# Patient Record
Sex: FEMALE | Race: WHITE | NOT HISPANIC OR LATINO | Employment: OTHER | URBAN - METROPOLITAN AREA
[De-identification: names, ages, dates, MRNs, and addresses within clinical notes are randomized per-mention and may not be internally consistent; named-entity substitution may affect disease eponyms.]

---

## 2017-06-13 ENCOUNTER — HOSPITAL ENCOUNTER (OUTPATIENT)
Dept: URGENT CARE | Facility: CLINIC | Age: 52
Discharge: HOME | End: 2017-06-13
Attending: PREVENTIVE MEDICINE | Admitting: PREVENTIVE MEDICINE

## 2017-06-13 ENCOUNTER — HOSPITAL ENCOUNTER (OUTPATIENT)
Dept: FAMILY MEDICINE CLINIC | Facility: CLINIC | Age: 52
Setting detail: SPECIMEN
Discharge: HOME | End: 2017-06-13
Attending: PREVENTIVE MEDICINE | Admitting: PREVENTIVE MEDICINE

## 2017-06-13 LAB
CASTS URNS QL MICRO: NORMAL /[LPF]
CONV BACTERIA IN URINE MICRO: NEGATIVE
CONV HYALINE CASTS IN URINE MICRO: 1 /[LPF] (ref 0–5)
CONV SMALL ROUND CELLS: NORMAL /[HPF]
RBC #/AREA URNS HPF: 1 /[HPF] (ref 0–3)
SPERM URNS QL MICRO: NORMAL /[HPF]
SQUAMOUS SPT QL MICRO: NORMAL /[HPF] (ref 0–5)
UNIDENT CRYS URNS QL MICRO: NORMAL /[HPF]
WBC #/AREA URNS HPF: 1 /[HPF] (ref 0–5)
YEAST SPEC QL WET PREP: NORMAL /[HPF]

## 2017-06-21 ENCOUNTER — HOSPITAL ENCOUNTER (OUTPATIENT)
Dept: CARDIOLOGY | Facility: HOSPITAL | Age: 52
Discharge: HOME | End: 2017-06-21
Attending: PREVENTIVE MEDICINE | Admitting: PREVENTIVE MEDICINE

## 2018-05-15 ENCOUNTER — HOSPITAL ENCOUNTER (OUTPATIENT)
Dept: FAMILY MEDICINE CLINIC | Facility: CLINIC | Age: 53
Setting detail: SPECIMEN
Discharge: HOME | End: 2018-05-15
Attending: PREVENTIVE MEDICINE | Admitting: PREVENTIVE MEDICINE

## 2018-05-15 LAB
ALBUMIN SERPL-MCNC: 4.4 G/DL (ref 3.5–4.8)
ALBUMIN/GLOB SERPL: 1.6 {RATIO} (ref 1–1.7)
ALP SERPL-CCNC: 77 IU/L (ref 32–91)
ALT SERPL-CCNC: 18 IU/L (ref 14–54)
ANION GAP SERPL CALC-SCNC: 13.2 MMOL/L (ref 10–20)
AST SERPL-CCNC: 21 IU/L (ref 15–41)
BASOPHILS # BLD AUTO: 0.1 10*3/UL (ref 0–0.2)
BASOPHILS NFR BLD AUTO: 1 % (ref 0–2)
BILIRUB SERPL-MCNC: 0.8 MG/DL (ref 0.3–1.2)
BUN SERPL-MCNC: 10 MG/DL (ref 8–20)
BUN/CREAT SERPL: 9.1 (ref 5.4–26.2)
CALCIUM SERPL-MCNC: 9.6 MG/DL (ref 8.9–10.3)
CHLORIDE SERPL-SCNC: 93 MMOL/L (ref 101–111)
CHOLEST SERPL-MCNC: 197 MG/DL
CHOLEST/HDLC SERPL: 2.5 {RATIO}
CONV CO2: 26 MMOL/L (ref 22–32)
CONV LDL CHOLESTEROL DIRECT: 108 MG/DL (ref 0–100)
CONV TOTAL PROTEIN: 7.1 G/DL (ref 6.1–7.9)
CREAT UR-MCNC: 1.1 MG/DL (ref 0.4–1)
DIFFERENTIAL METHOD BLD: (no result)
EOSINOPHIL # BLD AUTO: 0.2 10*3/UL (ref 0–0.3)
EOSINOPHIL # BLD AUTO: 2 % (ref 0–3)
ERYTHROCYTE [DISTWIDTH] IN BLOOD BY AUTOMATED COUNT: 12.8 % (ref 11.5–14.5)
GLOBULIN UR ELPH-MCNC: 2.7 G/DL (ref 2.5–3.8)
GLUCOSE SERPL-MCNC: 86 MG/DL (ref 65–99)
HCT VFR BLD AUTO: 32.4 % (ref 35–49)
HDLC SERPL-MCNC: 80 MG/DL
HGB BLD-MCNC: 10.9 G/DL (ref 12–15)
LDLC/HDLC SERPL: 1.3 {RATIO}
LIPID INTERPRETATION: ABNORMAL
LYMPHOCYTES # BLD AUTO: 2.7 10*3/UL (ref 0.8–4.8)
LYMPHOCYTES NFR BLD AUTO: 35 % (ref 18–42)
MCH RBC QN AUTO: 29.9 PG (ref 26–32)
MCHC RBC AUTO-ENTMCNC: 33.7 G/DL (ref 32–36)
MCV RBC AUTO: 88.6 FL (ref 80–94)
MONOCYTES # BLD AUTO: 0.5 10*3/UL (ref 0.1–1.3)
MONOCYTES NFR BLD AUTO: 7 % (ref 2–11)
NEUTROPHILS # BLD AUTO: 4.2 10*3/UL (ref 2.3–8.6)
NEUTROPHILS NFR BLD AUTO: 55 % (ref 50–75)
NRBC BLD AUTO-RTO: 0 /100{WBCS}
NRBC/RBC NFR BLD MANUAL: 0 10*3/UL
PLATELET # BLD AUTO: 261 10*3/UL (ref 150–450)
PMV BLD AUTO: 9.7 FL (ref 7.4–10.4)
POTASSIUM SERPL-SCNC: 4.2 MMOL/L (ref 3.6–5.1)
RBC # BLD AUTO: 3.66 10*6/UL (ref 4–5.4)
SODIUM SERPL-SCNC: 128 MMOL/L (ref 136–144)
TRIGL SERPL-MCNC: 57 MG/DL
VLDLC SERPL CALC-MCNC: 9 MG/DL
WBC # BLD AUTO: 7.6 10*3/UL (ref 4.5–11.5)

## 2018-07-10 ENCOUNTER — HOSPITAL ENCOUNTER (OUTPATIENT)
Dept: MRI IMAGING | Facility: HOSPITAL | Age: 53
Discharge: HOME | End: 2018-07-10
Attending: PAIN MEDICINE | Admitting: PAIN MEDICINE

## 2018-08-16 ENCOUNTER — HOSPITAL ENCOUNTER (OUTPATIENT)
Dept: FAMILY MEDICINE CLINIC | Facility: CLINIC | Age: 53
Setting detail: SPECIMEN
Discharge: HOME | End: 2018-08-16
Attending: PREVENTIVE MEDICINE | Admitting: PREVENTIVE MEDICINE

## 2018-08-16 LAB — CREAT UR-MCNC: 1.1 MG/DL (ref 0.4–1)

## 2018-08-21 ENCOUNTER — HOSPITAL ENCOUNTER (OUTPATIENT)
Dept: FAMILY MEDICINE CLINIC | Facility: CLINIC | Age: 53
Setting detail: SPECIMEN
Discharge: HOME | End: 2018-08-21
Attending: PREVENTIVE MEDICINE | Admitting: PREVENTIVE MEDICINE

## 2018-08-21 LAB
ALBUMIN SERPL-MCNC: 4.5 G/DL (ref 3.5–4.8)
ALBUMIN/GLOB SERPL: 1.5 {RATIO} (ref 1–1.7)
ALP SERPL-CCNC: 71 IU/L (ref 32–91)
ALT SERPL-CCNC: 19 IU/L (ref 14–54)
ANION GAP SERPL CALC-SCNC: 9.6 MMOL/L (ref 10–20)
AST SERPL-CCNC: 21 IU/L (ref 15–41)
BASOPHILS # BLD AUTO: 0.1 10*3/UL (ref 0–0.2)
BASOPHILS NFR BLD AUTO: 1 % (ref 0–2)
BILIRUB SERPL-MCNC: 0.7 MG/DL (ref 0.3–1.2)
BUN SERPL-MCNC: 14 MG/DL (ref 8–20)
BUN/CREAT SERPL: 14 (ref 5.4–26.2)
CALCIUM SERPL-MCNC: 9.6 MG/DL (ref 8.9–10.3)
CHLORIDE SERPL-SCNC: 95 MMOL/L (ref 101–111)
CHOLEST SERPL-MCNC: 230 MG/DL
CHOLEST/HDLC SERPL: 2.8 {RATIO}
CONV CO2: 27 MMOL/L (ref 22–32)
CONV LDL CHOLESTEROL DIRECT: 128 MG/DL (ref 0–100)
CONV TOTAL PROTEIN: 7.6 G/DL (ref 6.1–7.9)
CREAT UR-MCNC: 1 MG/DL (ref 0.4–1)
DIFFERENTIAL METHOD BLD: (no result)
EOSINOPHIL # BLD AUTO: 0.3 10*3/UL (ref 0–0.3)
EOSINOPHIL # BLD AUTO: 4 % (ref 0–3)
ERYTHROCYTE [DISTWIDTH] IN BLOOD BY AUTOMATED COUNT: 13.5 % (ref 11.5–14.5)
GLOBULIN UR ELPH-MCNC: 3.1 G/DL (ref 2.5–3.8)
GLUCOSE SERPL-MCNC: 91 MG/DL (ref 65–99)
HCT VFR BLD AUTO: 34.7 % (ref 35–49)
HDLC SERPL-MCNC: 82 MG/DL
HGB BLD-MCNC: 11.8 G/DL (ref 12–15)
LDLC/HDLC SERPL: 1.6 {RATIO}
LIPID INTERPRETATION: ABNORMAL
LYMPHOCYTES # BLD AUTO: 2.7 10*3/UL (ref 0.8–4.8)
LYMPHOCYTES NFR BLD AUTO: 36 % (ref 18–42)
MCH RBC QN AUTO: 30.9 PG (ref 26–32)
MCHC RBC AUTO-ENTMCNC: 33.9 G/DL (ref 32–36)
MCV RBC AUTO: 91.1 FL (ref 80–94)
MONOCYTES # BLD AUTO: 0.5 10*3/UL (ref 0.1–1.3)
MONOCYTES NFR BLD AUTO: 7 % (ref 2–11)
NEUTROPHILS # BLD AUTO: 4 10*3/UL (ref 2.3–8.6)
NEUTROPHILS NFR BLD AUTO: 52 % (ref 50–75)
NRBC BLD AUTO-RTO: 0 /100{WBCS}
NRBC/RBC NFR BLD MANUAL: 0 10*3/UL
PLATELET # BLD AUTO: 243 10*3/UL (ref 150–450)
PMV BLD AUTO: 10.8 FL (ref 7.4–10.4)
POTASSIUM SERPL-SCNC: 3.6 MMOL/L (ref 3.6–5.1)
RBC # BLD AUTO: 3.81 10*6/UL (ref 4–5.4)
SODIUM SERPL-SCNC: 128 MMOL/L (ref 136–144)
TRIGL SERPL-MCNC: 93 MG/DL
VLDLC SERPL CALC-MCNC: 20.6 MG/DL
WBC # BLD AUTO: 7.6 10*3/UL (ref 4.5–11.5)

## 2018-08-22 LAB
CONV HIV-1/ HIV-2: NORMAL
CONV HIV-1/ HIV-2: NORMAL

## 2018-08-23 LAB
HCV AB SER DONR QL: NORMAL
HCV AB SER DONR QL: NORMAL

## 2018-09-12 ENCOUNTER — HOSPITAL ENCOUNTER (OUTPATIENT)
Dept: FAMILY MEDICINE CLINIC | Facility: CLINIC | Age: 53
Setting detail: SPECIMEN
Discharge: HOME | End: 2018-09-12
Attending: PREVENTIVE MEDICINE | Admitting: PREVENTIVE MEDICINE

## 2018-09-12 LAB
ALBUMIN SERPL-MCNC: 4.2 G/DL (ref 3.5–4.8)
ALBUMIN/GLOB SERPL: 1.6 {RATIO} (ref 1–1.7)
ALP SERPL-CCNC: 76 IU/L (ref 32–91)
ALT SERPL-CCNC: 19 IU/L (ref 14–54)
ANION GAP SERPL CALC-SCNC: 11.9 MMOL/L (ref 10–20)
AST SERPL-CCNC: 19 IU/L (ref 15–41)
BILIRUB SERPL-MCNC: 0.7 MG/DL (ref 0.3–1.2)
BUN SERPL-MCNC: 13 MG/DL (ref 8–20)
BUN/CREAT SERPL: 11.8 (ref 5.4–26.2)
CALCIUM SERPL-MCNC: 9.3 MG/DL (ref 8.9–10.3)
CHLORIDE SERPL-SCNC: 100 MMOL/L (ref 101–111)
CONV CO2: 25 MMOL/L (ref 22–32)
CONV TOTAL PROTEIN: 6.9 G/DL (ref 6.1–7.9)
CREAT UR-MCNC: 1.1 MG/DL (ref 0.4–1)
GLOBULIN UR ELPH-MCNC: 2.7 G/DL (ref 2.5–3.8)
GLUCOSE SERPL-MCNC: 109 MG/DL (ref 65–99)
POTASSIUM SERPL-SCNC: 3.9 MMOL/L (ref 3.6–5.1)
SODIUM SERPL-SCNC: 133 MMOL/L (ref 136–144)

## 2019-02-13 ENCOUNTER — HOSPITAL ENCOUNTER (OUTPATIENT)
Dept: FAMILY MEDICINE CLINIC | Facility: CLINIC | Age: 54
Discharge: HOME | End: 2019-02-13
Attending: PREVENTIVE MEDICINE | Admitting: PREVENTIVE MEDICINE

## 2019-02-19 ENCOUNTER — CONVERSION ENCOUNTER (OUTPATIENT)
Dept: FAMILY MEDICINE CLINIC | Facility: CLINIC | Age: 54
End: 2019-02-19

## 2019-02-19 ENCOUNTER — HOSPITAL ENCOUNTER (OUTPATIENT)
Dept: FAMILY MEDICINE CLINIC | Facility: CLINIC | Age: 54
Setting detail: SPECIMEN
Discharge: HOME | End: 2019-02-19
Attending: PREVENTIVE MEDICINE | Admitting: PREVENTIVE MEDICINE

## 2019-02-19 LAB
ALBUMIN SERPL-MCNC: 4.4 G/DL (ref 3.5–4.8)
ALBUMIN/GLOB SERPL: 1.5 {RATIO} (ref 1–1.7)
ALP SERPL-CCNC: 65 IU/L (ref 32–91)
ALT SERPL-CCNC: 18 IU/L (ref 14–54)
ANION GAP SERPL CALC-SCNC: 13.8 MMOL/L (ref 10–20)
AST SERPL-CCNC: 19 IU/L (ref 15–41)
BASOPHILS # BLD AUTO: 0.1 10*3/UL (ref 0–0.2)
BASOPHILS NFR BLD AUTO: 1 % (ref 0–2)
BILIRUB SERPL-MCNC: 0.8 MG/DL (ref 0.3–1.2)
BUN SERPL-MCNC: 10 MG/DL (ref 8–20)
BUN/CREAT SERPL: 12.5 (ref 5.4–26.2)
CALCIUM SERPL-MCNC: 9.6 MG/DL (ref 8.9–10.3)
CHLORIDE SERPL-SCNC: 100 MMOL/L (ref 101–111)
CHOLEST SERPL-MCNC: 207 MG/DL
CHOLEST/HDLC SERPL: 2.9 {RATIO}
CONV CO2: 24 MMOL/L (ref 22–32)
CONV LDL CHOLESTEROL DIRECT: 130 MG/DL (ref 0–100)
CONV TOTAL PROTEIN: 7.4 G/DL (ref 6.1–7.9)
CREAT UR-MCNC: 0.8 MG/DL (ref 0.4–1)
DIFFERENTIAL METHOD BLD: (no result)
EOSINOPHIL # BLD AUTO: 0.2 10*3/UL (ref 0–0.3)
EOSINOPHIL # BLD AUTO: 3 % (ref 0–3)
ERYTHROCYTE [DISTWIDTH] IN BLOOD BY AUTOMATED COUNT: 15 % (ref 11.5–14.5)
ERYTHROCYTE [SEDIMENTATION RATE] IN BLOOD BY WESTERGREN METHOD: 9 MM/HR (ref 0–30)
GLOBULIN UR ELPH-MCNC: 3 G/DL (ref 2.5–3.8)
GLUCOSE SERPL-MCNC: 95 MG/DL (ref 65–99)
HCT VFR BLD AUTO: 39.6 % (ref 35–49)
HDLC SERPL-MCNC: 71 MG/DL
HGB BLD-MCNC: 13.2 G/DL (ref 12–15)
LDLC/HDLC SERPL: 1.8 {RATIO}
LIPID INTERPRETATION: ABNORMAL
LYMPHOCYTES # BLD AUTO: 1.9 10*3/UL (ref 0.8–4.8)
LYMPHOCYTES NFR BLD AUTO: 30 % (ref 18–42)
MAGNESIUM SERPL-MCNC: 2 MG/DL (ref 1.8–2.5)
MCH RBC QN AUTO: 30.6 PG (ref 26–32)
MCHC RBC AUTO-ENTMCNC: 33.4 G/DL (ref 32–36)
MCV RBC AUTO: 91.7 FL (ref 80–94)
MONOCYTES # BLD AUTO: 0.5 10*3/UL (ref 0.1–1.3)
MONOCYTES NFR BLD AUTO: 8 % (ref 2–11)
NEUTROPHILS # BLD AUTO: 3.7 10*3/UL (ref 2.3–8.6)
NEUTROPHILS NFR BLD AUTO: 58 % (ref 50–75)
NRBC BLD AUTO-RTO: 0 /100{WBCS}
NRBC/RBC NFR BLD MANUAL: 0 10*3/UL
PLATELET # BLD AUTO: 222 10*3/UL (ref 150–450)
PMV BLD AUTO: 11.2 FL (ref 7.4–10.4)
POTASSIUM SERPL-SCNC: 3.8 MMOL/L (ref 3.6–5.1)
RBC # BLD AUTO: 4.32 10*6/UL (ref 4–5.4)
SODIUM SERPL-SCNC: 134 MMOL/L (ref 136–144)
TRIGL SERPL-MCNC: 83 MG/DL
VLDLC SERPL CALC-MCNC: 6.8 MG/DL
WBC # BLD AUTO: 6.4 10*3/UL (ref 4.5–11.5)

## 2019-03-01 ENCOUNTER — HOSPITAL ENCOUNTER (OUTPATIENT)
Dept: CT IMAGING | Facility: HOSPITAL | Age: 54
Discharge: HOME | End: 2019-03-01
Attending: PREVENTIVE MEDICINE | Admitting: PREVENTIVE MEDICINE

## 2019-03-01 LAB — CREAT BLDA-MCNC: 0.7 MG/DL (ref 0.6–1.3)

## 2019-03-02 ENCOUNTER — HOSPITAL ENCOUNTER (OUTPATIENT)
Dept: LAB | Facility: HOSPITAL | Age: 54
Discharge: HOME | End: 2019-03-02
Attending: PREVENTIVE MEDICINE | Admitting: PREVENTIVE MEDICINE

## 2019-03-02 LAB
BACTERIA SPEC AEROBE CULT: NORMAL
Lab: NORMAL
MICRO REPORT STATUS: NORMAL
SPECIMEN SOURCE: NORMAL

## 2019-03-03 LAB
C DIFF TOX GENS STL QL NAA+PROBE: NEGATIVE
CDIFF INTERPRETATION: NORMAL
CONV CDIFF GDH AG: NEGATIVE
CRYPTOSP AG STL QL IA: NEGATIVE

## 2019-03-04 ENCOUNTER — HOSPITAL ENCOUNTER (OUTPATIENT)
Dept: LAB | Facility: HOSPITAL | Age: 54
Discharge: HOME | End: 2019-03-04
Attending: PREVENTIVE MEDICINE | Admitting: PREVENTIVE MEDICINE

## 2019-03-04 LAB
BACTERIA SPEC AEROBE CULT: NORMAL
C DIFF TOX GENS STL QL NAA+PROBE: NEGATIVE
CDIFF INTERPRETATION: NORMAL
CONV CDIFF GDH AG: NEGATIVE
CRYPTOSP AG STL QL IA: NEGATIVE
Lab: NORMAL
MICRO REPORT STATUS: NORMAL
SPECIMEN SOURCE: NORMAL

## 2019-03-15 ENCOUNTER — HOSPITAL ENCOUNTER (OUTPATIENT)
Dept: FAMILY MEDICINE CLINIC | Facility: CLINIC | Age: 54
Setting detail: SPECIMEN
Discharge: HOME | End: 2019-03-15
Attending: PREVENTIVE MEDICINE | Admitting: PREVENTIVE MEDICINE

## 2019-05-30 ENCOUNTER — OFFICE (AMBULATORY)
Dept: URBAN - METROPOLITAN AREA CLINIC 64 | Facility: CLINIC | Age: 54
End: 2019-05-30

## 2019-05-30 VITALS
WEIGHT: 152 LBS | HEIGHT: 64 IN | DIASTOLIC BLOOD PRESSURE: 108 MMHG | SYSTOLIC BLOOD PRESSURE: 156 MMHG | HEART RATE: 62 BPM

## 2019-05-30 DIAGNOSIS — N25.9 DISORDER RESULTING FROM IMPAIRED RENAL TUBULAR FUNCTION, UNS: ICD-10-CM

## 2019-05-30 DIAGNOSIS — R10.30 LOWER ABDOMINAL PAIN, UNSPECIFIED: ICD-10-CM

## 2019-05-30 DIAGNOSIS — R19.7 DIARRHEA, UNSPECIFIED: ICD-10-CM

## 2019-05-30 PROCEDURE — 99243 OFF/OP CNSLTJ NEW/EST LOW 30: CPT | Performed by: NURSE PRACTITIONER

## 2019-05-30 RX ORDER — DICYCLOMINE HYDROCHLORIDE 10 MG/1
20 CAPSULE ORAL
Qty: 60 | Refills: 6 | Status: COMPLETED
Start: 2019-05-30 | End: 2020-08-12

## 2019-05-30 RX ORDER — BUDESONIDE 3 MG/1
CAPSULE ORAL
Qty: 90 | Refills: 3 | Status: COMPLETED
Start: 2019-05-30 | End: 2020-08-12

## 2019-06-03 VITALS
BODY MASS INDEX: 26.15 KG/M2 | HEART RATE: 51 BPM | HEIGHT: 64 IN | WEIGHT: 153.2 LBS | SYSTOLIC BLOOD PRESSURE: 169 MMHG | DIASTOLIC BLOOD PRESSURE: 97 MMHG

## 2019-07-26 ENCOUNTER — OFFICE VISIT (OUTPATIENT)
Dept: FAMILY MEDICINE CLINIC | Facility: CLINIC | Age: 54
End: 2019-07-26

## 2019-07-26 VITALS
DIASTOLIC BLOOD PRESSURE: 78 MMHG | WEIGHT: 148.4 LBS | SYSTOLIC BLOOD PRESSURE: 168 MMHG | TEMPERATURE: 98.1 F | HEIGHT: 64 IN | BODY MASS INDEX: 25.33 KG/M2 | HEART RATE: 56 BPM | OXYGEN SATURATION: 94 %

## 2019-07-26 DIAGNOSIS — I10 ESSENTIAL HYPERTENSION: ICD-10-CM

## 2019-07-26 DIAGNOSIS — J01.00 ACUTE MAXILLARY SINUSITIS, RECURRENCE NOT SPECIFIED: Primary | ICD-10-CM

## 2019-07-26 PROBLEM — E78.5 HYPERLIPIDEMIA: Status: ACTIVE | Noted: 2018-08-16

## 2019-07-26 PROBLEM — E03.9 HYPOTHYROIDISM: Status: ACTIVE | Noted: 2019-02-20

## 2019-07-26 PROCEDURE — 99214 OFFICE O/P EST MOD 30 MIN: CPT | Performed by: PREVENTIVE MEDICINE

## 2019-07-26 RX ORDER — TIZANIDINE 4 MG/1
4 TABLET ORAL
Refills: 5 | COMMUNITY
Start: 2019-07-20

## 2019-07-26 RX ORDER — HYDRALAZINE HYDROCHLORIDE 25 MG/1
50 TABLET, FILM COATED ORAL 3 TIMES DAILY
Refills: 3 | COMMUNITY
Start: 2019-07-05 | End: 2019-08-01 | Stop reason: SDUPTHER

## 2019-07-26 RX ORDER — CEFDINIR 300 MG/1
300 CAPSULE ORAL 2 TIMES DAILY
Qty: 20 CAPSULE | Refills: 0 | Status: SHIPPED | OUTPATIENT
Start: 2019-07-26 | End: 2019-08-05

## 2019-07-26 RX ORDER — ATENOLOL 50 MG/1
50 TABLET ORAL DAILY
Refills: 3 | COMMUNITY
Start: 2019-06-22 | End: 2019-11-27 | Stop reason: SDUPTHER

## 2019-07-26 RX ORDER — ATORVASTATIN CALCIUM 20 MG/1
TABLET, FILM COATED ORAL
Qty: 90 TABLET | Refills: 3 | Status: SHIPPED | OUTPATIENT
Start: 2019-07-26 | End: 2020-07-27

## 2019-07-26 RX ORDER — VALACYCLOVIR HYDROCHLORIDE 500 MG/1
500 TABLET, FILM COATED ORAL DAILY
Refills: 4 | COMMUNITY
Start: 2019-07-23 | End: 2019-09-03

## 2019-07-26 RX ORDER — GANCICLOVIR 1.5 MG/G
GEL OPHTHALMIC
Refills: 6 | COMMUNITY
Start: 2019-07-23 | End: 2019-09-03

## 2019-07-26 RX ORDER — ATORVASTATIN CALCIUM 20 MG/1
20 TABLET, FILM COATED ORAL
Refills: 1 | COMMUNITY
Start: 2019-06-22 | End: 2019-07-26 | Stop reason: SDUPTHER

## 2019-07-26 RX ORDER — HYDROCODONE BITARTRATE AND ACETAMINOPHEN 10; 325 MG/1; MG/1
1 TABLET ORAL EVERY 6 HOURS
Refills: 0 | COMMUNITY
Start: 2019-07-20

## 2019-07-26 RX ORDER — MULTIVIT-MIN/IRON/FOLIC ACID/K 18-600-40
CAPSULE ORAL
COMMUNITY
Start: 2018-08-16 | End: 2022-03-22

## 2019-07-26 RX ORDER — LEVOTHYROXINE SODIUM 0.03 MG/1
TABLET ORAL
Refills: 0 | COMMUNITY
Start: 2019-07-05 | End: 2019-10-24 | Stop reason: SDUPTHER

## 2019-07-26 NOTE — PROGRESS NOTES
Andre Carlos is a 54 y.o. female presents for No chief complaint on file.  Head ache and fatique and clear sinus drainage and head pressure.  Taking meds and no cough or vomiting.  One episode of diarrhea yesterday.  Hasn't taken cold med or missed BP meds.    Health Maintenance Due   Topic Date Due   • ANNUAL PHYSICAL  01/07/1966   • TDAP/TD VACCINES (1 - Tdap) 01/07/1982   • ZOSTER VACCINE (1 of 2) 01/07/2013   • PAP SMEAR  07/26/2019   • COLONOSCOPY  07/26/2019       History of Present Illness     There were no vitals filed for this visit.    No current outpatient medications on file prior to visit.     No current facility-administered medications on file prior to visit.        The following portions of the patient's history were reviewed and updated as appropriate: allergies, current medications, past family history, past medical history, past social history, past surgical history and problem list.    Review of Systems   Constitutional: Negative.    HENT: Positive for congestion, ear pain, sinus pressure and sore throat.    Eyes: Negative.    Respiratory: Negative.  Negative for cough.    Cardiovascular: Negative.    Gastrointestinal: Positive for diarrhea.   Endocrine: Negative.    Genitourinary: Negative.    Musculoskeletal: Negative.    Skin: Negative.    Allergic/Immunologic: Positive for environmental allergies.   Neurological: Negative.    Hematological: Negative.    Psychiatric/Behavioral: Negative.        Objective   Physical Exam   Constitutional: She is oriented to person, place, and time. She appears well-developed.   HENT:   Head: Normocephalic and atraumatic.   Redness both nostrils,  Erythema tonsils and enlargement.   Eyes: Conjunctivae and EOM are normal. Pupils are equal, round, and reactive to light.   Neck: Normal range of motion.   Cardiovascular: Normal rate and regular rhythm.   Pulmonary/Chest: Effort normal.   Decreased breath sound bryon     Abdominal: Soft. Bowel sounds  are normal.   Musculoskeletal: Normal range of motion.   Lymphadenopathy:     She has no cervical adenopathy.   Neurological: She is alert and oriented to person, place, and time.   Skin: Skin is warm and dry.   Psychiatric: She has a normal mood and affect.   Nursing note and vitals reviewed.    PHQ-9 Total Score:      Assessment/Plan   There are no diagnoses linked to this encounter.    There are no Patient Instructions on file for this visit.

## 2019-07-26 NOTE — PATIENT INSTRUCTIONS
Rest and fluids and call Monday to report progress if worsens go to ER.  Off work today.  After first of week collect 10 blood pressures and pulses and call to us.  Continue Atenolol nad increase Hydralazine to 50 mg three times daily

## 2019-08-01 RX ORDER — HYDRALAZINE HYDROCHLORIDE 25 MG/1
TABLET, FILM COATED ORAL
Qty: 90 TABLET | Refills: 3 | Status: SHIPPED | OUTPATIENT
Start: 2019-08-01 | End: 2019-09-03

## 2019-08-05 ENCOUNTER — OFFICE VISIT (OUTPATIENT)
Dept: FAMILY MEDICINE CLINIC | Facility: CLINIC | Age: 54
End: 2019-08-05

## 2019-08-05 VITALS
TEMPERATURE: 97.9 F | RESPIRATION RATE: 16 BRPM | SYSTOLIC BLOOD PRESSURE: 160 MMHG | WEIGHT: 147.8 LBS | HEART RATE: 50 BPM | OXYGEN SATURATION: 97 % | BODY MASS INDEX: 25.37 KG/M2 | DIASTOLIC BLOOD PRESSURE: 80 MMHG

## 2019-08-05 DIAGNOSIS — J32.1 SINUSITIS CHRONIC, FRONTAL: Primary | ICD-10-CM

## 2019-08-05 PROBLEM — M54.50 LOW BACK PAIN: Status: ACTIVE | Noted: 2017-06-13

## 2019-08-05 PROBLEM — M19.90 ARTHRITIS: Status: ACTIVE | Noted: 2019-08-05

## 2019-08-05 PROBLEM — D64.9 ANEMIA: Status: ACTIVE | Noted: 2018-08-23

## 2019-08-05 PROBLEM — R31.9 HEMATURIA: Status: ACTIVE | Noted: 2017-06-13

## 2019-08-05 PROBLEM — R94.4 NONSPECIFIC ABNORMAL RESULTS OF FUNCTION STUDY OF KIDNEY: Status: ACTIVE | Noted: 2018-05-17

## 2019-08-05 PROBLEM — K52.832 LYMPHOCYTIC COLITIS: Status: ACTIVE | Noted: 2018-08-23

## 2019-08-05 PROBLEM — E66.3 OVERWEIGHT: Status: ACTIVE | Noted: 2018-02-23

## 2019-08-05 PROBLEM — Z87.891 FORMER SMOKER: Status: ACTIVE | Noted: 2018-05-10

## 2019-08-05 PROBLEM — Z80.1 FAMILY HISTORY OF MALIGNANT NEOPLASM OF TRACHEA, BRONCHUS AND LUNG: Status: ACTIVE | Noted: 2019-08-05

## 2019-08-05 PROBLEM — E55.9 VITAMIN D DEFICIENCY: Status: ACTIVE | Noted: 2018-05-10

## 2019-08-05 PROCEDURE — 99213 OFFICE O/P EST LOW 20 MIN: CPT | Performed by: PREVENTIVE MEDICINE

## 2019-08-05 NOTE — PATIENT INSTRUCTIONS
Use Neddipot and Flonase and followup ENT thisweek. Call Friday with two blood pressures from Thursday and two from Friday

## 2019-08-05 NOTE — PROGRESS NOTES
Subjective   Melodie Carlos is a 54 y.o. female presents for   Chief Complaint   Patient presents with   • Headache     still not feeling well   Never got over sinus congestion and knot in throat.  Awoke 2am with dry throat today.  Not using flonase or nedipot.  Did have diarrhea yesterday.  No cough dysuria or fever    Health Maintenance Due   Topic Date Due   • ANNUAL PHYSICAL  01/07/1966   • TDAP/TD VACCINES (1 - Tdap) 01/07/1982   • ZOSTER VACCINE (1 of 2) 01/07/2013   • PAP SMEAR  07/26/2019   • COLONOSCOPY  07/26/2019   • INFLUENZA VACCINE  08/01/2019       History of Present Illness     Vitals:    08/05/19 1312 08/05/19 1314 08/05/19 1336   BP: (!) 200/108 (!) 192/115 160/80   BP Location: Right arm Left arm    Patient Position: Sitting Sitting    Cuff Size: Adult Adult    Pulse: 50     Resp: 16     Temp: 97.9 °F (36.6 °C)     TempSrc: Oral     SpO2: 97%     Weight: 67 kg (147 lb 12.8 oz)         Current Outpatient Medications on File Prior to Visit   Medication Sig Dispense Refill   • atenolol (TENORMIN) 50 MG tablet Take 50 mg by mouth Daily.  3   • atorvastatin (LIPITOR) 20 MG tablet TAKE ONE TABLET BY MOUTH AT BEDTIME 90 tablet 3   • Cholecalciferol (VITAMIN D) 2000 units capsule VITAMIN D 2000 UNIT CAPS     • hydrALAZINE (APRESOLINE) 25 MG tablet Take 1 tablet by mouth three times daily 90 tablet 3   • HYDROcodone-acetaminophen (NORCO)  MG per tablet Take 1 tablet by mouth Every 6 (Six) Hours.  0   • levothyroxine (SYNTHROID, LEVOTHROID) 25 MCG tablet Take 1 tablet by mouth daily on empty stomach for one hour  0   • tiZANidine (ZANAFLEX) 4 MG tablet Take 4 mg by mouth every night at bedtime.  5   • valACYclovir (VALTREX) 500 MG tablet Take 500 mg by mouth Daily. FOR 30 DAYS  4   • ZIRGAN 0.15 % gel ophthalmic gel INSTILL 1 DROP INTO AFFECTED EYE THREE TIMES DAILY FOR 7 DAYS  6   • [DISCONTINUED] cefdinir (OMNICEF) 300 MG capsule Take 1 capsule by mouth 2 (Two) Times a Day. 20 capsule 0     No  current facility-administered medications on file prior to visit.        The following portions of the patient's history were reviewed and updated as appropriate: allergies, current medications, past family history, past medical history, past social history, past surgical history and problem list.    Review of Systems   Constitutional: Negative.    HENT: Positive for congestion, sinus pressure and sore throat.    Eyes: Negative.    Respiratory: Negative.  Negative for cough.    Cardiovascular: Negative.    Gastrointestinal: Negative.    Endocrine: Negative.    Genitourinary: Negative.    Musculoskeletal: Negative.    Skin: Negative.    Allergic/Immunologic: Positive for environmental allergies.   Neurological: Positive for headache.   Hematological: Negative.    Psychiatric/Behavioral: Negative.        Objective   Physical Exam   Constitutional: She is oriented to person, place, and time. She appears well-developed.   HENT:   Head: Normocephalic and atraumatic.   Eyes: Conjunctivae and EOM are normal. Pupils are equal, round, and reactive to light.   Neck: Normal range of motion.   Cardiovascular: Normal rate and regular rhythm.   Pulmonary/Chest: Effort normal and breath sounds normal.   Abdominal: Soft. Bowel sounds are normal.   Musculoskeletal: Normal range of motion.   Lymphadenopathy:     She has no cervical adenopathy.   Neurological: She is alert and oriented to person, place, and time.   Skin: Skin is warm and dry.   Psychiatric: She has a normal mood and affect.   Nursing note and vitals reviewed.    PHQ-9 Total Score:      Assessment/Plan   Melodie was seen today for headache.    Diagnoses and all orders for this visit:    Sinusitis chronic, frontal        Patient Instructions   Use Neddipot and Flonase and followup ENT thisweek. Call Friday with two blood pressures from Thursday and two from Friday

## 2019-09-03 ENCOUNTER — OFFICE VISIT (OUTPATIENT)
Dept: FAMILY MEDICINE CLINIC | Facility: CLINIC | Age: 54
End: 2019-09-03

## 2019-09-03 VITALS
SYSTOLIC BLOOD PRESSURE: 137 MMHG | DIASTOLIC BLOOD PRESSURE: 85 MMHG | OXYGEN SATURATION: 96 % | HEART RATE: 55 BPM | BODY MASS INDEX: 25.4 KG/M2 | RESPIRATION RATE: 16 BRPM | WEIGHT: 148 LBS | TEMPERATURE: 98.6 F

## 2019-09-03 DIAGNOSIS — M54.6 ACUTE LEFT-SIDED THORACIC BACK PAIN: Primary | ICD-10-CM

## 2019-09-03 PROCEDURE — 99214 OFFICE O/P EST MOD 30 MIN: CPT | Performed by: PREVENTIVE MEDICINE

## 2019-09-03 RX ORDER — HYDRALAZINE HYDROCHLORIDE 25 MG/1
TABLET, FILM COATED ORAL
Qty: 90 TABLET | Refills: 3 | Status: SHIPPED | OUTPATIENT
Start: 2019-09-03 | End: 2019-09-17

## 2019-09-03 NOTE — PROGRESS NOTES
Subjective   Melodie Carlos is a 54 y.o. female presents for   Chief Complaint   Patient presents with   • Sinusitis     4 week fu    Sinus infection gone.  Needs mammogram, Pap, andannual physical.  Pain L shoulder pain     Health Maintenance Due   Topic Date Due   • MAMMOGRAM  1963   • ANNUAL PHYSICAL  01/07/1966   • TDAP/TD VACCINES (1 - Tdap) 01/07/1982   • ZOSTER VACCINE (1 of 2) 01/07/2013   • PAP SMEAR  07/26/2019   • COLONOSCOPY  07/26/2019   • INFLUENZA VACCINE  08/01/2019       History of Present Illness     Vitals:    09/03/19 1526 09/03/19 1531   BP: 154/88 137/85   BP Location: Right arm Left arm   Patient Position: Sitting Sitting   Cuff Size: Adult Adult   Pulse: 55    Resp: 16    Temp: 98.6 °F (37 °C)    TempSrc: Oral    SpO2: 96%    Weight: 67.1 kg (148 lb)        Current Outpatient Medications on File Prior to Visit   Medication Sig Dispense Refill   • atenolol (TENORMIN) 50 MG tablet Take 50 mg by mouth Daily.  3   • atorvastatin (LIPITOR) 20 MG tablet TAKE ONE TABLET BY MOUTH AT BEDTIME 90 tablet 3   • Cholecalciferol (VITAMIN D) 2000 units capsule VITAMIN D 2000 UNIT CAPS     • hydrALAZINE (APRESOLINE) 25 MG tablet Take 1 tablet by mouth three times daily (Patient taking differently: Take 2 tablet by mouth three times daily) 90 tablet 3   • HYDROcodone-acetaminophen (NORCO)  MG per tablet Take 1 tablet by mouth Every 6 (Six) Hours.  0   • levothyroxine (SYNTHROID, LEVOTHROID) 25 MCG tablet Take 1 tablet by mouth daily on empty stomach for one hour  0   • tiZANidine (ZANAFLEX) 4 MG tablet Take 4 mg by mouth every night at bedtime.  5   • [DISCONTINUED] valACYclovir (VALTREX) 500 MG tablet Take 500 mg by mouth Daily. FOR 30 DAYS  4   • [DISCONTINUED] ZIRGAN 0.15 % gel ophthalmic gel INSTILL 1 DROP INTO AFFECTED EYE THREE TIMES DAILY FOR 7 DAYS  6     No current facility-administered medications on file prior to visit.        The following portions of the patient's history were  reviewed and updated as appropriate: allergies, current medications, past family history, past medical history, past social history, past surgical history and problem list.    Review of Systems   Constitutional: Negative.    HENT: Negative.  Negative for sinus pressure and sore throat.    Eyes: Negative.    Respiratory: Negative.  Negative for cough.    Cardiovascular: Negative.    Gastrointestinal: Negative.    Endocrine: Negative.    Genitourinary: Negative.    Musculoskeletal: Positive for back pain.   Skin: Negative.    Allergic/Immunologic: Positive for environmental allergies.   Neurological: Negative.    Hematological: Negative.    Psychiatric/Behavioral: Negative.        Objective   Physical Exam   HENT:   Blister R tip tongue   Pulmonary/Chest:   Decreased breath sound bryon       PHQ-9 Total Score:      Assessment/Plan   There are no diagnoses linked to this encounter.    Patient Instructions   Hydralazine 75 mg in am and 50 noon and pm.  Call 10 blood pressures over next two weeks   Watch sodium, alcohol and weight.  Report L back pain at work and will see if they deny claim in writing if private insurance will cover.  Patient to gt mammogram and PAP smear.      Get flu shot at work and gt Tdap,  [employer].  1/4 tsp salt in 4 ounces waster.

## 2019-09-03 NOTE — PATIENT INSTRUCTIONS
Hydralazine 75 mg in am and 50 noon and pm.  Call 10 blood pressures over next two weeks   Watch sodium, alcohol and weight.  Report L back pain at work and will see if they deny claim in writing if private insurance will cover.  Patient to gt mammogram and PAP smear.      Get flu shot at work and gt Tdap,  [employer].  1/4 tsp salt in 4 ounces waster.

## 2019-09-17 ENCOUNTER — OFFICE VISIT (OUTPATIENT)
Dept: FAMILY MEDICINE CLINIC | Facility: CLINIC | Age: 54
End: 2019-09-17

## 2019-09-17 VITALS
SYSTOLIC BLOOD PRESSURE: 151 MMHG | WEIGHT: 145.2 LBS | OXYGEN SATURATION: 98 % | TEMPERATURE: 98.3 F | RESPIRATION RATE: 16 BRPM | HEART RATE: 62 BPM | DIASTOLIC BLOOD PRESSURE: 91 MMHG | BODY MASS INDEX: 24.92 KG/M2

## 2019-09-17 DIAGNOSIS — Z12.2 ENCOUNTER FOR SCREENING FOR LUNG CANCER: ICD-10-CM

## 2019-09-17 DIAGNOSIS — I10 ESSENTIAL HYPERTENSION: ICD-10-CM

## 2019-09-17 DIAGNOSIS — E55.9 VITAMIN D DEFICIENCY: ICD-10-CM

## 2019-09-17 DIAGNOSIS — D64.9 ANEMIA, UNSPECIFIED TYPE: ICD-10-CM

## 2019-09-17 DIAGNOSIS — Z87.891 FORMER SMOKER: ICD-10-CM

## 2019-09-17 DIAGNOSIS — I63.9 CEREBROVASCULAR ACCIDENT (CVA), UNSPECIFIED MECHANISM (HCC): ICD-10-CM

## 2019-09-17 DIAGNOSIS — Z12.39 SCREENING FOR BREAST CANCER: ICD-10-CM

## 2019-09-17 DIAGNOSIS — Z12.4 PAP SMEAR FOR CERVICAL CANCER SCREENING: ICD-10-CM

## 2019-09-17 DIAGNOSIS — Z00.01 ENCOUNTER FOR ANNUAL GENERAL MEDICAL EXAMINATION WITH ABNORMAL FINDINGS IN ADULT: Primary | ICD-10-CM

## 2019-09-17 DIAGNOSIS — E03.9 HYPOTHYROIDISM, UNSPECIFIED TYPE: ICD-10-CM

## 2019-09-17 DIAGNOSIS — E78.5 HYPERLIPIDEMIA, UNSPECIFIED HYPERLIPIDEMIA TYPE: ICD-10-CM

## 2019-09-17 LAB
ALBUMIN SERPL-MCNC: 4.3 G/DL (ref 3.5–4.8)
ALBUMIN/GLOB SERPL: 1.9 G/DL (ref 1–1.7)
ALP SERPL-CCNC: 57 U/L (ref 32–91)
ALT SERPL W P-5'-P-CCNC: 23 U/L (ref 14–54)
ANION GAP SERPL CALCULATED.3IONS-SCNC: 13.3 MMOL/L (ref 5–15)
ARTICHOKE IGE QN: 67 MG/DL (ref 0–100)
AST SERPL-CCNC: 22 U/L (ref 15–41)
BASOPHILS # BLD AUTO: 0.1 10*3/MM3 (ref 0–0.2)
BASOPHILS NFR BLD AUTO: 1.2 % (ref 0–1.5)
BILIRUB SERPL-MCNC: 0.8 MG/DL (ref 0.3–1.2)
BUN BLD-MCNC: 13 MG/DL (ref 8–20)
BUN/CREAT SERPL: 14.4 (ref 5.4–26.2)
CALCIUM SPEC-SCNC: 9.3 MG/DL (ref 8.9–10.3)
CHLORIDE SERPL-SCNC: 98 MMOL/L (ref 101–111)
CHOLEST SERPL-MCNC: 153 MG/DL
CO2 SERPL-SCNC: 26 MMOL/L (ref 22–32)
CREAT BLD-MCNC: 0.9 MG/DL (ref 0.4–1)
DEPRECATED RDW RBC AUTO: 44.6 FL (ref 37–54)
EOSINOPHIL # BLD AUTO: 0.2 10*3/MM3 (ref 0–0.4)
EOSINOPHIL NFR BLD AUTO: 2.6 % (ref 0.3–6.2)
ERYTHROCYTE [DISTWIDTH] IN BLOOD BY AUTOMATED COUNT: 13.9 % (ref 12.3–15.4)
GFR SERPL CREATININE-BSD FRML MDRD: 65 ML/MIN/1.73
GLOBULIN UR ELPH-MCNC: 2.3 GM/DL (ref 2.5–3.8)
GLUCOSE BLD-MCNC: 94 MG/DL (ref 65–99)
HCT VFR BLD AUTO: 35.7 % (ref 34–46.6)
HDLC SERPL QL: 2.15
HDLC SERPL-MCNC: 71 MG/DL
HGB BLD-MCNC: 12.1 G/DL (ref 12–15.9)
LDLC/HDLC SERPL: 0.98 {RATIO}
LYMPHOCYTES # BLD AUTO: 2 10*3/MM3 (ref 0.7–3.1)
LYMPHOCYTES NFR BLD AUTO: 31 % (ref 19.6–45.3)
MAGNESIUM SERPL-MCNC: 2.3 MG/DL (ref 1.8–2.5)
MCH RBC QN AUTO: 31.4 PG (ref 26.6–33)
MCHC RBC AUTO-ENTMCNC: 34 G/DL (ref 31.5–35.7)
MCV RBC AUTO: 92.5 FL (ref 79–97)
MONOCYTES # BLD AUTO: 0.6 10*3/MM3 (ref 0.1–0.9)
MONOCYTES NFR BLD AUTO: 10.1 % (ref 5–12)
NEUTROPHILS # BLD AUTO: 3.5 10*3/MM3 (ref 1.7–7)
NEUTROPHILS NFR BLD AUTO: 55.1 % (ref 42.7–76)
NRBC BLD AUTO-RTO: 0.2 /100 WBC (ref 0–0.2)
PLATELET # BLD AUTO: 216 10*3/MM3 (ref 140–450)
PMV BLD AUTO: 9.9 FL (ref 6–12)
POTASSIUM BLD-SCNC: 4.3 MMOL/L (ref 3.6–5.1)
PROT SERPL-MCNC: 6.6 G/DL (ref 6.1–7.9)
RBC # BLD AUTO: 3.86 10*6/MM3 (ref 3.77–5.28)
SODIUM BLD-SCNC: 133 MMOL/L (ref 136–144)
TRIGL SERPL-MCNC: 61 MG/DL
TSH SERPL DL<=0.05 MIU/L-ACNC: 1.4 UIU/ML (ref 0.34–5.6)
VIT B12 BLD-MCNC: 1432 PG/ML (ref 180–914)
VLDLC SERPL-MCNC: 12.2 MG/DL
WBC NRBC COR # BLD: 6.3 10*3/MM3 (ref 3.4–10.8)

## 2019-09-17 PROCEDURE — 84443 ASSAY THYROID STIM HORMONE: CPT | Performed by: PREVENTIVE MEDICINE

## 2019-09-17 PROCEDURE — 85025 COMPLETE CBC W/AUTO DIFF WBC: CPT | Performed by: PREVENTIVE MEDICINE

## 2019-09-17 PROCEDURE — 80061 LIPID PANEL: CPT | Performed by: PREVENTIVE MEDICINE

## 2019-09-17 PROCEDURE — 99213 OFFICE O/P EST LOW 20 MIN: CPT | Performed by: PREVENTIVE MEDICINE

## 2019-09-17 PROCEDURE — 99396 PREV VISIT EST AGE 40-64: CPT | Performed by: PREVENTIVE MEDICINE

## 2019-09-17 PROCEDURE — 83735 ASSAY OF MAGNESIUM: CPT | Performed by: PREVENTIVE MEDICINE

## 2019-09-17 PROCEDURE — 36415 COLL VENOUS BLD VENIPUNCTURE: CPT | Performed by: PREVENTIVE MEDICINE

## 2019-09-17 PROCEDURE — 82306 VITAMIN D 25 HYDROXY: CPT | Performed by: PREVENTIVE MEDICINE

## 2019-09-17 PROCEDURE — 80053 COMPREHEN METABOLIC PANEL: CPT | Performed by: PREVENTIVE MEDICINE

## 2019-09-17 PROCEDURE — 82607 VITAMIN B-12: CPT | Performed by: PREVENTIVE MEDICINE

## 2019-09-17 RX ORDER — HYDRALAZINE HYDROCHLORIDE 25 MG/1
TABLET, FILM COATED ORAL
Qty: 270 TABLET | Refills: 3 | Status: SHIPPED | OUTPATIENT
Start: 2019-09-17 | End: 2019-12-13

## 2019-09-17 RX ORDER — VALACYCLOVIR HYDROCHLORIDE 500 MG/1
500 TABLET, FILM COATED ORAL DAILY
Refills: 4 | COMMUNITY
Start: 2019-09-12 | End: 2020-02-17 | Stop reason: CLARIF

## 2019-09-17 NOTE — PATIENT INSTRUCTIONS
Day that gets flu shot at work.  Pneumonia 13, Tdap  and [employer] shot pharmacy.  Get mammogram and Low dose lung cancer screening.

## 2019-09-17 NOTE — PROGRESS NOTES
Subjective   Melodie Carlos is a 54 y.o. female presents for   Chief Complaint   Patient presents with   • Annual Exam     Needs pap, mammogram and will get vaccinations and consider eye and dental.  Binta since started increase in BP med.  Nonsmoker for 6 years.  Health Maintenance Due   Topic Date Due   • ZOSTER VACCINE (1 of 2) 01/07/2013   • LUNG CANCER SCREENING  01/07/2018   • ANNUAL PHYSICAL  05/11/2019   • PAP SMEAR  07/26/2019   • INFLUENZA VACCINE  08/01/2019       History of Present Illness     Vitals:    09/17/19 1315 09/17/19 1320   BP: 158/99 151/91   BP Location: Left arm Right arm   Patient Position: Sitting Sitting   Cuff Size: Adult Adult   Pulse: 62    Resp: 16    Temp: 98.3 °F (36.8 °C)    TempSrc: Oral    SpO2: 98%    Weight: 65.9 kg (145 lb 3.2 oz)        Current Outpatient Medications on File Prior to Visit   Medication Sig Dispense Refill   • atenolol (TENORMIN) 50 MG tablet Take 50 mg by mouth Daily.  3   • atorvastatin (LIPITOR) 20 MG tablet TAKE ONE TABLET BY MOUTH AT BEDTIME 90 tablet 3   • Cholecalciferol (VITAMIN D) 2000 units capsule VITAMIN D 2000 UNIT CAPS     • hydrALAZINE (APRESOLINE) 25 MG tablet Three tablets am and two at noon and pm 90 tablet 3   • HYDROcodone-acetaminophen (NORCO)  MG per tablet Take 1 tablet by mouth Every 6 (Six) Hours.  0   • levothyroxine (SYNTHROID, LEVOTHROID) 25 MCG tablet Take 1 tablet by mouth daily on empty stomach for one hour  0   • tiZANidine (ZANAFLEX) 4 MG tablet Take 4 mg by mouth every night at bedtime.  5   • valACYclovir (VALTREX) 500 MG tablet Take 500 mg by mouth Daily. FOR 30 DAYS  4     No current facility-administered medications on file prior to visit.        The following portions of the patient's history were reviewed and updated as appropriate: allergies, current medications, past family history, past medical history, past social history, past surgical history and problem list.    Review of Systems   Constitutional:  Positive for fatigue.        Night sweats   HENT: Negative.  Negative for sinus pressure and sore throat.    Eyes: Negative.    Respiratory: Negative.  Negative for cough.    Cardiovascular: Negative.    Gastrointestinal: Negative.    Endocrine: Negative.    Genitourinary: Negative.    Musculoskeletal: Negative.    Skin: Negative.    Allergic/Immunologic: Positive for environmental allergies.   Neurological: Negative.    Hematological: Negative.    Psychiatric/Behavioral: Negative.        Objective   Physical Exam   Constitutional: She is oriented to person, place, and time. She appears well-developed.   HENT:   Head: Normocephalic and atraumatic.   Eyes: Conjunctivae and EOM are normal. Pupils are equal, round, and reactive to light.   Neck: Normal range of motion.   Cardiovascular: Normal rate and regular rhythm.   Pulmonary/Chest:   Decreased breath sound bryon     Abdominal: Soft. Bowel sounds are normal.   Musculoskeletal: Normal range of motion.   Lymphadenopathy:     She has no cervical adenopathy.   Neurological: She is alert and oriented to person, place, and time.   Skin: Skin is warm and dry.   Psychiatric: She has a normal mood and affect.   Nursing note and vitals reviewed.    PHQ-9 Total Score:      Assessment/Plan   Melodie was seen today for annual exam.    Diagnoses and all orders for this visit:    Encounter for annual general medical examination with abnormal findings in adult    Hyperlipidemia, unspecified hyperlipidemia type  -     Lipid Panel    Essential hypertension  -     CBC Auto Differential  -     Comprehensive Metabolic Panel    Hypothyroidism, unspecified type  -     TSH    Anemia, unspecified type  -     CBC Auto Differential    Cerebrovascular accident (CVA), unspecified mechanism (CMS/HCC)  -     Magnesium  -     Vitamin B12    Former smoker  -     CT Chest Low Dose Wo; Future    Encounter for screening for lung cancer  -     CT Chest Low Dose Wo; Future    Screening for breast  cancer  -     Mammo Screening Digital Tomosynthesis Bilateral With CAD; Future    Pap smear for cervical cancer screening    Other orders  -     Vitamin D 25 Hydroxy  -     hydrALAZINE (APRESOLINE) 25 MG tablet; Three tablets three times daily.        Patient Instructions   Day that gets flu shot at work.  Pneumonia 13, Tdap  and [employer] shot pharmacy.  Get mammogram and Low dose lung cancer screening.

## 2019-09-18 LAB — 25(OH)D3 SERPL-MCNC: 64.8 NG/ML (ref 30–100)

## 2019-10-25 RX ORDER — LEVOTHYROXINE SODIUM 0.03 MG/1
TABLET ORAL
Qty: 90 TABLET | Refills: 3 | Status: SHIPPED | OUTPATIENT
Start: 2019-10-25 | End: 2020-10-19

## 2019-11-04 ENCOUNTER — OFFICE VISIT (OUTPATIENT)
Dept: FAMILY MEDICINE CLINIC | Facility: CLINIC | Age: 54
End: 2019-11-04

## 2019-11-04 VITALS
TEMPERATURE: 98.2 F | OXYGEN SATURATION: 98 % | WEIGHT: 144 LBS | BODY MASS INDEX: 24.72 KG/M2 | SYSTOLIC BLOOD PRESSURE: 119 MMHG | RESPIRATION RATE: 16 BRPM | HEART RATE: 57 BPM | DIASTOLIC BLOOD PRESSURE: 77 MMHG

## 2019-11-04 DIAGNOSIS — K52.9 GASTROENTERITIS: Primary | ICD-10-CM

## 2019-11-04 PROCEDURE — 99213 OFFICE O/P EST LOW 20 MIN: CPT | Performed by: PREVENTIVE MEDICINE

## 2019-11-04 NOTE — PROGRESS NOTES
Andre Carlos is a 54 y.o. female presents for   Chief Complaint   Patient presents with   • Diarrhea     8 times yesterday and 2 times today    Eating eggs, nausea but no vomiting.  No fever or dysuria.  No blood in diarrhea    Health Maintenance Due   Topic Date Due   • ZOSTER VACCINE (1 of 2) 01/07/2013   • LUNG CANCER SCREENING  01/07/2018   • ANNUAL PHYSICAL  05/11/2019   • PAP SMEAR  07/26/2019   • INFLUENZA VACCINE  08/01/2019       History of Present Illness     Vitals:    11/04/19 1526 11/04/19 1530   BP: 121/79 119/77   BP Location: Right arm Left arm   Patient Position: Sitting Sitting   Cuff Size: Adult Adult   Pulse: 57    Resp: 16    Temp: 98.2 °F (36.8 °C)    TempSrc: Oral    SpO2: 98%    Weight: 65.3 kg (144 lb)      Body mass index is 24.72 kg/m².    Current Outpatient Medications on File Prior to Visit   Medication Sig Dispense Refill   • atenolol (TENORMIN) 50 MG tablet Take 50 mg by mouth Daily.  3   • atorvastatin (LIPITOR) 20 MG tablet TAKE ONE TABLET BY MOUTH AT BEDTIME 90 tablet 3   • Cholecalciferol (VITAMIN D) 2000 units capsule VITAMIN D 2000 UNIT CAPS     • hydrALAZINE (APRESOLINE) 25 MG tablet Three tablets three times daily. 270 tablet 3   • HYDROcodone-acetaminophen (NORCO)  MG per tablet Take 1 tablet by mouth Every 6 (Six) Hours.  0   • levothyroxine (SYNTHROID, LEVOTHROID) 25 MCG tablet Take 1 tablet by mouth daily on empty stomach for one hour 90 tablet 3   • tiZANidine (ZANAFLEX) 4 MG tablet Take 4 mg by mouth every night at bedtime.  5   • valACYclovir (VALTREX) 500 MG tablet Take 500 mg by mouth Daily. FOR 30 DAYS  4     No current facility-administered medications on file prior to visit.        The following portions of the patient's history were reviewed and updated as appropriate: allergies, current medications, past family history, past medical history, past social history, past surgical history and problem list.    Review of Systems   Constitutional:  Negative.    HENT: Negative.  Negative for sinus pressure and sore throat.    Eyes: Negative.    Respiratory: Negative.  Negative for cough.    Cardiovascular: Negative.    Gastrointestinal: Positive for abdominal pain, diarrhea and nausea.   Endocrine: Negative.    Genitourinary: Negative.    Musculoskeletal: Negative.    Skin: Negative.    Allergic/Immunologic: Positive for environmental allergies.   Neurological: Negative.    Hematological: Negative.    Psychiatric/Behavioral: Negative.        Objective   Physical Exam   Constitutional: She is oriented to person, place, and time. She appears well-developed.   HENT:   Head: Normocephalic and atraumatic.   Eyes: Conjunctivae and EOM are normal. Pupils are equal, round, and reactive to light.   Neck: Normal range of motion.   Cardiovascular: Normal rate and regular rhythm.   Pulmonary/Chest: Effort normal and breath sounds normal.   Abdominal: Soft. Bowel sounds are normal.   Musculoskeletal: Normal range of motion.   Lymphadenopathy:     She has no cervical adenopathy.   Neurological: She is alert and oriented to person, place, and time.   Skin: Skin is warm and dry.   Psychiatric: She has a normal mood and affect.   Nursing note and vitals reviewed.    PHQ-9 Total Score:      Assessment/Plan   Melodie was seen today for diarrhea.    Diagnoses and all orders for this visit:    Gastroenteritis        Patient Instructions   Avoid spicey greasy and limit dairy.  Can use peptobismal  Rehydrate with pedialyte

## 2019-12-01 RX ORDER — ATENOLOL 50 MG/1
TABLET ORAL
Qty: 90 TABLET | Refills: 1 | Status: SHIPPED | OUTPATIENT
Start: 2019-12-01 | End: 2020-05-26

## 2019-12-13 ENCOUNTER — OFFICE VISIT (OUTPATIENT)
Dept: FAMILY MEDICINE CLINIC | Facility: CLINIC | Age: 54
End: 2019-12-13

## 2019-12-13 VITALS
RESPIRATION RATE: 16 BRPM | TEMPERATURE: 98.5 F | BODY MASS INDEX: 24.11 KG/M2 | SYSTOLIC BLOOD PRESSURE: 149 MMHG | OXYGEN SATURATION: 98 % | DIASTOLIC BLOOD PRESSURE: 103 MMHG | HEIGHT: 64 IN | HEART RATE: 51 BPM | WEIGHT: 141.2 LBS

## 2019-12-13 DIAGNOSIS — I10 ESSENTIAL HYPERTENSION: ICD-10-CM

## 2019-12-13 DIAGNOSIS — J01.10 ACUTE FRONTAL SINUSITIS, RECURRENCE NOT SPECIFIED: Primary | ICD-10-CM

## 2019-12-13 PROCEDURE — 99214 OFFICE O/P EST MOD 30 MIN: CPT | Performed by: PREVENTIVE MEDICINE

## 2019-12-13 RX ORDER — TRIFLURIDINE 10 MG/ML
1 SOLUTION OPHTHALMIC 3 TIMES DAILY
Refills: 0 | COMMUNITY
Start: 2019-12-11 | End: 2022-05-10

## 2019-12-13 RX ORDER — AMOXICILLIN AND CLAVULANATE POTASSIUM 875; 125 MG/1; MG/1
1 TABLET, FILM COATED ORAL 2 TIMES DAILY
Qty: 20 TABLET | Refills: 0 | Status: SHIPPED | OUTPATIENT
Start: 2019-12-13 | End: 2020-02-03

## 2019-12-13 RX ORDER — HYDRALAZINE HYDROCHLORIDE 50 MG/1
TABLET, FILM COATED ORAL
Qty: 180 TABLET | Refills: 1 | Status: SHIPPED | OUTPATIENT
Start: 2019-12-13 | End: 2020-02-03

## 2019-12-13 NOTE — PROGRESS NOTES
"Andre Carlos is a 54 y.o. female presents for   Chief Complaint   Patient presents with   • Headache     Awoke yesterday with headache and feels like getting maxillary sinus pressure.  No drainage.  Vomiting or diarrhea.  No cough.  No cold meds and taking antihypertinsives  Health Maintenance Due   Topic Date Due   • PNEUMOCOCCAL VACCINE (19-64 HIGHEST RISK) (1 of 3 - PCV13) 01/07/1982   • ZOSTER VACCINE (1 of 2) 01/07/2013   • LUNG CANCER SCREENING  01/07/2018   • PAP SMEAR  07/26/2019   • INFLUENZA VACCINE  08/01/2019       History of Present Illness     Vitals:    12/13/19 1446 12/13/19 1450   BP: 163/90 (!) 149/103   BP Location: Right arm Left arm   Patient Position: Sitting Sitting   Cuff Size: Adult Adult   Pulse: 51    Resp: 16    Temp: 98.5 °F (36.9 °C)    TempSrc: Oral    SpO2: 98%    Weight: 64 kg (141 lb 3.2 oz)    Height: 162.6 cm (64\")      Body mass index is 24.24 kg/m².    Current Outpatient Medications on File Prior to Visit   Medication Sig Dispense Refill   • atenolol (TENORMIN) 50 MG tablet TAKE ONE TABLET BY MOUTH EVERY DAY 90 tablet 1   • atorvastatin (LIPITOR) 20 MG tablet TAKE ONE TABLET BY MOUTH AT BEDTIME 90 tablet 3   • Cholecalciferol (VITAMIN D) 2000 units capsule VITAMIN D 2000 UNIT CAPS     • HYDROcodone-acetaminophen (NORCO)  MG per tablet Take 1 tablet by mouth Every 6 (Six) Hours.  0   • levothyroxine (SYNTHROID, LEVOTHROID) 25 MCG tablet Take 1 tablet by mouth daily on empty stomach for one hour 90 tablet 3   • tiZANidine (ZANAFLEX) 4 MG tablet Take 4 mg by mouth every night at bedtime.  5   • trifluridine (VIROPTIC) 1 % ophthalmic solution Administer 1 drop to both eyes 3 (Three) Times a Day.  0   • valACYclovir (VALTREX) 500 MG tablet Take 500 mg by mouth Daily. FOR 30 DAYS  4   • [DISCONTINUED] hydrALAZINE (APRESOLINE) 25 MG tablet Three tablets three times daily. 270 tablet 3     No current facility-administered medications on file prior to visit.  "       The following portions of the patient's history were reviewed and updated as appropriate: allergies, current medications, past family history, past medical history, past social history, past surgical history and problem list.    Review of Systems   Constitutional: Negative.    HENT: Positive for congestion and sinus pressure. Negative for sore throat.    Eyes: Negative.    Respiratory: Positive for cough.    Cardiovascular: Negative.    Gastrointestinal: Positive for nausea.        Rumbling stomach-no diarrhea   Endocrine: Negative.    Genitourinary: Negative.    Musculoskeletal: Negative.    Skin: Negative.    Allergic/Immunologic: Positive for environmental allergies.   Neurological: Negative.    Hematological: Negative.    Psychiatric/Behavioral: Negative.        Objective   Physical Exam   Constitutional: She is oriented to person, place, and time. She appears well-developed.   HENT:   Head: Normocephalic and atraumatic.   Mild nasal and throat erythema.   Eyes: Pupils are equal, round, and reactive to light. Conjunctivae and EOM are normal.   Neck: Normal range of motion.   Cardiovascular: Normal rate and regular rhythm.   Pulmonary/Chest: Effort normal and breath sounds normal.   Abdominal: Soft. Bowel sounds are normal.   Musculoskeletal: Normal range of motion.   Lymphadenopathy:     She has no cervical adenopathy.   Neurological: She is alert and oriented to person, place, and time.   Skin: Skin is warm and dry.   Psychiatric: She has a normal mood and affect.   Nursing note and vitals reviewed.    PHQ-9 Total Score:      Assessment/Plan   Melodie was seen today for headache.    Diagnoses and all orders for this visit:    Acute frontal sinusitis, recurrence not specified/sinus xrays/  Spoke with radiologist and acute Shenoid sinusitis and will treat with antibiotics    Essential hypertension/increase hydralazine to 50 BID        Patient Instructions   Rest and plenty of fluids.  Get sinus xrays today.  Will call tomorrow if needs antibiotics.  If worsens to [facility]

## 2019-12-13 NOTE — PATIENT INSTRUCTIONS
Rest and plenty of fluids.  Get sinus xrays today. Will call tomorrow if needs antibiotics.  If worsens to [facility]

## 2019-12-16 ENCOUNTER — TELEPHONE (OUTPATIENT)
Dept: FAMILY MEDICINE CLINIC | Facility: CLINIC | Age: 54
End: 2019-12-16

## 2019-12-16 NOTE — TELEPHONE ENCOUNTER
PT CAME IN TODAY THINKING SHE HAD AN APPT TODAY BUT ITS TOMORROW. STATES NEEDS A NOTE FOR TODAY FOR MISSING WORK. STATES SHE IS FEELING BETTER. PLEASE ADVISE.

## 2020-02-03 ENCOUNTER — OFFICE VISIT (OUTPATIENT)
Dept: FAMILY MEDICINE CLINIC | Facility: CLINIC | Age: 55
End: 2020-02-03

## 2020-02-03 VITALS
OXYGEN SATURATION: 97 % | BODY MASS INDEX: 24.21 KG/M2 | DIASTOLIC BLOOD PRESSURE: 115 MMHG | HEIGHT: 64 IN | SYSTOLIC BLOOD PRESSURE: 187 MMHG | RESPIRATION RATE: 16 BRPM | WEIGHT: 141.8 LBS | HEART RATE: 55 BPM | TEMPERATURE: 98.6 F

## 2020-02-03 DIAGNOSIS — I10 ESSENTIAL HYPERTENSION: Primary | ICD-10-CM

## 2020-02-03 DIAGNOSIS — R53.81 MALAISE: ICD-10-CM

## 2020-02-03 DIAGNOSIS — J01.10 ACUTE FRONTAL SINUSITIS, RECURRENCE NOT SPECIFIED: ICD-10-CM

## 2020-02-03 DIAGNOSIS — R51.9 ACUTE NONINTRACTABLE HEADACHE, UNSPECIFIED HEADACHE TYPE: ICD-10-CM

## 2020-02-03 DIAGNOSIS — R19.7 DIARRHEA, UNSPECIFIED TYPE: ICD-10-CM

## 2020-02-03 PROCEDURE — 99213 OFFICE O/P EST LOW 20 MIN: CPT | Performed by: NURSE PRACTITIONER

## 2020-02-03 RX ORDER — CIPROFLOXACIN HYDROCHLORIDE 3.5 MG/ML
1 SOLUTION/ DROPS TOPICAL 3 TIMES DAILY
COMMUNITY
Start: 2020-01-22 | End: 2020-02-17

## 2020-02-03 RX ORDER — HYDRALAZINE HYDROCHLORIDE 50 MG/1
50 TABLET, FILM COATED ORAL 3 TIMES DAILY
Qty: 180 TABLET | Refills: 1 | Status: SHIPPED | OUTPATIENT
Start: 2020-02-03 | End: 2021-06-21 | Stop reason: DRUGHIGH

## 2020-02-03 RX ORDER — AMOXICILLIN AND CLAVULANATE POTASSIUM 875; 125 MG/1; MG/1
1 TABLET, FILM COATED ORAL 2 TIMES DAILY
Qty: 20 TABLET | Refills: 0 | Status: SHIPPED | OUTPATIENT
Start: 2020-02-03 | End: 2020-02-17

## 2020-02-03 NOTE — PATIENT INSTRUCTIONS
Monitor BP at home 3-4 times a week for 2 weeks and call results to the office    Diarrhea, Adult  Diarrhea is when you pass loose and watery poop (stool) often. Diarrhea can make you feel weak and cause you to lose water in your body (get dehydrated). Losing water in your body can cause you to:  · Feel tired and thirsty.  · Have a dry mouth.  · Go pee (urinate) less often.  Diarrhea often lasts 2-3 days. However, it can last longer if it is a sign of something more serious. It is important to treat your diarrhea as told by your doctor.  Follow these instructions at home:  Eating and drinking         Follow these instructions as told by your doctor:  · Take an ORS (oral rehydration solution). This is a drink that helps you replace fluids and minerals your body lost. It is sold at pharmacies and stores.  · Drink plenty of fluids, such as:  ? Water.  ? Ice chips.  ? Diluted fruit juice.  ? Low-calorie sports drinks.  ? Milk, if you want.  · Avoid drinking fluids that have a lot of sugar or caffeine in them.  · Eat bland, easy-to-digest foods in small amounts as you are able. These foods include:  ? Bananas.  ? Applesauce.  ? Rice.  ? Low-fat (lean) meats.  ? Toast.  ? Crackers.  · Avoid alcohol.  · Avoid spicy or fatty foods.    Medicines  · Take over-the-counter and prescription medicines only as told by your doctor.  · If you were prescribed an antibiotic medicine, take it as told by your doctor. Do not stop using the antibiotic even if you start to feel better.  General instructions    · Wash your hands often using soap and water. If soap and water are not available, use a hand . Others in your home should wash their hands as well. Hands should be washed:  ? After using the toilet or changing a diaper.  ? Before preparing, cooking, or serving food.  ? While caring for a sick person.  ? While visiting someone in a hospital.  · Drink enough fluid to keep your pee (urine) pale yellow.  · Rest at home while  "you get better.  · Watch your condition for any changes.  · Take a warm bath to help with any burning or pain from having diarrhea.  · Keep all follow-up visits as told by your doctor. This is important.  Contact a doctor if:  · You have a fever.  · Your diarrhea gets worse.  · You have new symptoms.  · You cannot keep fluids down.  · You feel light-headed or dizzy.  · You have a headache.  · You have muscle cramps.  Get help right away if:  · You have chest pain.  · You feel very weak or you pass out (faint).  · You have bloody or black poop or poop that looks like tar.  · You have very bad pain, cramping, or bloating in your belly (abdomen).  · You have trouble breathing or you are breathing very quickly.  · Your heart is beating very quickly.  · Your skin feels cold and clammy.  · You feel confused.  · You have signs of losing too much water in your body, such as:  ? Dark pee, very little pee, or no pee.  ? Cracked lips.  ? Dry mouth.  ? Sunken eyes.  ? Sleepiness.  ? Weakness.  Summary  · Diarrhea is when you pass loose and watery poop (stool) often.  · Diarrhea can make you feel weak and cause you to lose water in your body (get dehydrated).  · Take an ORS (oral rehydration solution). This is a drink that is sold at pharmacies and stores.  · Eat bland, easy-to-digest foods in small amounts as you are able.  · Contact a doctor if your condition gets worse. Get help right away if you have signs that you have lost too much water in your body.  This information is not intended to replace advice given to you by your health care provider. Make sure you discuss any questions you have with your health care provider.  Document Released: 06/05/2009 Document Revised: 05/24/2019 Document Reviewed: 05/24/2019  [employer] Interactive Patient Education © 2019 [employer]    DASH Eating Plan  DASH stands for \"Dietary Approaches to Stop Hypertension.\" The DASH eating plan is a healthy eating plan that has been shown to reduce " "high blood pressure (hypertension). It may also reduce your risk for type 2 diabetes, heart disease, and stroke. The DASH eating plan may also help with weight loss.  What are tips for following this plan?    General guidelines  · Avoid eating more than 2,300 mg (milligrams) of salt (sodium) a day. If you have hypertension, you may need to reduce your sodium intake to 1,500 mg a day.  · Limit alcohol intake to no more than 1 drink a day for nonpregnant women and 2 drinks a day for men. One drink equals 12 oz of beer, 5 oz of wine, or 1½ oz of hard liquor.  · Work with your health care provider to maintain a healthy body weight or to lose weight. Ask what an ideal weight is for you.  · Get at least 30 minutes of exercise that causes your heart to beat faster (aerobic exercise) most days of the week. Activities may include walking, swimming, or biking.  · Work with your health care provider or diet and nutrition specialist (dietitian) to adjust your eating plan to your individual calorie needs.  Reading food labels    · Check food labels for the amount of sodium per serving. Choose foods with less than 5 percent of the Daily Value of sodium. Generally, foods with less than 300 mg of sodium per serving fit into this eating plan.  · To find whole grains, look for the word \"whole\" as the first word in the ingredient list.  Shopping  · Buy products labeled as \"low-sodium\" or \"no salt added.\"  · Buy fresh foods. Avoid canned foods and premade or frozen meals.  Cooking  · Avoid adding salt when cooking. Use salt-free seasonings or herbs instead of table salt or sea salt. Check with your health care provider or pharmacist before using salt substitutes.  · Do not tai foods. Cook foods using healthy methods such as baking, boiling, grilling, and broiling instead.  · Cook with heart-healthy oils, such as olive, canola, soybean, or sunflower oil.  Meal planning  · Eat a balanced diet that includes:  ? 5 or more servings of " fruits and vegetables each day. At each meal, try to fill half of your plate with fruits and vegetables.  ? Up to 6-8 servings of whole grains each day.  ? Less than 6 oz of lean meat, poultry, or fish each day. A 3-oz serving of meat is about the same size as a deck of cards. One egg equals 1 oz.  ? 2 servings of low-fat dairy each day.  ? A serving of nuts, seeds, or beans 5 times each week.  ? Heart-healthy fats. Healthy fats called Omega-3 fatty acids are found in foods such as flaxseeds and coldwater fish, like sardines, salmon, and mackerel.  · Limit how much you eat of the following:  ? Canned or prepackaged foods.  ? Food that is high in trans fat, such as fried foods.  ? Food that is high in saturated fat, such as fatty meat.  ? Sweets, desserts, sugary drinks, and other foods with added sugar.  ? Full-fat dairy products.  · Do not salt foods before eating.  · Try to eat at least 2 vegetarian meals each week.  · Eat more home-cooked food and less restaurant, buffet, and fast food.  · When eating at a restaurant, ask that your food be prepared with less salt or no salt, if possible.  What foods are recommended?  The items listed may not be a complete list. Talk with your dietitian about what dietary choices are best for you.  Grains  Whole-grain or whole-wheat bread. Whole-grain or whole-wheat pasta. Brown rice. Oatmeal. Quinoa. Bulgur. Whole-grain and low-sodium cereals. Cathryn bread. Low-fat, low-sodium crackers. Whole-wheat flour tortillas.  Vegetables  Fresh or frozen vegetables (raw, steamed, roasted, or grilled). Low-sodium or reduced-sodium tomato and vegetable juice. Low-sodium or reduced-sodium tomato sauce and tomato paste. Low-sodium or reduced-sodium canned vegetables.  Fruits  All fresh, dried, or frozen fruit. Canned fruit in natural juice (without added sugar).  Meat and other protein foods  Skinless chicken or turkey. Ground chicken or turkey. Pork with fat trimmed off. Fish and seafood. Egg  whites. Dried beans, peas, or lentils. Unsalted nuts, nut butters, and seeds. Unsalted canned beans. Lean cuts of beef with fat trimmed off. Low-sodium, lean deli meat.  Dairy  Low-fat (1%) or fat-free (skim) milk. Fat-free, low-fat, or reduced-fat cheeses. Nonfat, low-sodium ricotta or cottage cheese. Low-fat or nonfat yogurt. Low-fat, low-sodium cheese.  Fats and oils  Soft margarine without trans fats. Vegetable oil. Low-fat, reduced-fat, or light mayonnaise and salad dressings (reduced-sodium). Canola, safflower, olive, soybean, and sunflower oils. Avocado.  Seasoning and other foods  Herbs. Spices. Seasoning mixes without salt. Unsalted popcorn and pretzels. Fat-free sweets.  What foods are not recommended?  The items listed may not be a complete list. Talk with your dietitian about what dietary choices are best for you.  Grains  Baked goods made with fat, such as croissants, muffins, or some breads. Dry pasta or rice meal packs.  Vegetables  Creamed or fried vegetables. Vegetables in a cheese sauce. Regular canned vegetables (not low-sodium or reduced-sodium). Regular canned tomato sauce and paste (not low-sodium or reduced-sodium). Regular tomato and vegetable juice (not low-sodium or reduced-sodium). Pickles. Olives.  Fruits  Canned fruit in a light or heavy syrup. Fried fruit. Fruit in cream or butter sauce.  Meat and other protein foods  Fatty cuts of meat. Ribs. Fried meat. Marie. Sausage. Bologna and other processed lunch meats. Salami. Fatback. Hotdogs. Bratwurst. Salted nuts and seeds. Canned beans with added salt. Canned or smoked fish. Whole eggs or egg yolks. Chicken or turkey with skin.  Dairy  Whole or 2% milk, cream, and half-and-half. Whole or full-fat cream cheese. Whole-fat or sweetened yogurt. Full-fat cheese. Nondairy creamers. Whipped toppings. Processed cheese and cheese spreads.  Fats and oils  Butter. Stick margarine. Lard. Shortening. Ghee. Marie fat. Tropical oils, such as coconut,  palm kernel, or palm oil.  Seasoning and other foods  Salted popcorn and pretzels. Onion salt, garlic salt, seasoned salt, table salt, and sea salt. Worcestershire sauce. Tartar sauce. Barbecue sauce. Teriyaki sauce. Soy sauce, including reduced-sodium. Steak sauce. Canned and packaged gravies. Fish sauce. Oyster sauce. Cocktail sauce. Horseradish that you find on the shelf. Ketchup. Mustard. Meat flavorings and tenderizers. Bouillon cubes. Hot sauce and Tabasco sauce. Premade or packaged marinades. Premade or packaged taco seasonings. Relishes. Regular salad dressings.  Where to find more information:  · National [employer], [employer], [employer] [place]: www.nhlbi.nih.gov  · American Heart Association: www.heart.org  Summary  · The DASH eating plan is a healthy eating plan that has been shown to reduce high blood pressure (hypertension). It may also reduce your risk for type 2 diabetes, heart disease, and stroke.  · With the DASH eating plan, you should limit salt (sodium) intake to 2,300 mg a day. If you have hypertension, you may need to reduce your sodium intake to 1,500 mg a day.  · When on the DASH eating plan, aim to eat more fresh fruits and vegetables, whole grains, lean proteins, low-fat dairy, and heart-healthy fats.  · Work with your health care provider or diet and nutrition specialist (dietitian) to adjust your eating plan to your individual calorie needs.  This information is not intended to replace advice given to you by your health care provider. Make sure you discuss any questions you have with your health care provider.  Document Released: 12/06/2012 Document Revised: 12/11/2017 Document Reviewed: 12/11/2017  [employer] Interactive Patient Education © 2019 [employer]

## 2020-02-03 NOTE — PROGRESS NOTES
"Andre Carlos is a 55 y.o. female presents for   Chief Complaint   Patient presents with   • URI   • Headache   • Diarrhea       Health Maintenance Due   Topic Date Due   • PNEUMOCOCCAL VACCINE (19-64 HIGHEST RISK) (1 of 3 - PCV13) 01/07/1982   • ZOSTER VACCINE (1 of 2) 01/07/2013   • LUNG CANCER SCREENING  01/07/2018   • PAP SMEAR  07/26/2019   • INFLUENZA VACCINE  08/01/2019       History of Present Illness   Pt reports she has been feeling bad x 2 days with body aches, chills, diarrhea, headache, and cough and congestion.  Pt states several episodes of diarrhea yesterday, but none today.  Pt states headache on left side of head and feels it may be related to her elevated blood pressure.  Pt has been taking hydralazine twice daily since last appointment and though the dosage was increased at that appointment.  Prior to 12/19, she had been taking hydralazine three times daily.      Vitals:    02/03/20 1006   BP: (!) 187/115   BP Location: Left arm   Patient Position: Sitting   Cuff Size: Adult   Pulse: 55   Resp: 16   Temp: 98.6 °F (37 °C)   TempSrc: Oral   SpO2: 97%   Weight: 64.3 kg (141 lb 12.8 oz)   Height: 162.6 cm (64\")     Body mass index is 24.34 kg/m².    Current Outpatient Medications on File Prior to Visit   Medication Sig Dispense Refill   • atenolol (TENORMIN) 50 MG tablet TAKE ONE TABLET BY MOUTH EVERY DAY 90 tablet 1   • atorvastatin (LIPITOR) 20 MG tablet TAKE ONE TABLET BY MOUTH AT BEDTIME 90 tablet 3   • Cholecalciferol (VITAMIN D) 2000 units capsule VITAMIN D 2000 UNIT CAPS     • ciprofloxacin (CILOXAN) 0.3 % ophthalmic solution Administer 1 drop to both eyes 3 (Three) Times a Day.     • HYDROcodone-acetaminophen (NORCO)  MG per tablet Take 1 tablet by mouth Every 6 (Six) Hours.  0   • levothyroxine (SYNTHROID, LEVOTHROID) 25 MCG tablet Take 1 tablet by mouth daily on empty stomach for one hour 90 tablet 3   • tiZANidine (ZANAFLEX) 4 MG tablet Take 4 mg by mouth every night " at bedtime.  5   • trifluridine (VIROPTIC) 1 % ophthalmic solution Administer 1 drop to both eyes 3 (Three) Times a Day.  0   • valACYclovir (VALTREX) 500 MG tablet Take 500 mg by mouth Daily. FOR 30 DAYS  4   • [DISCONTINUED] hydrALAZINE (APRESOLINE) 50 MG tablet One tablet twice daily 180 tablet 1   • [DISCONTINUED] amoxicillin-clavulanate (AUGMENTIN) 875-125 MG per tablet Take 1 tablet by mouth 2 (Two) Times a Day. 20 tablet 0     No current facility-administered medications on file prior to visit.        The following portions of the patient's history were reviewed and updated as appropriate: allergies, current medications, past family history, past medical history, past social history, past surgical history and problem list.    Review of Systems   Constitutional: Positive for chills, fatigue and fever.   HENT: Positive for rhinorrhea, sinus pressure and sore throat.    Eyes: Negative for blurred vision.   Respiratory: Positive for cough. Negative for shortness of breath.    Cardiovascular: Negative for chest pain.   Gastrointestinal: Positive for diarrhea. Negative for abdominal pain, nausea and vomiting.   Musculoskeletal: Positive for myalgias. Negative for arthralgias and joint swelling.   Skin: Negative for color change.   Neurological: Positive for headache. Negative for dizziness.   Psychiatric/Behavioral: Negative for behavioral problems.       Objective   Physical Exam   Constitutional: She is oriented to person, place, and time. She appears well-developed and well-nourished.   HENT:   Head: Normocephalic and atraumatic.   Right Ear: External ear normal.   Left Ear: External ear normal.   Nose: Mucosal edema present. Right sinus exhibits maxillary sinus tenderness and frontal sinus tenderness. Left sinus exhibits maxillary sinus tenderness and frontal sinus tenderness.   Mouth/Throat: Uvula is midline and mucous membranes are normal. Posterior oropharyngeal erythema present. Tonsils are 3+ on the right.  Tonsils are 3+ on the left.   Eyes: Pupils are equal, round, and reactive to light. EOM are normal.   Neck: Normal range of motion.   Cardiovascular: Normal rate, regular rhythm, normal heart sounds and intact distal pulses.   Pulmonary/Chest: Effort normal and breath sounds normal.   Abdominal: Soft. Bowel sounds are increased. There is no tenderness.   Musculoskeletal: Normal range of motion.   Lymphadenopathy:     She has cervical adenopathy.   Neurological: She is alert and oriented to person, place, and time.   Skin: Skin is warm and dry.   Psychiatric: She has a normal mood and affect. Her behavior is normal. Judgment normal.   Nursing note and vitals reviewed.    PHQ-9 Total Score:      Assessment/Plan   Melodie was seen today for uri, headache and diarrhea.    Diagnoses and all orders for this visit:    Essential hypertension        - Resume taking hydralazine three times daily and monitor blood pressures at home.  Call results to office after 2 weeks.        - DASH diet information also sent with pt    Diarrhea, unspecified type       - Counseled on BRAT diet and information sent home with pt       - Increase fluids     Malaise      - Return to work after 24 hours of antibiotics    Acute nonintractable headache, unspecified headache type    Sinusitis     -  Complete antibiotic course as directed    Other orders  -     amoxicillin-clavulanate (AUGMENTIN) 875-125 MG per tablet; Take 1 tablet by mouth 2 (Two) Times a Day.  -     hydrALAZINE (APRESOLINE) 50 MG tablet; Take 1 tablet by mouth 3 (Three) Times a Day. One tablet twice daily        Patient Instructions   Monitor BP at home 3-4 times a week for 2 weeks and call results to the office    Diarrhea, Adult  Diarrhea is when you pass loose and watery poop (stool) often. Diarrhea can make you feel weak and cause you to lose water in your body (get dehydrated). Losing water in your body can cause you to:  · Feel tired and thirsty.  · Have a dry mouth.  · Go  pee (urinate) less often.  Diarrhea often lasts 2-3 days. However, it can last longer if it is a sign of something more serious. It is important to treat your diarrhea as told by your doctor.  Follow these instructions at home:  Eating and drinking         Follow these instructions as told by your doctor:  · Take an ORS (oral rehydration solution). This is a drink that helps you replace fluids and minerals your body lost. It is sold at pharmacies and stores.  · Drink plenty of fluids, such as:  ? Water.  ? Ice chips.  ? Diluted fruit juice.  ? Low-calorie sports drinks.  ? Milk, if you want.  · Avoid drinking fluids that have a lot of sugar or caffeine in them.  · Eat bland, easy-to-digest foods in small amounts as you are able. These foods include:  ? Bananas.  ? Applesauce.  ? Rice.  ? Low-fat (lean) meats.  ? Toast.  ? Crackers.  · Avoid alcohol.  · Avoid spicy or fatty foods.    Medicines  · Take over-the-counter and prescription medicines only as told by your doctor.  · If you were prescribed an antibiotic medicine, take it as told by your doctor. Do not stop using the antibiotic even if you start to feel better.  General instructions    · Wash your hands often using soap and water. If soap and water are not available, use a hand . Others in your home should wash their hands as well. Hands should be washed:  ? After using the toilet or changing a diaper.  ? Before preparing, cooking, or serving food.  ? While caring for a sick person.  ? While visiting someone in a hospital.  · Drink enough fluid to keep your pee (urine) pale yellow.  · Rest at home while you get better.  · Watch your condition for any changes.  · Take a warm bath to help with any burning or pain from having diarrhea.  · Keep all follow-up visits as told by your doctor. This is important.  Contact a doctor if:  · You have a fever.  · Your diarrhea gets worse.  · You have new symptoms.  · You cannot keep fluids down.  · You feel  "light-headed or dizzy.  · You have a headache.  · You have muscle cramps.  Get help right away if:  · You have chest pain.  · You feel very weak or you pass out (faint).  · You have bloody or black poop or poop that looks like tar.  · You have very bad pain, cramping, or bloating in your belly (abdomen).  · You have trouble breathing or you are breathing very quickly.  · Your heart is beating very quickly.  · Your skin feels cold and clammy.  · You feel confused.  · You have signs of losing too much water in your body, such as:  ? Dark pee, very little pee, or no pee.  ? Cracked lips.  ? Dry mouth.  ? Sunken eyes.  ? Sleepiness.  ? Weakness.  Summary  · Diarrhea is when you pass loose and watery poop (stool) often.  · Diarrhea can make you feel weak and cause you to lose water in your body (get dehydrated).  · Take an ORS (oral rehydration solution). This is a drink that is sold at pharmacies and stores.  · Eat bland, easy-to-digest foods in small amounts as you are able.  · Contact a doctor if your condition gets worse. Get help right away if you have signs that you have lost too much water in your body.  This information is not intended to replace advice given to you by your health care provider. Make sure you discuss any questions you have with your health care provider.  Document Released: 06/05/2009 Document Revised: 05/24/2019 Document Reviewed: 05/24/2019  [employer] Interactive Patient Education © 2019 [employer]    DASH Eating Plan  DASH stands for \"Dietary Approaches to Stop Hypertension.\" The DASH eating plan is a healthy eating plan that has been shown to reduce high blood pressure (hypertension). It may also reduce your risk for type 2 diabetes, heart disease, and stroke. The DASH eating plan may also help with weight loss.  What are tips for following this plan?    General guidelines  · Avoid eating more than 2,300 mg (milligrams) of salt (sodium) a day. If you have hypertension, you may need to " "reduce your sodium intake to 1,500 mg a day.  · Limit alcohol intake to no more than 1 drink a day for nonpregnant women and 2 drinks a day for men. One drink equals 12 oz of beer, 5 oz of wine, or 1½ oz of hard liquor.  · Work with your health care provider to maintain a healthy body weight or to lose weight. Ask what an ideal weight is for you.  · Get at least 30 minutes of exercise that causes your heart to beat faster (aerobic exercise) most days of the week. Activities may include walking, swimming, or biking.  · Work with your health care provider or diet and nutrition specialist (dietitian) to adjust your eating plan to your individual calorie needs.  Reading food labels    · Check food labels for the amount of sodium per serving. Choose foods with less than 5 percent of the Daily Value of sodium. Generally, foods with less than 300 mg of sodium per serving fit into this eating plan.  · To find whole grains, look for the word \"whole\" as the first word in the ingredient list.  Shopping  · Buy products labeled as \"low-sodium\" or \"no salt added.\"  · Buy fresh foods. Avoid canned foods and premade or frozen meals.  Cooking  · Avoid adding salt when cooking. Use salt-free seasonings or herbs instead of table salt or sea salt. Check with your health care provider or pharmacist before using salt substitutes.  · Do not tai foods. Cook foods using healthy methods such as baking, boiling, grilling, and broiling instead.  · Cook with heart-healthy oils, such as olive, canola, soybean, or sunflower oil.  Meal planning  · Eat a balanced diet that includes:  ? 5 or more servings of fruits and vegetables each day. At each meal, try to fill half of your plate with fruits and vegetables.  ? Up to 6-8 servings of whole grains each day.  ? Less than 6 oz of lean meat, poultry, or fish each day. A 3-oz serving of meat is about the same size as a deck of cards. One egg equals 1 oz.  ? 2 servings of low-fat dairy each day.  ? A " serving of nuts, seeds, or beans 5 times each week.  ? Heart-healthy fats. Healthy fats called Omega-3 fatty acids are found in foods such as flaxseeds and coldwater fish, like sardines, salmon, and mackerel.  · Limit how much you eat of the following:  ? Canned or prepackaged foods.  ? Food that is high in trans fat, such as fried foods.  ? Food that is high in saturated fat, such as fatty meat.  ? Sweets, desserts, sugary drinks, and other foods with added sugar.  ? Full-fat dairy products.  · Do not salt foods before eating.  · Try to eat at least 2 vegetarian meals each week.  · Eat more home-cooked food and less restaurant, buffet, and fast food.  · When eating at a restaurant, ask that your food be prepared with less salt or no salt, if possible.  What foods are recommended?  The items listed may not be a complete list. Talk with your dietitian about what dietary choices are best for you.  Grains  Whole-grain or whole-wheat bread. Whole-grain or whole-wheat pasta. Brown rice. Oatmeal. Quinoa. Bulgur. Whole-grain and low-sodium cereals. Cathryn bread. Low-fat, low-sodium crackers. Whole-wheat flour tortillas.  Vegetables  Fresh or frozen vegetables (raw, steamed, roasted, or grilled). Low-sodium or reduced-sodium tomato and vegetable juice. Low-sodium or reduced-sodium tomato sauce and tomato paste. Low-sodium or reduced-sodium canned vegetables.  Fruits  All fresh, dried, or frozen fruit. Canned fruit in natural juice (without added sugar).  Meat and other protein foods  Skinless chicken or turkey. Ground chicken or turkey. Pork with fat trimmed off. Fish and seafood. Egg whites. Dried beans, peas, or lentils. Unsalted nuts, nut butters, and seeds. Unsalted canned beans. Lean cuts of beef with fat trimmed off. Low-sodium, lean deli meat.  Dairy  Low-fat (1%) or fat-free (skim) milk. Fat-free, low-fat, or reduced-fat cheeses. Nonfat, low-sodium ricotta or cottage cheese. Low-fat or nonfat yogurt. Low-fat,  low-sodium cheese.  Fats and oils  Soft margarine without trans fats. Vegetable oil. Low-fat, reduced-fat, or light mayonnaise and salad dressings (reduced-sodium). Canola, safflower, olive, soybean, and sunflower oils. Avocado.  Seasoning and other foods  Herbs. Spices. Seasoning mixes without salt. Unsalted popcorn and pretzels. Fat-free sweets.  What foods are not recommended?  The items listed may not be a complete list. Talk with your dietitian about what dietary choices are best for you.  Grains  Baked goods made with fat, such as croissants, muffins, or some breads. Dry pasta or rice meal packs.  Vegetables  Creamed or fried vegetables. Vegetables in a cheese sauce. Regular canned vegetables (not low-sodium or reduced-sodium). Regular canned tomato sauce and paste (not low-sodium or reduced-sodium). Regular tomato and vegetable juice (not low-sodium or reduced-sodium). Pickles. Olives.  Fruits  Canned fruit in a light or heavy syrup. Fried fruit. Fruit in cream or butter sauce.  Meat and other protein foods  Fatty cuts of meat. Ribs. Fried meat. Marie. Sausage. Bologna and other processed lunch meats. Salami. Fatback. Hotdogs. Bratwurst. Salted nuts and seeds. Canned beans with added salt. Canned or smoked fish. Whole eggs or egg yolks. Chicken or turkey with skin.  Dairy  Whole or 2% milk, cream, and half-and-half. Whole or full-fat cream cheese. Whole-fat or sweetened yogurt. Full-fat cheese. Nondairy creamers. Whipped toppings. Processed cheese and cheese spreads.  Fats and oils  Butter. Stick margarine. Lard. Shortening. Ghee. Marie fat. Tropical oils, such as coconut, palm kernel, or palm oil.  Seasoning and other foods  Salted popcorn and pretzels. Onion salt, garlic salt, seasoned salt, table salt, and sea salt. Worcestershire sauce. Tartar sauce. Barbecue sauce. Teriyaki sauce. Soy sauce, including reduced-sodium. Steak sauce. Canned and packaged gravies. Fish sauce. Oyster sauce. Cocktail sauce.  Horseradish that you find on the shelf. Ketchup. Mustard. Meat flavorings and tenderizers. Bouillon cubes. Hot sauce and Tabasco sauce. Premade or packaged marinades. Premade or packaged taco seasonings. Relishes. Regular salad dressings.  Where to find more information:  · National Heart, Lung, and Blood [place]: www.nhlbi.nih.gov  · American Heart Association: www.heart.org  Summary  · The DASH eating plan is a healthy eating plan that has been shown to reduce high blood pressure (hypertension). It may also reduce your risk for type 2 diabetes, heart disease, and stroke.  · With the DASH eating plan, you should limit salt (sodium) intake to 2,300 mg a day. If you have hypertension, you may need to reduce your sodium intake to 1,500 mg a day.  · When on the DASH eating plan, aim to eat more fresh fruits and vegetables, whole grains, lean proteins, low-fat dairy, and heart-healthy fats.  · Work with your health care provider or diet and nutrition specialist (dietitian) to adjust your eating plan to your individual calorie needs.  This information is not intended to replace advice given to you by your health care provider. Make sure you discuss any questions you have with your health care provider.  Document Released: 12/06/2012 Document Revised: 12/11/2017 Document Reviewed: 12/11/2017  [employer] Interactive Patient Education © 2019 [employer]

## 2020-02-17 ENCOUNTER — OFFICE VISIT (OUTPATIENT)
Dept: FAMILY MEDICINE CLINIC | Facility: CLINIC | Age: 55
End: 2020-02-17

## 2020-02-17 VITALS
SYSTOLIC BLOOD PRESSURE: 132 MMHG | HEIGHT: 64 IN | RESPIRATION RATE: 16 BRPM | WEIGHT: 145 LBS | BODY MASS INDEX: 24.75 KG/M2 | TEMPERATURE: 98.5 F | OXYGEN SATURATION: 94 % | HEART RATE: 58 BPM | DIASTOLIC BLOOD PRESSURE: 84 MMHG

## 2020-02-17 DIAGNOSIS — S09.90XA INJURY OF HEAD, INITIAL ENCOUNTER: Primary | ICD-10-CM

## 2020-02-17 PROCEDURE — 99213 OFFICE O/P EST LOW 20 MIN: CPT | Performed by: NURSE PRACTITIONER

## 2020-02-17 RX ORDER — FAMCICLOVIR 500 MG/1
500 TABLET ORAL DAILY
COMMUNITY
Start: 2020-02-13 | End: 2022-03-22

## 2020-02-17 NOTE — PROGRESS NOTES
"Andre Carlos is a 55 y.o. female presents for   Chief Complaint   Patient presents with   • Head Injury       Health Maintenance Due   Topic Date Due   • ZOSTER VACCINE (1 of 2) 01/07/2013   • LUNG CANCER SCREENING  01/07/2018   • PAP SMEAR  07/26/2019   • INFLUENZA VACCINE  08/01/2019       History of Present Illness   Pt reports raising up and hitting back of her head on a cabinet at home 2 days ago.  Pt reports ongoing tenderness of area, inability to lie on back of head, and an associated headache.  Pt denies loss of conciseness at the time of accident.  Pt reports taking aleve intermittently for pain without relief of pain.  Pt denies vomiting or dizziness.     Vitals:    02/17/20 0855 02/17/20 0901   BP: 145/94 132/84   BP Location: Right arm Left arm   Patient Position: Sitting Sitting   Cuff Size: Adult Adult   Pulse: 54 58   Resp: 16    Temp: 98.5 °F (36.9 °C)    TempSrc: Oral    SpO2: 94%    Weight: 65.8 kg (145 lb)    Height: 162.6 cm (64\")      Body mass index is 24.89 kg/m².    Current Outpatient Medications on File Prior to Visit   Medication Sig Dispense Refill   • atenolol (TENORMIN) 50 MG tablet TAKE ONE TABLET BY MOUTH EVERY DAY 90 tablet 1   • atorvastatin (LIPITOR) 20 MG tablet TAKE ONE TABLET BY MOUTH AT BEDTIME 90 tablet 3   • Cholecalciferol (VITAMIN D) 2000 units capsule VITAMIN D 2000 UNIT CAPS     • famciclovir (FAMVIR) 500 MG tablet Take 500 mg by mouth 2 (Two) Times a Day.     • hydrALAZINE (APRESOLINE) 50 MG tablet Take 1 tablet by mouth 3 (Three) Times a Day. One tablet twice daily (Patient taking differently: Take 50 mg by mouth 3 (Three) Times a Day. One tablet three times daily) 180 tablet 1   • HYDROcodone-acetaminophen (NORCO)  MG per tablet Take 1 tablet by mouth Every 6 (Six) Hours.  0   • levothyroxine (SYNTHROID, LEVOTHROID) 25 MCG tablet Take 1 tablet by mouth daily on empty stomach for one hour 90 tablet 3   • tiZANidine (ZANAFLEX) 4 MG tablet Take 4 " mg by mouth every night at bedtime.  5   • trifluridine (VIROPTIC) 1 % ophthalmic solution Administer 1 drop to both eyes 3 (Three) Times a Day.  0   • [DISCONTINUED] amoxicillin-clavulanate (AUGMENTIN) 875-125 MG per tablet Take 1 tablet by mouth 2 (Two) Times a Day. 20 tablet 0   • [DISCONTINUED] ciprofloxacin (CILOXAN) 0.3 % ophthalmic solution Administer 1 drop to both eyes 3 (Three) Times a Day.     • [DISCONTINUED] valACYclovir (VALTREX) 500 MG tablet Take 500 mg by mouth Daily. FOR 30 DAYS  4     No current facility-administered medications on file prior to visit.        The following portions of the patient's history were reviewed and updated as appropriate: allergies, current medications, past family history, past medical history, past social history, past surgical history and problem list.    Review of Systems   Constitutional: Negative for chills and fever.   HENT: Negative for sinus pressure and sore throat.    Eyes: Negative for blurred vision and visual disturbance.   Respiratory: Negative for cough and shortness of breath.    Cardiovascular: Negative for chest pain.   Gastrointestinal: Negative for abdominal pain.   Musculoskeletal: Negative for arthralgias and joint swelling.   Skin: Negative for color change.   Neurological: Positive for headache. Negative for dizziness.   Psychiatric/Behavioral: Negative for behavioral problems.       Objective   Physical Exam   Constitutional: She is oriented to person, place, and time. She appears well-developed and well-nourished.       HENT:   Head: Normocephalic and atraumatic.   Right Ear: External ear normal.   Left Ear: External ear normal.   Nose: Nose normal.   Mouth/Throat: Oropharynx is clear and moist.   Eyes: Pupils are equal, round, and reactive to light. Conjunctivae and EOM are normal.   Neck: Normal range of motion.   Cardiovascular: Normal rate.   Pulmonary/Chest: Effort normal.   Musculoskeletal: Normal range of motion.   Neurological: She is  alert and oriented to person, place, and time.   Skin: Skin is warm and dry.   Psychiatric: She has a normal mood and affect. Her behavior is normal. Judgment and thought content normal.   Nursing note and vitals reviewed.    PHQ-9 Total Score:      Assessment/Plan   Melodie was seen today for head injury.    Diagnoses and all orders for this visit:    Injury of head, initial encounter  Comments:  ice intermittently posterior head, tylenol/ibuprofen prn, rest        Patient Instructions   Tylenol and ibuprofen intermittently as needed for pain  Ice pack to area intermittently  Heat to neck for relaxation  Head Injury, Adult  There are many types of head injuries. They can be as minor as a bump. Some head injuries can be worse. Worse injuries include:  · A strong hit to the head that hurts the brain (concussion).  · A bruise of the brain (contusion). This means there is bleeding in the brain that can cause swelling.  · A cracked skull (skull fracture).  · Bleeding in the brain that gathers, gets thick (makes a clot), and forms a bump (hematoma).  Most problems from a head injury come in the first 24 hours. However, you may still have side effects up to 7-10 days after your injury. It is important to watch your condition for any changes.  Follow these instructions at home:  Activity  · Rest as much as possible.  · Avoid activities that are hard or tiring.  · Make sure you get enough sleep.  · Limit activities that need a lot of thought or attention, such as:  ? Watching TV.  ? Playing memory games and puzzles.  ? Job-related work or homework.  ? Working on the computer, social media, and texting.  · Avoid activities that could cause another head injury until your doctor says it is okay. This includes playing sports.  · Ask your doctor when it is safe for you to go back to your normal activities, such as work or school. Ask your doctor for a step-by-step plan for slowly going back to your normal activities.  · Ask your  doctor when you can drive, ride a bicycle, or use heavy machinery. Never do these activities if you are dizzy.  Lifestyle  · Do not drink alcohol until your doctor says it is okay.  · Avoid drug use.  · If it is harder than usual to remember things, write them down.  · If you are easily distracted, try to do one thing at a time.  · Talk with family members or close friends when making important decisions.  · Tell your friends, family, a trusted coworker, and  about your injury, symptoms, and limits (restrictions). Have them watch for any problems that are new or getting worse.  General instructions  · Take over-the-counter and prescription medicines only as told by your doctor.  · Have someone stay with you for 24 hours after your head injury. This person should watch you for any changes in your symptoms and be ready to get help.  · Keep all follow-up visits as told by your doctor. This is important.  How is this prevented?  · Work on your balance and strength. This can help you avoid falls.  · Wear a seatbelt when you are in a moving vehicle.  · Wear a helmet when:  ? Riding a bicycle.  ? Skiing.  ? Doing any other sport or activity that has a risk of injury.  · Drink alcohol only in moderation.  · Make your home safer by:  ? Getting rid of clutter from the floors and stairs, like things that can make you trip.  ? Using grab bars in bathrooms and handrails by stairs.  ? Placing non-slip mats on floors and in bathtubs.  ? Putting more light in dim areas.  Get help right away if:  · You have:  ? A very bad (severe) headache that is not helped by medicine.  ? Trouble walking or weakness in your arms and legs.  ? Clear or bloody fluid coming from your nose or ears.  ? Changes in your seeing (vision).  ? Jerky movements that you cannot control (seizure).  · You throw up (vomit).  · Your symptoms get worse.  · You lose balance.  · Your speech is slurred.  · You pass out.  · You are sleepier and have trouble  staying awake.  · The black centers of your eyes (pupils) change in size.  These symptoms may be an emergency. Do not wait to see if the symptoms will go away. Get medical help right away. Call your local emergency services (911 in the [place] Do not drive yourself to the hospital.  This information is not intended to replace advice given to you by your health care provider. Make sure you discuss any questions you have with your health care provider.  Document Released: 11/30/2009 Document Revised: 09/11/2019 Document Reviewed: 06/27/2017  [employer] Interactive Patient Education © 2019 [employer]

## 2020-02-17 NOTE — PATIENT INSTRUCTIONS
Tylenol and ibuprofen intermittently as needed for pain  Ice pack to area intermittently  Heat to neck for relaxation  Head Injury, Adult  There are many types of head injuries. They can be as minor as a bump. Some head injuries can be worse. Worse injuries include:  · A strong hit to the head that hurts the brain (concussion).  · A bruise of the brain (contusion). This means there is bleeding in the brain that can cause swelling.  · A cracked skull (skull fracture).  · Bleeding in the brain that gathers, gets thick (makes a clot), and forms a bump (hematoma).  Most problems from a head injury come in the first 24 hours. However, you may still have side effects up to 7-10 days after your injury. It is important to watch your condition for any changes.  Follow these instructions at home:  Activity  · Rest as much as possible.  · Avoid activities that are hard or tiring.  · Make sure you get enough sleep.  · Limit activities that need a lot of thought or attention, such as:  ? Watching TV.  ? Playing memory games and puzzles.  ? Job-related work or homework.  ? Working on the computer, social media, and texting.  · Avoid activities that could cause another head injury until your doctor says it is okay. This includes playing sports.  · Ask your doctor when it is safe for you to go back to your normal activities, such as work or school. Ask your doctor for a step-by-step plan for slowly going back to your normal activities.  · Ask your doctor when you can drive, ride a bicycle, or use heavy machinery. Never do these activities if you are dizzy.  Lifestyle  · Do not drink alcohol until your doctor says it is okay.  · Avoid drug use.  · If it is harder than usual to remember things, write them down.  · If you are easily distracted, try to do one thing at a time.  · Talk with family members or close friends when making important decisions.  · Tell your friends, family, a trusted coworker, and  about your  injury, symptoms, and limits (restrictions). Have them watch for any problems that are new or getting worse.  General instructions  · Take over-the-counter and prescription medicines only as told by your doctor.  · Have someone stay with you for 24 hours after your head injury. This person should watch you for any changes in your symptoms and be ready to get help.  · Keep all follow-up visits as told by your doctor. This is important.  How is this prevented?  · Work on your balance and strength. This can help you avoid falls.  · Wear a seatbelt when you are in a moving vehicle.  · Wear a helmet when:  ? Riding a bicycle.  ? Skiing.  ? Doing any other sport or activity that has a risk of injury.  · Drink alcohol only in moderation.  · Make your home safer by:  ? Getting rid of clutter from the floors and stairs, like things that can make you trip.  ? Using grab bars in bathrooms and handrails by stairs.  ? Placing non-slip mats on floors and in bathtubs.  ? Putting more light in dim areas.  Get help right away if:  · You have:  ? A very bad (severe) headache that is not helped by medicine.  ? Trouble walking or weakness in your arms and legs.  ? Clear or bloody fluid coming from your nose or ears.  ? Changes in your seeing (vision).  ? Jerky movements that you cannot control (seizure).  · You throw up (vomit).  · Your symptoms get worse.  · You lose balance.  · Your speech is slurred.  · You pass out.  · You are sleepier and have trouble staying awake.  · The black centers of your eyes (pupils) change in size.  These symptoms may be an emergency. Do not wait to see if the symptoms will go away. Get medical help right away. Call your local emergency services (911 in the [place] Do not drive yourself to the hospital.  This information is not intended to replace advice given to you by your health care provider. Make sure you discuss any questions you have with your health care provider.  Document Released: 11/30/2009  Document Revised: 09/11/2019 Document Reviewed: 06/27/2017  [employer] Interactive Patient Education © 2019 [employer]

## 2020-03-04 ENCOUNTER — LAB (OUTPATIENT)
Dept: LAB | Facility: HOSPITAL | Age: 55
End: 2020-03-04

## 2020-03-04 ENCOUNTER — OFFICE VISIT (OUTPATIENT)
Dept: FAMILY MEDICINE CLINIC | Facility: CLINIC | Age: 55
End: 2020-03-04

## 2020-03-04 VITALS
BODY MASS INDEX: 24.89 KG/M2 | HEIGHT: 64 IN | SYSTOLIC BLOOD PRESSURE: 147 MMHG | RESPIRATION RATE: 18 BRPM | WEIGHT: 145.8 LBS | OXYGEN SATURATION: 97 % | HEART RATE: 59 BPM | TEMPERATURE: 98.2 F | DIASTOLIC BLOOD PRESSURE: 88 MMHG

## 2020-03-04 DIAGNOSIS — R19.7 DIARRHEA, UNSPECIFIED TYPE: Primary | ICD-10-CM

## 2020-03-04 DIAGNOSIS — R19.7 DIARRHEA, UNSPECIFIED TYPE: ICD-10-CM

## 2020-03-04 DIAGNOSIS — J02.9 SORE THROAT: ICD-10-CM

## 2020-03-04 LAB
ADV 40+41 DNA STL QL NAA+NON-PROBE: NOT DETECTED
ASTRO TYP 1-8 RNA STL QL NAA+NON-PROBE: NOT DETECTED
C CAYETANENSIS DNA STL QL NAA+NON-PROBE: NOT DETECTED
CAMPY SP DNA.DIARRHEA STL QL NAA+PROBE: NOT DETECTED
CRYPTOSP STL CULT: NOT DETECTED
E COLI DNA SPEC QL NAA+PROBE: NOT DETECTED
E HISTOLYT AG STL-ACNC: NOT DETECTED
EAEC PAA PLAS AGGR+AATA ST NAA+NON-PRB: NOT DETECTED
EC STX1 + STX2 GENES STL NAA+PROBE: NOT DETECTED
EPEC EAE GENE STL QL NAA+NON-PROBE: NOT DETECTED
ETEC LTA+ST1A+ST1B TOX ST NAA+NON-PROBE: NOT DETECTED
EXPIRATION DATE: NORMAL
G LAMBLIA DNA SPEC QL NAA+PROBE: NOT DETECTED
INTERNAL CONTROL: NORMAL
LACTOFERRIN STL QL LA: POSITIVE
Lab: NORMAL
NOROVIRUS GI+II RNA STL QL NAA+NON-PROBE: NOT DETECTED
P SHIGELLOIDES DNA STL QL NAA+PROBE: NOT DETECTED
RV RNA STL NAA+PROBE: NOT DETECTED
S PYO AG THROAT QL: NEGATIVE
SALMONELLA DNA SPEC QL NAA+PROBE: NOT DETECTED
SAPO I+II+IV+V RNA STL QL NAA+NON-PROBE: NOT DETECTED
SHIGELLA SP+EIEC IPAH STL QL NAA+PROBE: NOT DETECTED
V CHOLERAE DNA SPEC QL NAA+PROBE: NOT DETECTED
VIBRIO DNA SPEC NAA+PROBE: NOT DETECTED
YERSINIA STL CULT: NOT DETECTED

## 2020-03-04 PROCEDURE — 87449 NOS EACH ORGANISM AG IA: CPT

## 2020-03-04 PROCEDURE — 83630 LACTOFERRIN FECAL (QUAL): CPT

## 2020-03-04 PROCEDURE — 87880 STREP A ASSAY W/OPTIC: CPT | Performed by: NURSE PRACTITIONER

## 2020-03-04 PROCEDURE — 99213 OFFICE O/P EST LOW 20 MIN: CPT | Performed by: NURSE PRACTITIONER

## 2020-03-04 PROCEDURE — 0097U HC BIOFIRE FILMARRAY GI PANEL: CPT

## 2020-03-04 PROCEDURE — 87324 CLOSTRIDIUM AG IA: CPT

## 2020-03-04 RX ORDER — LANOLIN ALCOHOL/MO/W.PET/CERES
2500 CREAM (GRAM) TOPICAL DAILY
COMMUNITY
End: 2022-03-22

## 2020-03-04 RX ORDER — DICYCLOMINE HYDROCHLORIDE 10 MG/1
10 CAPSULE ORAL
Qty: 120 CAPSULE | Refills: 1 | Status: SHIPPED | OUTPATIENT
Start: 2020-03-04 | End: 2022-03-22

## 2020-03-04 NOTE — PROGRESS NOTES
"Andre Carlos is a 55 y.o. female presents for   Chief Complaint   Patient presents with   • Nausea   • Diarrhea       Health Maintenance Due   Topic Date Due   • ZOSTER VACCINE (1 of 2) 01/07/2013   • LUNG CANCER SCREENING  01/07/2018   • PAP SMEAR  07/26/2019   • INFLUENZA VACCINE  08/01/2019       History of Present Illness   Pt reports diarrhea x 3 weeks.  Pt was recently treated with augmentin for URI, but was having diarrhea at that time.  Pt has been taking pepto bismol and imodium without of relief of diarrhea.  Pt denies abdominal pain, and states it occurs every time she eats.  Pt states diarrhea is pure water, no blood, and denies fever.  Pt treated for colitis 5/2019 with similar symptoms.    Vitals:    03/04/20 1028 03/04/20 1029   BP: 148/84 147/88   BP Location: Right arm Left arm   Patient Position: Sitting Sitting   Cuff Size: Adult Adult   Pulse: 55 59   Resp: 18    Temp: 98.2 °F (36.8 °C)    TempSrc: Oral    SpO2: 97%    Weight: 66.1 kg (145 lb 12.8 oz)    Height: 162.6 cm (64\")      Body mass index is 25.03 kg/m².    Current Outpatient Medications on File Prior to Visit   Medication Sig Dispense Refill   • atenolol (TENORMIN) 50 MG tablet TAKE ONE TABLET BY MOUTH EVERY DAY 90 tablet 1   • atorvastatin (LIPITOR) 20 MG tablet TAKE ONE TABLET BY MOUTH AT BEDTIME 90 tablet 3   • Cholecalciferol (VITAMIN D) 2000 units capsule VITAMIN D 2000 UNIT CAPS     • famciclovir (FAMVIR) 500 MG tablet Take 500 mg by mouth 2 (Two) Times a Day.     • hydrALAZINE (APRESOLINE) 50 MG tablet Take 1 tablet by mouth 3 (Three) Times a Day. One tablet twice daily (Patient taking differently: Take 50 mg by mouth 3 (Three) Times a Day. One tablet three times daily) 180 tablet 1   • HYDROcodone-acetaminophen (NORCO)  MG per tablet Take 1 tablet by mouth Every 6 (Six) Hours.  0   • levothyroxine (SYNTHROID, LEVOTHROID) 25 MCG tablet Take 1 tablet by mouth daily on empty stomach for one hour 90 tablet 3 "   • tiZANidine (ZANAFLEX) 4 MG tablet Take 4 mg by mouth every night at bedtime.  5   • trifluridine (VIROPTIC) 1 % ophthalmic solution Administer 1 drop to both eyes 3 (Three) Times a Day.  0   • vitamin B-12 (CYANOCOBALAMIN) 1000 MCG tablet Take 1,000 mcg by mouth Daily.       No current facility-administered medications on file prior to visit.        The following portions of the patient's history were reviewed and updated as appropriate: allergies, current medications, past family history, past medical history, past social history, past surgical history and problem list.    Review of Systems   Constitutional: Positive for fatigue. Negative for chills and fever.   HENT: Positive for sore throat. Negative for sinus pressure.    Eyes: Negative for blurred vision.   Respiratory: Negative for cough and shortness of breath.    Cardiovascular: Negative for chest pain.   Gastrointestinal: Positive for diarrhea. Negative for abdominal pain, nausea and vomiting.   Musculoskeletal: Negative for arthralgias and joint swelling.   Skin: Negative for color change.   Neurological: Negative for dizziness.   Psychiatric/Behavioral: Negative for behavioral problems.       Objective   Physical Exam   Constitutional: She is oriented to person, place, and time. She appears well-developed and well-nourished.   HENT:   Head: Normocephalic and atraumatic.   Right Ear: External ear normal.   Left Ear: External ear normal.   Nose: Nose normal.   Mouth/Throat: Oropharyngeal exudate and posterior oropharyngeal erythema present. Tonsils are 2+ on the right. Tonsils are 2+ on the left.   Eyes: Pupils are equal, round, and reactive to light. EOM are normal.   Neck: Normal range of motion. Neck supple.   Cardiovascular: Normal rate, regular rhythm, normal heart sounds and intact distal pulses.   Pulmonary/Chest: Effort normal and breath sounds normal.   Abdominal: Soft. Bowel sounds are increased. There is no hepatosplenomegaly. There is  tenderness in the left lower quadrant. There is no rebound, no guarding and negative Gomez's sign. No hernia.   Musculoskeletal: Normal range of motion.   Lymphadenopathy:     She has cervical adenopathy.   Neurological: She is alert and oriented to person, place, and time.   Skin: Skin is warm and dry.   Psychiatric: She has a normal mood and affect. Her behavior is normal. Judgment normal.   Nursing note and vitals reviewed.    PHQ-9 Total Score:      Assessment/Plan   Melodie was seen today for nausea and diarrhea.    Diagnoses and all orders for this visit:    Diarrhea, unspecified type  Comments:  stool studies ordered due to recent antibiotic use.  will treat for colitis if negative.  bentyl prescribed and pt instructed on use.   Orders:  -     Clostridium Difficile EIA - Stool, Per Rectum; Future  -     Stool Culture (Reference Lab) - Stool, Per Rectum; Future  -     Ova & Parasite Examination - Stool, Per Rectum; Future  -     Fecal Lactoferrin - Stool, Per Rectum; Future    Sore throat  Comments:  warm salt water gargles  Orders:  -     POCT rapid strep A    Other orders  -     dicyclomine (BENTYL) 10 MG capsule; Take 1 capsule by mouth 4 (Four) Times a Day Before Meals & at Bedtime.        Patient Instructions   Diarrhea, Adult  Diarrhea is when you pass loose and watery poop (stool) often. Diarrhea can make you feel weak and cause you to lose water in your body (get dehydrated). Losing water in your body can cause you to:  · Feel tired and thirsty.  · Have a dry mouth.  · Go pee (urinate) less often.  Diarrhea often lasts 2-3 days. However, it can last longer if it is a sign of something more serious. It is important to treat your diarrhea as told by your doctor.  Follow these instructions at home:  Eating and drinking         Follow these instructions as told by your doctor:  · Take an ORS (oral rehydration solution). This is a drink that helps you replace fluids and minerals your body lost. It is sold  at pharmacies and stores.  · Drink plenty of fluids, such as:  ? Water.  ? Ice chips.  ? Diluted fruit juice.  ? Low-calorie sports drinks.  ? Milk, if you want.  · Avoid drinking fluids that have a lot of sugar or caffeine in them.  · Eat bland, easy-to-digest foods in small amounts as you are able. These foods include:  ? Bananas.  ? Applesauce.  ? Rice.  ? Low-fat (lean) meats.  ? Toast.  ? Crackers.  · Avoid alcohol.  · Avoid spicy or fatty foods.    Medicines  · Take over-the-counter and prescription medicines only as told by your doctor.  · If you were prescribed an antibiotic medicine, take it as told by your doctor. Do not stop using the antibiotic even if you start to feel better.  General instructions    · Wash your hands often using soap and water. If soap and water are not available, use a hand . Others in your home should wash their hands as well. Hands should be washed:  ? After using the toilet or changing a diaper.  ? Before preparing, cooking, or serving food.  ? While caring for a sick person.  ? While visiting someone in a hospital.  · Drink enough fluid to keep your pee (urine) pale yellow.  · Rest at home while you get better.  · Watch your condition for any changes.  · Take a warm bath to help with any burning or pain from having diarrhea.  · Keep all follow-up visits as told by your doctor. This is important.  Contact a doctor if:  · You have a fever.  · Your diarrhea gets worse.  · You have new symptoms.  · You cannot keep fluids down.  · You feel light-headed or dizzy.  · You have a headache.  · You have muscle cramps.  Get help right away if:  · You have chest pain.  · You feel very weak or you pass out (faint).  · You have bloody or black poop or poop that looks like tar.  · You have very bad pain, cramping, or bloating in your belly (abdomen).  · You have trouble breathing or you are breathing very quickly.  · Your heart is beating very quickly.  · Your skin feels cold and  clammy.  · You feel confused.  · You have signs of losing too much water in your body, such as:  ? Dark pee, very little pee, or no pee.  ? Cracked lips.  ? Dry mouth.  ? Sunken eyes.  ? Sleepiness.  ? Weakness.  Summary  · Diarrhea is when you pass loose and watery poop (stool) often.  · Diarrhea can make you feel weak and cause you to lose water in your body (get dehydrated).  · Take an ORS (oral rehydration solution). This is a drink that is sold at pharmacies and stores.  · Eat bland, easy-to-digest foods in small amounts as you are able.  · Contact a doctor if your condition gets worse. Get help right away if you have signs that you have lost too much water in your body.  This information is not intended to replace advice given to you by your health care provider. Make sure you discuss any questions you have with your health care provider.  Document Released: 06/05/2009 Document Revised: 05/24/2019 Document Reviewed: 05/24/2019  [employer] Interactive Patient Education © 2020 [employer]

## 2020-03-04 NOTE — PATIENT INSTRUCTIONS
Diarrhea, Adult  Diarrhea is when you pass loose and watery poop (stool) often. Diarrhea can make you feel weak and cause you to lose water in your body (get dehydrated). Losing water in your body can cause you to:  · Feel tired and thirsty.  · Have a dry mouth.  · Go pee (urinate) less often.  Diarrhea often lasts 2-3 days. However, it can last longer if it is a sign of something more serious. It is important to treat your diarrhea as told by your doctor.  Follow these instructions at home:  Eating and drinking         Follow these instructions as told by your doctor:  · Take an ORS (oral rehydration solution). This is a drink that helps you replace fluids and minerals your body lost. It is sold at pharmacies and stores.  · Drink plenty of fluids, such as:  ? Water.  ? Ice chips.  ? Diluted fruit juice.  ? Low-calorie sports drinks.  ? Milk, if you want.  · Avoid drinking fluids that have a lot of sugar or caffeine in them.  · Eat bland, easy-to-digest foods in small amounts as you are able. These foods include:  ? Bananas.  ? Applesauce.  ? Rice.  ? Low-fat (lean) meats.  ? Toast.  ? Crackers.  · Avoid alcohol.  · Avoid spicy or fatty foods.    Medicines  · Take over-the-counter and prescription medicines only as told by your doctor.  · If you were prescribed an antibiotic medicine, take it as told by your doctor. Do not stop using the antibiotic even if you start to feel better.  General instructions    · Wash your hands often using soap and water. If soap and water are not available, use a hand . Others in your home should wash their hands as well. Hands should be washed:  ? After using the toilet or changing a diaper.  ? Before preparing, cooking, or serving food.  ? While caring for a sick person.  ? While visiting someone in a hospital.  · Drink enough fluid to keep your pee (urine) pale yellow.  · Rest at home while you get better.  · Watch your condition for any changes.  · Take a warm bath to help  with any burning or pain from having diarrhea.  · Keep all follow-up visits as told by your doctor. This is important.  Contact a doctor if:  · You have a fever.  · Your diarrhea gets worse.  · You have new symptoms.  · You cannot keep fluids down.  · You feel light-headed or dizzy.  · You have a headache.  · You have muscle cramps.  Get help right away if:  · You have chest pain.  · You feel very weak or you pass out (faint).  · You have bloody or black poop or poop that looks like tar.  · You have very bad pain, cramping, or bloating in your belly (abdomen).  · You have trouble breathing or you are breathing very quickly.  · Your heart is beating very quickly.  · Your skin feels cold and clammy.  · You feel confused.  · You have signs of losing too much water in your body, such as:  ? Dark pee, very little pee, or no pee.  ? Cracked lips.  ? Dry mouth.  ? Sunken eyes.  ? Sleepiness.  ? Weakness.  Summary  · Diarrhea is when you pass loose and watery poop (stool) often.  · Diarrhea can make you feel weak and cause you to lose water in your body (get dehydrated).  · Take an ORS (oral rehydration solution). This is a drink that is sold at pharmacies and stores.  · Eat bland, easy-to-digest foods in small amounts as you are able.  · Contact a doctor if your condition gets worse. Get help right away if you have signs that you have lost too much water in your body.  This information is not intended to replace advice given to you by your health care provider. Make sure you discuss any questions you have with your health care provider.  Document Released: 06/05/2009 Document Revised: 05/24/2019 Document Reviewed: 05/24/2019  [employer] Interactive Patient Education © 2020 [employer]

## 2020-03-05 LAB — C DIFF GDH STL QL: NEGATIVE

## 2020-03-05 RX ORDER — BUDESONIDE 3 MG/1
CAPSULE, COATED PELLETS ORAL
Qty: 126 CAPSULE | Refills: 0 | Status: SHIPPED | OUTPATIENT
Start: 2020-03-05 | End: 2022-03-22

## 2020-05-11 ENCOUNTER — TELEPHONE (OUTPATIENT)
Dept: FAMILY MEDICINE CLINIC | Facility: CLINIC | Age: 55
End: 2020-05-11

## 2020-05-11 NOTE — TELEPHONE ENCOUNTER
Please call patient and make sure she has splint in place and leave it on at all times including hygiene until she sees ortho-who and when is she seeing?

## 2020-05-26 RX ORDER — ATENOLOL 50 MG/1
TABLET ORAL
Qty: 90 TABLET | Refills: 1 | Status: SHIPPED | OUTPATIENT
Start: 2020-05-26 | End: 2020-12-10

## 2020-07-27 RX ORDER — ATORVASTATIN CALCIUM 20 MG/1
TABLET, FILM COATED ORAL
Qty: 90 TABLET | Refills: 3 | Status: SHIPPED | OUTPATIENT
Start: 2020-07-27 | End: 2020-09-03

## 2020-08-12 ENCOUNTER — OFFICE (AMBULATORY)
Dept: URBAN - METROPOLITAN AREA CLINIC 64 | Facility: CLINIC | Age: 55
End: 2020-08-12

## 2020-08-12 VITALS
SYSTOLIC BLOOD PRESSURE: 164 MMHG | HEIGHT: 64 IN | WEIGHT: 156 LBS | HEART RATE: 60 BPM | DIASTOLIC BLOOD PRESSURE: 113 MMHG

## 2020-08-12 DIAGNOSIS — R19.7 DIARRHEA, UNSPECIFIED: ICD-10-CM

## 2020-08-12 DIAGNOSIS — N25.9 DISORDER RESULTING FROM IMPAIRED RENAL TUBULAR FUNCTION, UNS: ICD-10-CM

## 2020-08-12 DIAGNOSIS — R10.30 LOWER ABDOMINAL PAIN, UNSPECIFIED: ICD-10-CM

## 2020-08-12 PROCEDURE — 99213 OFFICE O/P EST LOW 20 MIN: CPT | Performed by: NURSE PRACTITIONER

## 2020-08-12 RX ORDER — BUDESONIDE 3 MG/1
CAPSULE ORAL
Qty: 90 | Refills: 3 | Status: COMPLETED
Start: 2020-08-12 | End: 2022-03-28

## 2020-08-12 RX ORDER — DICYCLOMINE HYDROCHLORIDE 10 MG/1
20 CAPSULE ORAL
Qty: 60 | Refills: 6 | Status: COMPLETED
Start: 2020-08-12 | End: 2022-03-28

## 2020-08-31 ENCOUNTER — OFFICE VISIT (OUTPATIENT)
Dept: FAMILY MEDICINE CLINIC | Facility: CLINIC | Age: 55
End: 2020-08-31

## 2020-08-31 VITALS
DIASTOLIC BLOOD PRESSURE: 80 MMHG | HEIGHT: 64 IN | TEMPERATURE: 98.4 F | RESPIRATION RATE: 16 BRPM | BODY MASS INDEX: 26.67 KG/M2 | WEIGHT: 156.2 LBS | SYSTOLIC BLOOD PRESSURE: 128 MMHG | HEART RATE: 52 BPM | OXYGEN SATURATION: 96 %

## 2020-08-31 DIAGNOSIS — D64.9 ANEMIA, UNSPECIFIED TYPE: ICD-10-CM

## 2020-08-31 DIAGNOSIS — E55.9 VITAMIN D DEFICIENCY: ICD-10-CM

## 2020-08-31 DIAGNOSIS — Z12.2 ENCOUNTER FOR SCREENING FOR LUNG CANCER: ICD-10-CM

## 2020-08-31 DIAGNOSIS — E78.5 HYPERLIPIDEMIA, UNSPECIFIED HYPERLIPIDEMIA TYPE: ICD-10-CM

## 2020-08-31 DIAGNOSIS — E53.8 B12 DEFICIENCY: ICD-10-CM

## 2020-08-31 DIAGNOSIS — Z12.4 SCREENING FOR CERVICAL CANCER: ICD-10-CM

## 2020-08-31 DIAGNOSIS — R10.9 RIGHT FLANK PAIN: Primary | ICD-10-CM

## 2020-08-31 DIAGNOSIS — I10 ESSENTIAL HYPERTENSION: ICD-10-CM

## 2020-08-31 DIAGNOSIS — E03.9 HYPOTHYROIDISM, UNSPECIFIED TYPE: ICD-10-CM

## 2020-08-31 LAB
BILIRUB BLD-MCNC: NEGATIVE MG/DL
CLARITY, POC: CLEAR
COLOR UR: YELLOW
GLUCOSE UR STRIP-MCNC: NEGATIVE MG/DL
KETONES UR QL: NEGATIVE
LEUKOCYTE EST, POC: NEGATIVE
NITRITE UR-MCNC: NEGATIVE MG/ML
PH UR: 5.5 [PH] (ref 5–8)
PROT UR STRIP-MCNC: NEGATIVE MG/DL
RBC # UR STRIP: NEGATIVE /UL
SP GR UR: 1.01 (ref 1–1.03)
UROBILINOGEN UR QL: NORMAL

## 2020-08-31 PROCEDURE — 99214 OFFICE O/P EST MOD 30 MIN: CPT | Performed by: PREVENTIVE MEDICINE

## 2020-08-31 PROCEDURE — 81003 URINALYSIS AUTO W/O SCOPE: CPT | Performed by: PREVENTIVE MEDICINE

## 2020-08-31 RX ORDER — MULTIVIT WITH MINERALS/LUTEIN
1000 TABLET ORAL DAILY
COMMUNITY
End: 2022-03-22

## 2020-08-31 NOTE — PATIENT INSTRUCTIONS
Advise shinrix and flu shot at pharmacy.    Needs PAP at   [facility]     12 hour fast for labs.    Ibuprofen 600 up to 4X/day for back pain.  Give Back pain relief exercises with 10minutes ice 4/day.  Call 10 days to report progress.

## 2020-08-31 NOTE — PROGRESS NOTES
"Andre Carlos is a 55 y.o. female presents for   Chief Complaint   Patient presents with   • Back Pain       Health Maintenance Due   Topic Date Due   • ZOSTER VACCINE (1 of 2) 01/07/2013   • LUNG CANCER SCREENING  01/07/2018   • PAP SMEAR  07/26/2019   • INFLUENZA VACCINE  08/01/2020       R flank pain that she was sure was due to kidney infection/stone.  No change in urine or bowel movement.  No cough or fever.  Taking additional Ibuprofen this am-along with usual pain meds.  Not working due to fraccture L wrist resolving.  No activity to bother back.  Just awoke with pain 4 days ago and has bothered since.  Increase pain with movement.       Vitals:    08/31/20 1156 08/31/20 1201   BP: 143/88 128/80   BP Location: Right arm Left arm   Patient Position: Sitting Sitting   Cuff Size: Adult Adult   Pulse: 52 52   Resp: 16    Temp: 98.4 °F (36.9 °C)    TempSrc: Infrared    SpO2: 96%    Weight: 70.9 kg (156 lb 3.2 oz)    Height: 162.6 cm (64\")      Body mass index is 26.81 kg/m².    Current Outpatient Medications on File Prior to Visit   Medication Sig Dispense Refill   • atenolol (TENORMIN) 50 MG tablet TAKE ONE TABLET BY MOUTH EVERY DAY 90 tablet 1   • atorvastatin (LIPITOR) 20 MG tablet TAKE ONE TABLET BY MOUTH AT BEDTIME 90 tablet 3   • Budesonide (ENTOCORT EC) 3 MG 24 hr capsule Take 3 mg TID x 6 weeks 126 capsule 0   • Cholecalciferol (VITAMIN D) 2000 units capsule VITAMIN D 2000 UNIT CAPS     • dicyclomine (BENTYL) 10 MG capsule Take 1 capsule by mouth 4 (Four) Times a Day Before Meals & at Bedtime. 120 capsule 1   • famciclovir (FAMVIR) 500 MG tablet Take 500 mg by mouth Daily.     • hydrALAZINE (APRESOLINE) 50 MG tablet Take 1 tablet by mouth 3 (Three) Times a Day. One tablet twice daily (Patient taking differently: Take 50 mg by mouth 3 (Three) Times a Day. One tablet three times daily) 180 tablet 1   • HYDROcodone-acetaminophen (NORCO)  MG per tablet Take 1 tablet by mouth Every 6 " (Six) Hours.  0   • levothyroxine (SYNTHROID, LEVOTHROID) 25 MCG tablet Take 1 tablet by mouth daily on empty stomach for one hour 90 tablet 3   • tiZANidine (ZANAFLEX) 4 MG tablet Take 4 mg by mouth every night at bedtime.  5   • trifluridine (VIROPTIC) 1 % ophthalmic solution Administer 1 drop to both eyes 3 (Three) Times a Day.  0   • vitamin B-12 (CYANOCOBALAMIN) 1000 MCG tablet Take 2,500 mcg by mouth Daily.     • vitamin C (ASCORBIC ACID) 250 MG tablet Take 1,000 mg by mouth Daily.       No current facility-administered medications on file prior to visit.        The following portions of the patient's history were reviewed and updated as appropriate: allergies, current medications, past family history, past medical history, past social history, past surgical history and problem list.    Review of Systems   Constitutional: Negative.    HENT: Negative.  Negative for sinus pressure and sore throat.    Eyes: Negative.    Respiratory: Negative.  Negative for cough.    Cardiovascular: Negative.    Gastrointestinal: Negative.    Endocrine: Negative.    Genitourinary: Positive for flank pain.   Musculoskeletal: Positive for back pain.   Skin: Negative.    Allergic/Immunologic: Positive for environmental allergies.   Neurological: Negative.    Hematological: Negative.    Psychiatric/Behavioral: Negative.        Objective   Physical Exam   Constitutional: She is oriented to person, place, and time. She appears well-developed.   HENT:   Head: Normocephalic and atraumatic.   Eyes: Pupils are equal, round, and reactive to light. Conjunctivae and EOM are normal.   Neck: Normal range of motion.   Cardiovascular: Normal rate and regular rhythm.   Pulmonary/Chest: Effort normal.   Decreased breath sound bryon     Abdominal: Soft. Bowel sounds are normal. There is tenderness.   Musculoskeletal: Normal range of motion. She exhibits tenderness.   Pain To R oif L2-3 with spasm in muscle and pain with 50% RoM.    Toe heel walk and  Squat strong.  SLR and hip ext neg.  Knee and ankle reflexes 2+=bryon.  STS intact.   Lymphadenopathy:     She has no cervical adenopathy.   Neurological: She is alert and oriented to person, place, and time.   Skin: Skin is warm and dry. No rash noted.   Psychiatric: She has a normal mood and affect.   Nursing note and vitals reviewed.    PHQ-9 Total Score: 0    Assessment/Plan   Melodie was seen today for back pain.    Diagnoses and all orders for this visit:    Right flank pain  Comments:  Urine clear and admits feels like spasm but severe and on Pain meds already.  Normal BM, urinating and hysterectomy not for cancer.  No fever and no cough  Orders:  -     POCT urinalysis dipstick, automated    Encounter for screening for lung cancer  -     CT Chest Low Dose Wo; Future    Screening for cervical cancer  -     Ambulatory Referral to Gynecology    Hyperlipidemia, unspecified hyperlipidemia type  -     Lipid Panel    Anemia, unspecified type  -     CBC Auto Differential    Essential hypertension  Comments:  Fair control with pain today  Orders:  -     Comprehensive Metabolic Panel    Hypothyroidism, unspecified type  -     TSH    B12 deficiency  -     Vitamin B12    Vitamin D deficiency  -     Vitamin D 25 Hydroxy        Patient Instructions   Advise shinrix and flu shot at pharmacy.    Needs PAP at   [facility]     12 hour fast for labs.    Ibuprofen 600 up to 4X/day for back pain.  Give Back pain relief exercises with 10minutes ice 4/day.  Call 10 days to report progress.

## 2020-09-02 ENCOUNTER — CLINICAL SUPPORT (OUTPATIENT)
Dept: FAMILY MEDICINE CLINIC | Facility: CLINIC | Age: 55
End: 2020-09-02

## 2020-09-02 DIAGNOSIS — D64.9 ANEMIA, UNSPECIFIED TYPE: ICD-10-CM

## 2020-09-02 DIAGNOSIS — E78.5 HYPERLIPIDEMIA, UNSPECIFIED HYPERLIPIDEMIA TYPE: ICD-10-CM

## 2020-09-02 DIAGNOSIS — I10 ESSENTIAL HYPERTENSION: ICD-10-CM

## 2020-09-02 DIAGNOSIS — E55.9 VITAMIN D DEFICIENCY: ICD-10-CM

## 2020-09-02 DIAGNOSIS — E03.9 HYPOTHYROIDISM, UNSPECIFIED TYPE: ICD-10-CM

## 2020-09-02 LAB
25(OH)D3 SERPL-MCNC: 79.1 NG/ML (ref 30–100)
ALBUMIN SERPL-MCNC: 3.9 G/DL (ref 3.5–5.2)
ALBUMIN/GLOB SERPL: 1.3 G/DL
ALP SERPL-CCNC: 62 U/L (ref 39–117)
ALT SERPL W P-5'-P-CCNC: 19 U/L (ref 1–33)
ANION GAP SERPL CALCULATED.3IONS-SCNC: 9 MMOL/L (ref 5–15)
AST SERPL-CCNC: 16 U/L (ref 1–32)
BASOPHILS # BLD AUTO: 0.07 10*3/MM3 (ref 0–0.2)
BASOPHILS NFR BLD AUTO: 0.8 % (ref 0–1.5)
BILIRUB SERPL-MCNC: 0.3 MG/DL (ref 0–1.2)
BUN SERPL-MCNC: 15 MG/DL (ref 6–20)
BUN/CREAT SERPL: 21.7 (ref 7–25)
CALCIUM SPEC-SCNC: 9.4 MG/DL (ref 8.6–10.5)
CHLORIDE SERPL-SCNC: 101 MMOL/L (ref 98–107)
CHOLEST SERPL-MCNC: 180 MG/DL (ref 0–200)
CO2 SERPL-SCNC: 26 MMOL/L (ref 22–29)
CREAT SERPL-MCNC: 0.69 MG/DL (ref 0.57–1)
DEPRECATED RDW RBC AUTO: 46.8 FL (ref 37–54)
EOSINOPHIL # BLD AUTO: 0.23 10*3/MM3 (ref 0–0.4)
EOSINOPHIL NFR BLD AUTO: 2.6 % (ref 0.3–6.2)
ERYTHROCYTE [DISTWIDTH] IN BLOOD BY AUTOMATED COUNT: 13.1 % (ref 12.3–15.4)
GFR SERPL CREATININE-BSD FRML MDRD: 88 ML/MIN/1.73
GLOBULIN UR ELPH-MCNC: 3 GM/DL
GLUCOSE SERPL-MCNC: 82 MG/DL (ref 65–99)
HCT VFR BLD AUTO: 36.6 % (ref 34–46.6)
HDLC SERPL-MCNC: 75 MG/DL (ref 40–60)
HGB BLD-MCNC: 11.9 G/DL (ref 12–15.9)
IMM GRANULOCYTES # BLD AUTO: 0.02 10*3/MM3 (ref 0–0.05)
IMM GRANULOCYTES NFR BLD AUTO: 0.2 % (ref 0–0.5)
LDLC SERPL CALC-MCNC: 80 MG/DL (ref 0–100)
LDLC/HDLC SERPL: 1.07 {RATIO}
LYMPHOCYTES # BLD AUTO: 2.73 10*3/MM3 (ref 0.7–3.1)
LYMPHOCYTES NFR BLD AUTO: 31 % (ref 19.6–45.3)
MCH RBC QN AUTO: 31.5 PG (ref 26.6–33)
MCHC RBC AUTO-ENTMCNC: 32.5 G/DL (ref 31.5–35.7)
MCV RBC AUTO: 96.8 FL (ref 79–97)
MONOCYTES # BLD AUTO: 0.74 10*3/MM3 (ref 0.1–0.9)
MONOCYTES NFR BLD AUTO: 8.4 % (ref 5–12)
NEUTROPHILS NFR BLD AUTO: 5.02 10*3/MM3 (ref 1.7–7)
NEUTROPHILS NFR BLD AUTO: 57 % (ref 42.7–76)
NRBC BLD AUTO-RTO: 0 /100 WBC (ref 0–0.2)
PLATELET # BLD AUTO: 230 10*3/MM3 (ref 140–450)
PMV BLD AUTO: 12.7 FL (ref 6–12)
POTASSIUM SERPL-SCNC: 4.2 MMOL/L (ref 3.5–5.2)
PROT SERPL-MCNC: 6.9 G/DL (ref 6–8.5)
RBC # BLD AUTO: 3.78 10*6/MM3 (ref 3.77–5.28)
SODIUM SERPL-SCNC: 136 MMOL/L (ref 136–145)
TRIGL SERPL-MCNC: 123 MG/DL (ref 0–150)
TSH SERPL DL<=0.05 MIU/L-ACNC: 2.36 UIU/ML (ref 0.27–4.2)
VIT B12 BLD-MCNC: >2000 PG/ML (ref 211–946)
VLDLC SERPL-MCNC: 24.6 MG/DL (ref 5–40)
WBC # BLD AUTO: 8.81 10*3/MM3 (ref 3.4–10.8)

## 2020-09-02 PROCEDURE — 80053 COMPREHEN METABOLIC PANEL: CPT | Performed by: PREVENTIVE MEDICINE

## 2020-09-02 PROCEDURE — 36415 COLL VENOUS BLD VENIPUNCTURE: CPT | Performed by: PREVENTIVE MEDICINE

## 2020-09-02 PROCEDURE — 80061 LIPID PANEL: CPT | Performed by: PREVENTIVE MEDICINE

## 2020-09-02 PROCEDURE — 85025 COMPLETE CBC W/AUTO DIFF WBC: CPT | Performed by: PREVENTIVE MEDICINE

## 2020-09-02 PROCEDURE — 82306 VITAMIN D 25 HYDROXY: CPT | Performed by: PREVENTIVE MEDICINE

## 2020-09-02 PROCEDURE — 82607 VITAMIN B-12: CPT | Performed by: PREVENTIVE MEDICINE

## 2020-09-02 PROCEDURE — 84443 ASSAY THYROID STIM HORMONE: CPT | Performed by: PREVENTIVE MEDICINE

## 2020-09-02 NOTE — PROGRESS NOTES
Venipuncture Blood Specimen Collection  Venipuncture performed in left arm by Isis Franklin MD with good hemostasis. Patient tolerated the procedure well without complications.   09/03/20   MD Isis Norris MD

## 2020-09-03 DIAGNOSIS — E78.5 HYPERLIPIDEMIA, UNSPECIFIED HYPERLIPIDEMIA TYPE: Primary | ICD-10-CM

## 2020-09-03 RX ORDER — ATORVASTATIN CALCIUM 40 MG/1
40 TABLET, FILM COATED ORAL DAILY
Qty: 90 TABLET | Refills: 3
Start: 2020-09-03 | End: 2020-12-09

## 2020-09-23 ENCOUNTER — OFFICE (AMBULATORY)
Dept: URBAN - METROPOLITAN AREA CLINIC 64 | Facility: CLINIC | Age: 55
End: 2020-09-23

## 2020-09-23 VITALS
HEART RATE: 56 BPM | SYSTOLIC BLOOD PRESSURE: 137 MMHG | DIASTOLIC BLOOD PRESSURE: 81 MMHG | HEIGHT: 64 IN | WEIGHT: 159 LBS

## 2020-09-23 DIAGNOSIS — R10.30 LOWER ABDOMINAL PAIN, UNSPECIFIED: ICD-10-CM

## 2020-09-23 DIAGNOSIS — N25.9 DISORDER RESULTING FROM IMPAIRED RENAL TUBULAR FUNCTION, UNS: ICD-10-CM

## 2020-09-23 DIAGNOSIS — R19.7 DIARRHEA, UNSPECIFIED: ICD-10-CM

## 2020-09-23 PROCEDURE — 99213 OFFICE O/P EST LOW 20 MIN: CPT | Performed by: NURSE PRACTITIONER

## 2020-10-19 RX ORDER — LEVOTHYROXINE SODIUM 0.03 MG/1
TABLET ORAL
Qty: 90 TABLET | Refills: 3 | Status: SHIPPED | OUTPATIENT
Start: 2020-10-19 | End: 2021-10-20 | Stop reason: SDUPTHER

## 2020-12-08 ENCOUNTER — CLINICAL SUPPORT (OUTPATIENT)
Dept: FAMILY MEDICINE CLINIC | Facility: CLINIC | Age: 55
End: 2020-12-08

## 2020-12-08 DIAGNOSIS — E78.5 HYPERLIPIDEMIA, UNSPECIFIED HYPERLIPIDEMIA TYPE: ICD-10-CM

## 2020-12-08 PROCEDURE — 36415 COLL VENOUS BLD VENIPUNCTURE: CPT | Performed by: PREVENTIVE MEDICINE

## 2020-12-08 PROCEDURE — 80061 LIPID PANEL: CPT | Performed by: PREVENTIVE MEDICINE

## 2020-12-08 NOTE — PROGRESS NOTES
Venipuncture Blood Specimen Collection  Venipuncture performed in right arm by Isis Franklin MD with good hemostasis. Patient tolerated the procedure well without complications.   12/08/20   MD Isis Norris MD

## 2020-12-09 LAB
CHOLEST SERPL-MCNC: 186 MG/DL (ref 0–200)
HDLC SERPL-MCNC: 81 MG/DL (ref 40–60)
LDLC SERPL CALC-MCNC: 90 MG/DL (ref 0–100)
LDLC/HDLC SERPL: 1.09 {RATIO}
TRIGL SERPL-MCNC: 84 MG/DL (ref 0–150)
VLDLC SERPL-MCNC: 15 MG/DL (ref 5–40)

## 2020-12-09 RX ORDER — ATORVASTATIN CALCIUM 80 MG/1
80 TABLET, FILM COATED ORAL DAILY
Qty: 90 TABLET | Refills: 3
Start: 2020-12-09 | End: 2020-12-10 | Stop reason: SDUPTHER

## 2020-12-09 NOTE — PROGRESS NOTES
Let's increase Atorastatin to 80 mg at night as Bad cholesterol 90 and goal is below 70.  Continue to watch sat fats and walk.

## 2020-12-10 RX ORDER — ATORVASTATIN CALCIUM 80 MG/1
80 TABLET, FILM COATED ORAL DAILY
Qty: 90 TABLET | Refills: 3
Start: 2020-12-10 | End: 2020-12-15 | Stop reason: SDUPTHER

## 2020-12-10 RX ORDER — ATENOLOL 50 MG/1
TABLET ORAL
Qty: 90 TABLET | Refills: 1 | Status: SHIPPED | OUTPATIENT
Start: 2020-12-10 | End: 2021-01-11 | Stop reason: SDUPTHER

## 2020-12-15 RX ORDER — ATORVASTATIN CALCIUM 80 MG/1
80 TABLET, FILM COATED ORAL DAILY
Qty: 90 TABLET | Refills: 3
Start: 2020-12-15 | End: 2020-12-16 | Stop reason: SDUPTHER

## 2020-12-15 NOTE — TELEPHONE ENCOUNTER
"Caller: Melodie Carlos    Relationship: Self    Best call back number: [phone]    Medication needed:   Requested Prescriptions     Pending Prescriptions Disp Refills   • atorvastatin (Lipitor) 80 MG tablet 90 tablet 3     Sig: Take 1 tablet by mouth Daily.       When do you need the refill by: ASAP    What details did the patient provide when requesting the medication: Patient said recently increased dosage and is now out. Said she thought it was increased to \"90 or something\".    Does the patient have less than a 3 day supply:  [x] Yes  [] No    What is the patient's preferred pharmacy: [facility] - [facility] [address] [place] [address] B - [phone]  - [phone] FX         "

## 2020-12-16 RX ORDER — ATORVASTATIN CALCIUM 80 MG/1
80 TABLET, FILM COATED ORAL DAILY
Qty: 90 TABLET | Refills: 3 | Status: SHIPPED | OUTPATIENT
Start: 2020-12-16 | End: 2021-09-07

## 2020-12-16 NOTE — TELEPHONE ENCOUNTER
Caller: Melodie Carlos    Relationship: Self    Best call back number: [phone]    Medication needed:   Requested Prescriptions     Pending Prescriptions Disp Refills   • atorvastatin (Lipitor) 80 MG tablet 90 tablet 3     Sig: Take 1 tablet by mouth Daily.       When do you need the refill by: ASAP    What details did the patient provide when requesting the medication: PT STATES THIS MEDICATION HAS BEEN INCREASED BUT IS NOT SURE WHAT THE INCREASE SHOULD BE, PT ALSO STATES THE PHARMACY DOES NOT HAVE A REQUEST FOR REFIL. PT IS OUT OF MEDICATION     Does the patient have less than a 3 day supply:  [x] Yes  [] No    What is the patient's preferred pharmacy: [facility] - [facility] [address] [facility] [address]  [phone] [facility] [phone] FX

## 2021-01-11 RX ORDER — ATENOLOL 50 MG/1
50 TABLET ORAL DAILY
Qty: 90 TABLET | Refills: 1 | Status: SHIPPED | OUTPATIENT
Start: 2021-01-11 | End: 2021-12-23

## 2021-01-11 NOTE — TELEPHONE ENCOUNTER
Caller: Melodie Carlos    Relationship: Self    Best call back number: [phone]   Medication needed:   Requested Prescriptions     Pending Prescriptions Disp Refills   • atenolol (TENORMIN) 50 MG tablet 90 tablet 1     Sig: Take 1 tablet by mouth Daily.       When do you need the refill by: TODAY ASAP   What details did the patient provide when requesting the medication: OUT OF MEDICATION AND HER HEART RATE  AND ASKING IF IT CAN BE FILLED TODAY   Does the patient have less than a 3 day supply:  [x] Yes  [] No    What is the patient's preferred pharmacy: [facility] - [facility] [address] [facility] [address]  [phone] [facility] [phone] FX

## 2021-04-22 ENCOUNTER — TRANSCRIBE ORDERS (OUTPATIENT)
Dept: ADMINISTRATIVE | Facility: HOSPITAL | Age: 56
End: 2021-04-22

## 2021-04-22 ENCOUNTER — LAB (OUTPATIENT)
Dept: LAB | Facility: HOSPITAL | Age: 56
End: 2021-04-22

## 2021-04-22 DIAGNOSIS — G89.29 CHRONIC JOINT PAIN: Primary | ICD-10-CM

## 2021-04-22 DIAGNOSIS — G89.29 CHRONIC JOINT PAIN: ICD-10-CM

## 2021-04-22 DIAGNOSIS — M25.50 CHRONIC JOINT PAIN: Primary | ICD-10-CM

## 2021-04-22 DIAGNOSIS — M25.50 CHRONIC JOINT PAIN: ICD-10-CM

## 2021-04-22 LAB
CHROMATIN AB SERPL-ACNC: 10.7 IU/ML (ref 0–14)
ERYTHROCYTE [SEDIMENTATION RATE] IN BLOOD: 13 MM/HR (ref 0–30)

## 2021-04-22 PROCEDURE — 86431 RHEUMATOID FACTOR QUANT: CPT

## 2021-04-22 PROCEDURE — 86038 ANTINUCLEAR ANTIBODIES: CPT

## 2021-04-22 PROCEDURE — 36415 COLL VENOUS BLD VENIPUNCTURE: CPT

## 2021-04-22 PROCEDURE — 85652 RBC SED RATE AUTOMATED: CPT

## 2021-04-23 LAB — ANA SER QL: NEGATIVE

## 2021-06-21 ENCOUNTER — OFFICE VISIT (OUTPATIENT)
Dept: FAMILY MEDICINE CLINIC | Facility: CLINIC | Age: 56
End: 2021-06-21

## 2021-06-21 VITALS
HEIGHT: 64 IN | DIASTOLIC BLOOD PRESSURE: 98 MMHG | OXYGEN SATURATION: 94 % | WEIGHT: 163 LBS | BODY MASS INDEX: 27.83 KG/M2 | TEMPERATURE: 98 F | SYSTOLIC BLOOD PRESSURE: 191 MMHG | HEART RATE: 68 BPM

## 2021-06-21 DIAGNOSIS — J15.9 PNEUMONIA, BACTERIAL: ICD-10-CM

## 2021-06-21 DIAGNOSIS — E78.5 HYPERLIPIDEMIA, UNSPECIFIED HYPERLIPIDEMIA TYPE: ICD-10-CM

## 2021-06-21 DIAGNOSIS — Z12.31 ENCOUNTER FOR SCREENING MAMMOGRAM FOR MALIGNANT NEOPLASM OF BREAST: ICD-10-CM

## 2021-06-21 DIAGNOSIS — I63.9 CEREBROVASCULAR ACCIDENT (CVA), UNSPECIFIED MECHANISM (HCC): ICD-10-CM

## 2021-06-21 DIAGNOSIS — D64.9 ANEMIA, UNSPECIFIED TYPE: ICD-10-CM

## 2021-06-21 DIAGNOSIS — E03.9 HYPOTHYROIDISM, UNSPECIFIED TYPE: ICD-10-CM

## 2021-06-21 DIAGNOSIS — Z00.01 ENCOUNTER FOR ANNUAL GENERAL MEDICAL EXAMINATION WITH ABNORMAL FINDINGS IN ADULT: Primary | ICD-10-CM

## 2021-06-21 DIAGNOSIS — I10 ESSENTIAL HYPERTENSION: ICD-10-CM

## 2021-06-21 DIAGNOSIS — Z12.4 SCREENING FOR CERVICAL CANCER: ICD-10-CM

## 2021-06-21 DIAGNOSIS — Z80.1 FAMILY HISTORY OF MALIGNANT NEOPLASM OF TRACHEA, BRONCHUS AND LUNG: ICD-10-CM

## 2021-06-21 DIAGNOSIS — R31.9 HEMATURIA, UNSPECIFIED TYPE: ICD-10-CM

## 2021-06-21 PROBLEM — B02.33 HERPES ZOSTER KERATITIS OF LEFT EYE: Status: ACTIVE | Noted: 2019-11-25

## 2021-06-21 PROBLEM — B00.53 HERPES SIMPLEX CONJUNCTIVITIS: Status: ACTIVE | Noted: 2019-05-31

## 2021-06-21 PROBLEM — H10.30 ACUTE CONJUNCTIVITIS: Status: ACTIVE | Noted: 2020-01-22

## 2021-06-21 PROCEDURE — 99396 PREV VISIT EST AGE 40-64: CPT | Performed by: PREVENTIVE MEDICINE

## 2021-06-21 PROCEDURE — 99214 OFFICE O/P EST MOD 30 MIN: CPT | Performed by: PREVENTIVE MEDICINE

## 2021-06-21 RX ORDER — VALACYCLOVIR HYDROCHLORIDE 500 MG/1
500 TABLET, FILM COATED ORAL DAILY
COMMUNITY
End: 2022-03-22

## 2021-06-21 RX ORDER — AMOXICILLIN AND CLAVULANATE POTASSIUM 875; 125 MG/1; MG/1
1 TABLET, FILM COATED ORAL 2 TIMES DAILY
Qty: 20 TABLET | Refills: 0 | Status: SHIPPED | OUTPATIENT
Start: 2021-06-21 | End: 2021-08-31

## 2021-06-21 RX ORDER — HYDRALAZINE HYDROCHLORIDE 100 MG/1
100 TABLET, FILM COATED ORAL 3 TIMES DAILY
Qty: 90 TABLET | Refills: 1 | Status: SHIPPED | OUTPATIENT
Start: 2021-06-21 | End: 2021-09-07

## 2021-06-21 NOTE — PROGRESS NOTES
"Andre Carlos is a 56 y.o. female presents for   Chief Complaint   Patient presents with   • Annual Exam   • Cough     congestion   Patient presents today for her annual wellness exam and has been advised to wear sunscreen and a seatbelt.  She also has multiple health maintenance areas to recheck on finally has multiple chronic health conditions blood pressure is not stable we have increased her hydralazine today.  Finally patient has a new condition of cough which has been going on for a week she is coughing up copious yellow sputum and concern is that she has pneumonia.  She has not had any fever and has had her Covid vaccination.    Health Maintenance Due   Topic Date Due   • ZOSTER VACCINE (1 of 2) Never done   • LUNG CANCER SCREENING  Never done   • PAP SMEAR  07/26/2019   • ANNUAL PHYSICAL  09/18/2020   • MAMMOGRAM  10/11/2020       History of Present Illness     Vitals:    06/21/21 1511 06/21/21 1512   BP: (!) 207/122 (!) 191/98   BP Location: Left arm Left arm   Patient Position: Sitting Sitting   Cuff Size: Adult Adult   Pulse: 68    Temp: 98 °F (36.7 °C)    SpO2: 94%    Weight: 73.9 kg (163 lb)    Height: 162.6 cm (64.02\")      Body mass index is 27.96 kg/m².    Current Outpatient Medications on File Prior to Visit   Medication Sig Dispense Refill   • atenolol (TENORMIN) 50 MG tablet Take 1 tablet by mouth Daily. 90 tablet 1   • atorvastatin (Lipitor) 80 MG tablet Take 1 tablet by mouth Daily. 90 tablet 3   • Cholecalciferol (VITAMIN D) 2000 units capsule VITAMIN D 2000 UNIT CAPS     • HYDROcodone-acetaminophen (NORCO)  MG per tablet Take 1 tablet by mouth Every 6 (Six) Hours.  0   • levothyroxine (SYNTHROID, LEVOTHROID) 25 MCG tablet Take 1 tablet by mouth daily on empty stomach for one hour 90 tablet 3   • tiZANidine (ZANAFLEX) 4 MG tablet Take 4 mg by mouth every night at bedtime.  5   • trifluridine (VIROPTIC) 1 % ophthalmic solution Administer 1 drop to both eyes 3 (Three) Times " a Day.  0   • valACYclovir (VALTREX) 500 MG tablet Take 500 mg by mouth Daily.     • vitamin B-12 (CYANOCOBALAMIN) 1000 MCG tablet Take 2,500 mcg by mouth Daily.     • vitamin C (ASCORBIC ACID) 250 MG tablet Take 1,000 mg by mouth Daily.     • [DISCONTINUED] hydrALAZINE (APRESOLINE) 50 MG tablet Take 1 tablet by mouth 3 (Three) Times a Day. One tablet twice daily (Patient taking differently: Take 50 mg by mouth 3 (Three) Times a Day. One tablet three times daily) 180 tablet 1   • Budesonide (ENTOCORT EC) 3 MG 24 hr capsule Take 3 mg TID x 6 weeks 126 capsule 0   • dicyclomine (BENTYL) 10 MG capsule Take 1 capsule by mouth 4 (Four) Times a Day Before Meals & at Bedtime. 120 capsule 1   • famciclovir (FAMVIR) 500 MG tablet Take 500 mg by mouth Daily.       No current facility-administered medications on file prior to visit.       The following portions of the patient's history were reviewed and updated as appropriate: allergies, current medications, past family history, past medical history, past social history, past surgical history and problem list.    Review of Systems   Constitutional: Positive for fatigue.   HENT: Positive for congestion, sinus pressure and sore throat.    Respiratory: Positive for cough, shortness of breath and wheezing.    Neurological: Positive for headache.   Psychiatric/Behavioral: Positive for depressed mood.       Objective   Physical Exam  Vitals reviewed.   Constitutional:       General: She is not in acute distress.     Appearance: Normal appearance. She is well-developed. She is ill-appearing. She is not toxic-appearing.   HENT:      Head: Normocephalic and atraumatic.      Right Ear: Tympanic membrane, ear canal and external ear normal.      Left Ear: Tympanic membrane, ear canal and external ear normal.      Nose: Nose normal.      Mouth/Throat:      Mouth: Mucous membranes are moist.      Pharynx: Posterior oropharyngeal erythema present.   Eyes:      Extraocular Movements:  Extraocular movements intact.      Conjunctiva/sclera: Conjunctivae normal.      Pupils: Pupils are equal, round, and reactive to light.   Cardiovascular:      Rate and Rhythm: Normal rate and regular rhythm.      Heart sounds: Normal heart sounds.   Pulmonary:      Effort: Pulmonary effort is normal.      Breath sounds: Wheezing, rhonchi and rales present.      Comments: Breath sounds decreased bilaterally  Abdominal:      General: Bowel sounds are normal. There is no distension.      Palpations: Abdomen is soft. There is no mass.      Tenderness: There is no abdominal tenderness.   Musculoskeletal:         General: Normal range of motion.      Cervical back: Neck supple.   Skin:     General: Skin is warm.   Neurological:      General: No focal deficit present.      Mental Status: She is alert and oriented to person, place, and time.   Psychiatric:         Mood and Affect: Mood normal.         Behavior: Behavior normal.       PHQ-9 Total Score:      Assessment/Plan   Diagnoses and all orders for this visit:    1. Encounter for annual general medical examination with abnormal findings in adult (Primary)  Comments:  Patient has been advised to wear sunscreen and seatbelt  Orders:  -     CT Chest Without Contrast Diagnostic; Future    2. Screening for cervical cancer  Comments:  We will get results of Pap smear from her gynecologist she states that has been done recently    3. Encounter for screening mammogram for malignant neoplasm of breast  -     Mammo Screening Digital Tomosynthesis Bilateral With CAD; Future    4. Pneumonia, bacterial  Comments:  Patient has been coughing up yellow sputum and has been having some problems with shortness of breath she does have history of cigarette smoking and concern is   Orders:  -     CT Chest Without Contrast Diagnostic; Future  -     amoxicillin-clavulanate (AUGMENTIN) 875-125 MG per tablet; Take 1 tablet by mouth 2 (Two) Times a Day.  Dispense: 20 tablet; Refill: 0    5.  Hyperlipidemia, unspecified hyperlipidemia type  Comments:  Trying to eat less saturated fats  Orders:  -     Lipid Panel    6. Essential hypertension  Comments:  Blood pressures not under good control we will increase her hydralazine 200 mg 3 times a day and she will call back next week with 10 blood pressures increased   Orders:  -     CBC Auto Differential  -     Comprehensive Metabolic Panel    7. Hypothyroidism, unspecified type  Comments:  No dry skin or increased hair loss or palpitations.  Orders:  -     TSH    8. Anemia, unspecified type    9. Cerebrovascular accident (CVA), unspecified mechanism (CMS/HCC)  Comments:  No weakness trouble swallowing or change in sensation    10. Hematuria, unspecified type  Comments:  Patient has not noticed any blood in her urine  Orders:  -     POC Urinalysis Dipstick, Automated    11. Family history of malignant neoplasm of trachea, bronchus and lung  Comments:  CT is pending due to pneumonia.  Patient is hoarse and we will consider referral to ear nose and throat.  Orders:  -     CT Chest Without Contrast Diagnostic; Future    Other orders  -     hydrALAZINE (APRESOLINE) 100 MG tablet; Take 1 tablet by mouth 3 (Three) Times a Day.  Dispense: 90 tablet; Refill: 1        Patient Instructions     Health Maintenance Due   Topic Date Due   • ZOSTER VACCINE (1 of 2) Never done   • LUNG CANCER SCREENING  Never done   • PAP SMEAR  07/26/2019   • ANNUAL PHYSICAL  09/18/2020   • MAMMOGRAM  10/11/2020     12 hour fast for labs.    Increase 100 three times daily Hydralazine.Check blood pressure cuff for accuracy and send 10 blood pressures over 2 weeks.  Watch sodium, alcohol and weight

## 2021-06-21 NOTE — PATIENT INSTRUCTIONS
Health Maintenance Due   Topic Date Due   • ZOSTER VACCINE (1 of 2) Never done   • LUNG CANCER SCREENING  Never done   • PAP SMEAR  07/26/2019   • ANNUAL PHYSICAL  09/18/2020   • MAMMOGRAM  10/11/2020     12 hour fast for labs.    Increase 100 three times daily Hydralazine.Check blood pressure cuff for accuracy and send 10 blood pressures over 2 weeks.  Watch sodium, alcohol and weight

## 2021-06-22 ENCOUNTER — CLINICAL SUPPORT (OUTPATIENT)
Dept: FAMILY MEDICINE CLINIC | Facility: CLINIC | Age: 56
End: 2021-06-22

## 2021-06-22 DIAGNOSIS — D64.9 ANEMIA, UNSPECIFIED TYPE: ICD-10-CM

## 2021-06-22 LAB
ALBUMIN SERPL-MCNC: 4.4 G/DL (ref 3.5–5.2)
ALBUMIN/GLOB SERPL: 1.5 G/DL
ALP SERPL-CCNC: 97 U/L (ref 39–117)
ALT SERPL W P-5'-P-CCNC: 31 U/L (ref 1–33)
ANION GAP SERPL CALCULATED.3IONS-SCNC: 11.4 MMOL/L (ref 5–15)
AST SERPL-CCNC: 34 U/L (ref 1–32)
BASOPHILS # BLD AUTO: 0.04 10*3/MM3 (ref 0–0.2)
BASOPHILS NFR BLD AUTO: 0.6 % (ref 0–1.5)
BILIRUB SERPL-MCNC: 0.2 MG/DL (ref 0–1.2)
BUN SERPL-MCNC: 10 MG/DL (ref 6–20)
BUN/CREAT SERPL: 13.7 (ref 7–25)
CALCIUM SPEC-SCNC: 9.3 MG/DL (ref 8.6–10.5)
CHLORIDE SERPL-SCNC: 100 MMOL/L (ref 98–107)
CHOLEST SERPL-MCNC: 136 MG/DL (ref 0–200)
CO2 SERPL-SCNC: 23.6 MMOL/L (ref 22–29)
CREAT SERPL-MCNC: 0.73 MG/DL (ref 0.57–1)
DEPRECATED RDW RBC AUTO: 42.9 FL (ref 37–54)
EOSINOPHIL # BLD AUTO: 0.29 10*3/MM3 (ref 0–0.4)
EOSINOPHIL NFR BLD AUTO: 4.3 % (ref 0.3–6.2)
ERYTHROCYTE [DISTWIDTH] IN BLOOD BY AUTOMATED COUNT: 12.8 % (ref 12.3–15.4)
GFR SERPL CREATININE-BSD FRML MDRD: 82 ML/MIN/1.73
GLOBULIN UR ELPH-MCNC: 2.9 GM/DL
GLUCOSE SERPL-MCNC: 102 MG/DL (ref 65–99)
HCT VFR BLD AUTO: 37.3 % (ref 34–46.6)
HDLC SERPL-MCNC: 61 MG/DL (ref 40–60)
HGB BLD-MCNC: 12.8 G/DL (ref 12–15.9)
IMM GRANULOCYTES # BLD AUTO: 0.03 10*3/MM3 (ref 0–0.05)
IMM GRANULOCYTES NFR BLD AUTO: 0.4 % (ref 0–0.5)
LDLC SERPL CALC-MCNC: 58 MG/DL (ref 0–100)
LDLC/HDLC SERPL: 0.93 {RATIO}
LYMPHOCYTES # BLD AUTO: 1.5 10*3/MM3 (ref 0.7–3.1)
LYMPHOCYTES NFR BLD AUTO: 22 % (ref 19.6–45.3)
MCH RBC QN AUTO: 31.9 PG (ref 26.6–33)
MCHC RBC AUTO-ENTMCNC: 34.3 G/DL (ref 31.5–35.7)
MCV RBC AUTO: 93 FL (ref 79–97)
MONOCYTES # BLD AUTO: 0.59 10*3/MM3 (ref 0.1–0.9)
MONOCYTES NFR BLD AUTO: 8.7 % (ref 5–12)
NEUTROPHILS NFR BLD AUTO: 4.36 10*3/MM3 (ref 1.7–7)
NEUTROPHILS NFR BLD AUTO: 64 % (ref 42.7–76)
NRBC BLD AUTO-RTO: 0 /100 WBC (ref 0–0.2)
PLATELET # BLD AUTO: 249 10*3/MM3 (ref 140–450)
PMV BLD AUTO: 12.8 FL (ref 6–12)
POTASSIUM SERPL-SCNC: 4.1 MMOL/L (ref 3.5–5.2)
PROT SERPL-MCNC: 7.3 G/DL (ref 6–8.5)
RBC # BLD AUTO: 4.01 10*6/MM3 (ref 3.77–5.28)
SODIUM SERPL-SCNC: 135 MMOL/L (ref 136–145)
TRIGL SERPL-MCNC: 91 MG/DL (ref 0–150)
TSH SERPL DL<=0.05 MIU/L-ACNC: 2.46 UIU/ML (ref 0.27–4.2)
VLDLC SERPL-MCNC: 17 MG/DL (ref 5–40)
WBC # BLD AUTO: 6.81 10*3/MM3 (ref 3.4–10.8)

## 2021-06-22 PROCEDURE — 84443 ASSAY THYROID STIM HORMONE: CPT | Performed by: PREVENTIVE MEDICINE

## 2021-06-22 PROCEDURE — 80053 COMPREHEN METABOLIC PANEL: CPT | Performed by: PREVENTIVE MEDICINE

## 2021-06-22 PROCEDURE — 80061 LIPID PANEL: CPT | Performed by: PREVENTIVE MEDICINE

## 2021-06-22 PROCEDURE — 85025 COMPLETE CBC W/AUTO DIFF WBC: CPT | Performed by: PREVENTIVE MEDICINE

## 2021-06-22 PROCEDURE — 36415 COLL VENOUS BLD VENIPUNCTURE: CPT | Performed by: PREVENTIVE MEDICINE

## 2021-06-22 NOTE — PROGRESS NOTES
Venipuncture Blood Specimen Collection  Venipuncture performed in left  arm by Reshma Gerard MA with good hemostasis. Patient tolerated the procedure well without complications.   06/22/21   VENICE Guzmán MD

## 2021-06-23 ENCOUNTER — TELEPHONE (OUTPATIENT)
Dept: FAMILY MEDICINE CLINIC | Facility: CLINIC | Age: 56
End: 2021-06-23

## 2021-06-23 NOTE — TELEPHONE ENCOUNTER
Caller: Melodie Carlos    Relationship: Self    Best call back number: [phone]    Caller requesting test results: PATIENT    What test was performed: BLOOD WORK    When was the test performed: 6/22/21    Additional notes: PATIENT IS SEEKING RESULTS OF BLOOD WORK.

## 2021-06-24 ENCOUNTER — OFFICE VISIT (OUTPATIENT)
Dept: FAMILY MEDICINE CLINIC | Facility: CLINIC | Age: 56
End: 2021-06-24

## 2021-06-24 ENCOUNTER — TELEPHONE (OUTPATIENT)
Dept: FAMILY MEDICINE CLINIC | Facility: CLINIC | Age: 56
End: 2021-06-24

## 2021-06-24 VITALS
WEIGHT: 163 LBS | TEMPERATURE: 99.1 F | HEIGHT: 64 IN | BODY MASS INDEX: 27.83 KG/M2 | DIASTOLIC BLOOD PRESSURE: 96 MMHG | HEART RATE: 72 BPM | OXYGEN SATURATION: 94 % | SYSTOLIC BLOOD PRESSURE: 165 MMHG

## 2021-06-24 DIAGNOSIS — M79.672 LEFT FOOT PAIN: Primary | ICD-10-CM

## 2021-06-24 PROCEDURE — 99213 OFFICE O/P EST LOW 20 MIN: CPT | Performed by: PREVENTIVE MEDICINE

## 2021-06-24 RX ORDER — VENLAFAXINE HYDROCHLORIDE 37.5 MG/1
CAPSULE, EXTENDED RELEASE ORAL
COMMUNITY
Start: 2021-05-27 | End: 2022-08-15

## 2021-06-24 NOTE — PATIENT INSTRUCTIONS
Staff to call [facility] and make sure have Xray tech.    Stay off foot as much as possible and wear stiff souled shoe.  Ie and elevate for comfort.  Followup with Podiatry    Check blood pressure cuff for accuracy and send 10 blood pressures over 2 weeks.  Watch sodium, alcohol and weight

## 2021-06-24 NOTE — PROGRESS NOTES
"Andre Carlos is a 56 y.o. female presents for   Chief Complaint   Patient presents with   • Foot Pain     left foot   56-year-old white female without known osteoporosis slipped and twisted her left foot a couple of weeks ago no pain into the foot till a couple of days ago and yesterday it started swelling and pain about the proximal portion of the fifth metatarsal.  Patient has never injured the foot before but does have some numbness across the tops of the feet but toe motor and sensory are intact there is no ankle pain.    Health Maintenance Due   Topic Date Due   • ZOSTER VACCINE (1 of 2) Never done   • LUNG CANCER SCREENING  Never done   • PAP SMEAR  07/26/2019   • MAMMOGRAM  10/11/2020       History of Present Illness     Vitals:    06/24/21 1128 06/24/21 1132   BP: 159/93 165/96   BP Location: Left arm Right arm   Patient Position: Sitting Sitting   Cuff Size: Adult Adult   Pulse: 72    Temp: 99.1 °F (37.3 °C)    SpO2: 94%    Weight: 73.9 kg (163 lb)    Height: 162.6 cm (64.02\")      Body mass index is 27.96 kg/m².    Current Outpatient Medications on File Prior to Visit   Medication Sig Dispense Refill   • amoxicillin-clavulanate (AUGMENTIN) 875-125 MG per tablet Take 1 tablet by mouth 2 (Two) Times a Day. 20 tablet 0   • atenolol (TENORMIN) 50 MG tablet Take 1 tablet by mouth Daily. 90 tablet 1   • atorvastatin (Lipitor) 80 MG tablet Take 1 tablet by mouth Daily. 90 tablet 3   • Budesonide (ENTOCORT EC) 3 MG 24 hr capsule Take 3 mg TID x 6 weeks 126 capsule 0   • Cholecalciferol (VITAMIN D) 2000 units capsule VITAMIN D 2000 UNIT CAPS     • dicyclomine (BENTYL) 10 MG capsule Take 1 capsule by mouth 4 (Four) Times a Day Before Meals & at Bedtime. 120 capsule 1   • hydrALAZINE (APRESOLINE) 100 MG tablet Take 1 tablet by mouth 3 (Three) Times a Day. 90 tablet 1   • HYDROcodone-acetaminophen (NORCO)  MG per tablet Take 1 tablet by mouth Every 6 (Six) Hours.  0   • levothyroxine " (SYNTHROID, LEVOTHROID) 25 MCG tablet Take 1 tablet by mouth daily on empty stomach for one hour 90 tablet 3   • tiZANidine (ZANAFLEX) 4 MG tablet Take 4 mg by mouth every night at bedtime.  5   • trifluridine (VIROPTIC) 1 % ophthalmic solution Administer 1 drop to both eyes 3 (Three) Times a Day.  0   • valACYclovir (VALTREX) 500 MG tablet Take 500 mg by mouth Daily.     • vitamin B-12 (CYANOCOBALAMIN) 1000 MCG tablet Take 2,500 mcg by mouth Daily.     • vitamin C (ASCORBIC ACID) 250 MG tablet Take 1,000 mg by mouth Daily.     • famciclovir (FAMVIR) 500 MG tablet Take 500 mg by mouth Daily.     • venlafaxine XR (EFFEXOR-XR) 37.5 MG 24 hr capsule TAKE ONE CAPSULE BY MOUTH EVERY DAY (TAKE FOR FOURTEEN DAYS AND THEN MAY INCREASE TO 75 MG EVERY DAY)       No current facility-administered medications on file prior to visit.       The following portions of the patient's history were reviewed and updated as appropriate: allergies, current medications, past family history, past medical history, past social history, past surgical history and problem list.    Review of Systems   Musculoskeletal: Positive for arthralgias and gait problem.       Objective   Physical Exam  Musculoskeletal:         General: Swelling and tenderness present. No deformity.      Comments: Mild swelling and tenderness along the base of the fifth metatarsal plantar and laterally.  Motor neurovascularly the toes were equal and intact.  Full range of motion of both ankles and right foot.       PHQ-9 Total Score:      Assessment/Plan   Diagnoses and all orders for this visit:    1. Left foot pain (Primary)  Comments:  Slipped twisting injury a couple of weeks ago no pain in the foot until couple of days ago.  No prior injury.  Mild swelling no erythema.  Orders:  -     Ambulatory Referral to Podiatry  -     XR Foot 3+ View Left (In Office)        Patient Instructions   Staff to call [facility] and make sure have Xray tech.    Stay off foot as much as possible  and wear stiff souled shoe.  Ie and elevate for comfort.  Followup with Podiatry    Check blood pressure cuff for accuracy and send 10 blood pressures over 2 weeks.  Watch sodium, alcohol and weight

## 2021-06-24 NOTE — TELEPHONE ENCOUNTER
----- Message from Isis Franklin MD sent at 6/24/2021  9:17 AM EDT -----  Glucose 102 so watch carbs and walk.  Rest looks good

## 2021-06-25 ENCOUNTER — TELEPHONE (OUTPATIENT)
Dept: FAMILY MEDICINE CLINIC | Facility: CLINIC | Age: 56
End: 2021-06-25

## 2021-06-25 NOTE — TELEPHONE ENCOUNTER
Please find out where she had mammogram 3/2021 so we can get in her chart.  Ask her what she wants to do with Venlaxafine

## 2021-06-25 NOTE — TELEPHONE ENCOUNTER
Caller: Melodie Carlos    Relationship: Self    Best call back number:   OK TO LEAVE A MESSAGE    What is the best time to reach you: ANYTIME    Who are you requesting to speak with (clinical staff, provider,  specific staff member):     Do you know the name of the person who called:     What was the call regarding: PATIENT IS CALLING IN STATING THAT SHE WAS IN THE OFFICE YESTERDAY TO HAVE A XRAY OF HER FOOT.  SHE HAS RECEIVED A CALL FROM [facility] SAYING THAT SHE NEEDS TO HAVE A MAMMOGRAM.  PATIENT STATES THAT SHE HAD A MAMMOGRAM IN MARCH OF 2021 AND DOES NOT NEED ONE.  SHE WANTS TO KNOW IF DR WELLER IS AWARE OF THIS. PATIENT IS ALSO WANTING TO DISCUSS DENALAFAXINE 37.5MG AND DENALAFAXINE 75MG (HOT FLASH MEDICATIONS).    Do you require a callback: YES

## 2021-06-25 NOTE — TELEPHONE ENCOUNTER
Patient had mammo at [facility], will get results and she just wanted you to know that her GYN has her on both 37.5 and 75 mg of Venlafaxine for hot flashes.

## 2021-06-28 ENCOUNTER — HOSPITAL ENCOUNTER (OUTPATIENT)
Dept: CT IMAGING | Facility: HOSPITAL | Age: 56
Discharge: HOME | End: 2021-06-28
Admitting: PREVENTIVE MEDICINE

## 2021-06-28 ENCOUNTER — TELEPHONE (OUTPATIENT)
Dept: FAMILY MEDICINE CLINIC | Facility: CLINIC | Age: 56
End: 2021-06-28

## 2021-06-28 DIAGNOSIS — Z00.01 ENCOUNTER FOR ANNUAL GENERAL MEDICAL EXAMINATION WITH ABNORMAL FINDINGS IN ADULT: ICD-10-CM

## 2021-06-28 DIAGNOSIS — J15.9 PNEUMONIA, BACTERIAL: ICD-10-CM

## 2021-06-28 DIAGNOSIS — Z80.1 FAMILY HISTORY OF MALIGNANT NEOPLASM OF TRACHEA, BRONCHUS AND LUNG: ICD-10-CM

## 2021-06-28 PROCEDURE — 71250 CT THORAX DX C-: CPT

## 2021-06-28 NOTE — TELEPHONE ENCOUNTER
----- Message from Isis Franklin MD sent at 6/28/2021  1:05 PM EDT -----  Xray shows no fracture-would advise follow up podiatrist if still sore.

## 2021-06-28 NOTE — TELEPHONE ENCOUNTER
Patient states that she stopped smoking 8 years ago.  ..      ----- Message from Isis Franklin MD sent at 6/28/2021 10:04 AM EDT -----  Emphasema but no pneumonia.  Small stable since 2012 Left Adrenal nodule-anything we can do to help her stop smoking?

## 2021-06-28 NOTE — PROGRESS NOTES
Emphasema but no pneumonia.  Small stable since 2012 Left Adrenal nodule-anything we can do to help her stop smoking?

## 2021-07-01 ENCOUNTER — TELEPHONE (OUTPATIENT)
Dept: FAMILY MEDICINE CLINIC | Facility: CLINIC | Age: 56
End: 2021-07-01

## 2021-07-01 NOTE — TELEPHONE ENCOUNTER
HUB TO READ    ----- Message from Isis Franklin MD sent at 6/30/2021  4:00 PM EDT -----  Mammogram showed no abnormalities-repeat one year

## 2021-08-02 ENCOUNTER — OFFICE VISIT (OUTPATIENT)
Dept: FAMILY MEDICINE CLINIC | Facility: CLINIC | Age: 56
End: 2021-08-02

## 2021-08-02 VITALS
TEMPERATURE: 98.2 F | DIASTOLIC BLOOD PRESSURE: 86 MMHG | HEIGHT: 64 IN | OXYGEN SATURATION: 97 % | BODY MASS INDEX: 28.68 KG/M2 | WEIGHT: 168 LBS | HEART RATE: 56 BPM | SYSTOLIC BLOOD PRESSURE: 146 MMHG

## 2021-08-02 DIAGNOSIS — M54.50 ACUTE RIGHT-SIDED LOW BACK PAIN WITHOUT SCIATICA: Primary | ICD-10-CM

## 2021-08-02 DIAGNOSIS — R60.0 EDEMA OF BOTH LOWER LEGS: ICD-10-CM

## 2021-08-02 LAB
ALBUMIN SERPL-MCNC: 4.6 G/DL (ref 3.5–5.2)
ALBUMIN/GLOB SERPL: 1.8 G/DL
ALP SERPL-CCNC: 78 U/L (ref 39–117)
ALT SERPL W P-5'-P-CCNC: 21 U/L (ref 1–33)
ANION GAP SERPL CALCULATED.3IONS-SCNC: 9.2 MMOL/L (ref 5–15)
AST SERPL-CCNC: 22 U/L (ref 1–32)
BASOPHILS # BLD AUTO: 0.08 10*3/MM3 (ref 0–0.2)
BASOPHILS NFR BLD AUTO: 1 % (ref 0–1.5)
BILIRUB BLD-MCNC: NEGATIVE MG/DL
BILIRUB SERPL-MCNC: 0.3 MG/DL (ref 0–1.2)
BUN SERPL-MCNC: 17 MG/DL (ref 6–20)
BUN/CREAT SERPL: 22.4 (ref 7–25)
CALCIUM SPEC-SCNC: 9.5 MG/DL (ref 8.6–10.5)
CHLORIDE SERPL-SCNC: 97 MMOL/L (ref 98–107)
CLARITY, POC: CLEAR
CO2 SERPL-SCNC: 26.8 MMOL/L (ref 22–29)
COLOR UR: YELLOW
CREAT SERPL-MCNC: 0.76 MG/DL (ref 0.57–1)
DEPRECATED RDW RBC AUTO: 44.3 FL (ref 37–54)
EOSINOPHIL # BLD AUTO: 0.21 10*3/MM3 (ref 0–0.4)
EOSINOPHIL NFR BLD AUTO: 2.6 % (ref 0.3–6.2)
ERYTHROCYTE [DISTWIDTH] IN BLOOD BY AUTOMATED COUNT: 12.7 % (ref 12.3–15.4)
GFR SERPL CREATININE-BSD FRML MDRD: 78 ML/MIN/1.73
GLOBULIN UR ELPH-MCNC: 2.6 GM/DL
GLUCOSE SERPL-MCNC: 82 MG/DL (ref 65–99)
GLUCOSE UR STRIP-MCNC: NEGATIVE MG/DL
HCT VFR BLD AUTO: 33.2 % (ref 34–46.6)
HGB BLD-MCNC: 11 G/DL (ref 12–15.9)
IMM GRANULOCYTES # BLD AUTO: 0.02 10*3/MM3 (ref 0–0.05)
IMM GRANULOCYTES NFR BLD AUTO: 0.3 % (ref 0–0.5)
KETONES UR QL: NEGATIVE
LEUKOCYTE EST, POC: NEGATIVE
LYMPHOCYTES # BLD AUTO: 2.26 10*3/MM3 (ref 0.7–3.1)
LYMPHOCYTES NFR BLD AUTO: 28.3 % (ref 19.6–45.3)
MCH RBC QN AUTO: 31.6 PG (ref 26.6–33)
MCHC RBC AUTO-ENTMCNC: 33.1 G/DL (ref 31.5–35.7)
MCV RBC AUTO: 95.4 FL (ref 79–97)
MONOCYTES # BLD AUTO: 0.6 10*3/MM3 (ref 0.1–0.9)
MONOCYTES NFR BLD AUTO: 7.5 % (ref 5–12)
NEUTROPHILS NFR BLD AUTO: 4.81 10*3/MM3 (ref 1.7–7)
NEUTROPHILS NFR BLD AUTO: 60.3 % (ref 42.7–76)
NITRITE UR-MCNC: NEGATIVE MG/ML
NRBC BLD AUTO-RTO: 0 /100 WBC (ref 0–0.2)
PH UR: 5 [PH] (ref 5–8)
PLATELET # BLD AUTO: 258 10*3/MM3 (ref 140–450)
PMV BLD AUTO: 12.9 FL (ref 6–12)
POTASSIUM SERPL-SCNC: 4.3 MMOL/L (ref 3.5–5.2)
PROT SERPL-MCNC: 7.2 G/DL (ref 6–8.5)
PROT UR STRIP-MCNC: NEGATIVE MG/DL
RBC # BLD AUTO: 3.48 10*6/MM3 (ref 3.77–5.28)
RBC # UR STRIP: NEGATIVE /UL
SODIUM SERPL-SCNC: 133 MMOL/L (ref 136–145)
SP GR UR: 1.01 (ref 1–1.03)
UROBILINOGEN UR QL: NORMAL
WBC # BLD AUTO: 7.98 10*3/MM3 (ref 3.4–10.8)

## 2021-08-02 PROCEDURE — 85025 COMPLETE CBC W/AUTO DIFF WBC: CPT | Performed by: NURSE PRACTITIONER

## 2021-08-02 PROCEDURE — 99214 OFFICE O/P EST MOD 30 MIN: CPT | Performed by: NURSE PRACTITIONER

## 2021-08-02 PROCEDURE — 81003 URINALYSIS AUTO W/O SCOPE: CPT | Performed by: NURSE PRACTITIONER

## 2021-08-02 PROCEDURE — 80053 COMPREHEN METABOLIC PANEL: CPT | Performed by: NURSE PRACTITIONER

## 2021-08-02 NOTE — PATIENT INSTRUCTIONS
"[email or web address]\">   Chronic Back Pain  When back pain lasts longer than 3 months, it is called chronic back pain. The cause of your back pain may not be known. Some common causes include:  · Wear and tear (degenerative disease) of the bones, ligaments, or disks in your back.  · Inflammation and stiffness in your back (arthritis).  People who have chronic back pain often go through certain periods in which the pain is more intense (flare-ups). Many people can learn to manage the pain with home care.  Follow these instructions at home:  Pay attention to any changes in your symptoms. Take these actions to help with your pain:  Managing pain and stiffness         · If directed, apply ice to the painful area. Your health care provider may recommend applying ice during the first 24-48 hours after a flare-up begins. To do this:  ? Put ice in a plastic bag.  ? Place a towel between your skin and the bag.  ? Leave the ice on for 20 minutes, 2-3 times per day.  · If directed, apply heat to the affected area as often as told by your health care provider. Use the heat source that your health care provider recommends, such as a moist heat pack or a heating pad.  ? Place a towel between your skin and the heat source.  ? Leave the heat on for 20-30 minutes.  ? Remove the heat if your skin turns bright red. This is especially important if you are unable to feel pain, heat, or cold. You may have a greater risk of getting burned.  · Try soaking in a warm tub.  Activity    · Avoid bending and other activities that make the problem worse.  · Maintain a proper position when standing or sitting:  ? When standing, keep your upper back and neck straight, with your shoulders pulled back. Avoid slouching.  ? When sitting, keep your back straight and relax your shoulders. Do not round your shoulders or pull them backward.  · Do not sit or  one place for long periods of time.  · Take brief periods of rest " throughout the day. This will reduce your pain. Resting in a lying or standing position is usually better than sitting to rest.  · When you are resting for longer periods, mix in some mild activity or stretching between periods of rest. This will help to prevent stiffness and pain.  · Get regular exercise. Ask your health care provider what activities are safe for you.  · Do not lift anything that is heavier than 10 lb (4.5 kg), or the limit that you are told, until your health care provider says that it is safe. Always use proper lifting technique, which includes:  ? Bending your knees.  ? Keeping the load close to your body.  ? Avoiding twisting.  · Sleep on a firm mattress in a comfortable position. Try lying on your side with your knees slightly bent. If you lie on your back, put a pillow under your knees.  Medicines  · Treatment may include medicines for pain and inflammation taken by mouth or applied to the skin, prescription pain medicine, or muscle relaxants. Take over-the-counter and prescription medicines only as told by your health care provider.  · Ask your health care provider if the medicine prescribed to you:  ? Requires you to avoid driving or using machinery.  ? Can cause constipation. You may need to take these actions to prevent or treat constipation:  § Drink enough fluid to keep your urine pale yellow.  § Take over-the-counter or prescription medicines.  § Eat foods that are high in fiber, such as beans, whole grains, and fresh fruits and vegetables.  § Limit foods that are high in fat and processed sugars, such as fried or sweet foods.  General instructions  · Do not use any products that contain nicotine or tobacco, such as cigarettes, e-cigarettes, and chewing tobacco. If you need help quitting, ask your health care provider.  · Keep all follow-up visits as told by your health care provider. This is important.  Contact a health care provider if:  · You have pain that is not relieved with rest  or medicine.  · Your pain gets worse, or you have new pain.  · You have a high fever.  · You have rapid weight loss.  · You have trouble doing your normal activities.  Get help right away if:  · You have weakness or numbness in one or both of your legs or feet.  · You have trouble controlling your bladder or your bowels.  · You have severe back pain and have any of the following:  ? Nausea or vomiting.  ? Pain in your abdomen.  ? Shortness of breath or you faint.  Summary  · Chronic back pain is back pain that lasts longer than 3 months.  · When a flare-up begins, apply ice to the painful area for the first 24-48 hours.  · Apply a moist heat pad or use a heating pad on the painful area as directed by your health care provider.  · When you are resting for longer periods, mix in some mild activity or stretching between periods of rest. This will help to prevent stiffness and pain.  This information is not intended to replace advice given to you by your health care provider. Make sure you discuss any questions you have with your health care provider.  Document Revised: 01/27/2021 Document Reviewed: 01/27/2021  Elsevier Patient Education © 2021 [employer]

## 2021-08-02 NOTE — PROGRESS NOTES
"Andre Carlos is a 56 y.o. female presents for   Chief Complaint   Patient presents with   • Joint Swelling   • Back Pain       Health Maintenance Due   Topic Date Due   • ZOSTER VACCINE (1 of 2) Never done   • PAP SMEAR  07/26/2019       History of Present Illness   Pt present with complaints of back pain and lower extremity swelling.  She states back pain started a few days ago, and ankles began swelling this am.  She feels her bloodpressure is up due to pain and states she avoids sodium due to blood pressure.  Blood pressure typically runs 120s/80s at home.  She denies chest pain or shortness of breath. She states she doesn't eat salt, but reports eating sausage, eggs and hashbrowns for breakfast this morning.  Discussed hidden sources of sodium with pt.  Pt reports she has a disability exam on Friday and is schedule for xrays of her lower back at that time.  She is also treated for pain through pain management and is taking pain medication as directed.     Vitals:    08/02/21 1339 08/02/21 1407   BP: 179/100 146/86   BP Location: Left arm Left arm   Patient Position: Sitting Sitting   Cuff Size: Adult    Pulse: 56    Temp: 98.2 °F (36.8 °C)    SpO2: 97%    Weight: 76.2 kg (168 lb)    Height: 162.6 cm (64.02\")      Body mass index is 28.82 kg/m².    Current Outpatient Medications on File Prior to Visit   Medication Sig Dispense Refill   • atenolol (TENORMIN) 50 MG tablet Take 1 tablet by mouth Daily. 90 tablet 1   • atorvastatin (Lipitor) 80 MG tablet Take 1 tablet by mouth Daily. 90 tablet 3   • Cholecalciferol (VITAMIN D) 2000 units capsule VITAMIN D 2000 UNIT CAPS     • hydrALAZINE (APRESOLINE) 100 MG tablet Take 1 tablet by mouth 3 (Three) Times a Day. 90 tablet 1   • HYDROcodone-acetaminophen (NORCO)  MG per tablet Take 1 tablet by mouth Every 6 (Six) Hours.  0   • levothyroxine (SYNTHROID, LEVOTHROID) 25 MCG tablet Take 1 tablet by mouth daily on empty stomach for one hour 90 tablet 3 "   • tiZANidine (ZANAFLEX) 4 MG tablet Take 4 mg by mouth every night at bedtime.  5   • trifluridine (VIROPTIC) 1 % ophthalmic solution Administer 1 drop to both eyes 3 (Three) Times a Day.  0   • valACYclovir (VALTREX) 500 MG tablet Take 500 mg by mouth Daily.     • venlafaxine XR (EFFEXOR-XR) 37.5 MG 24 hr capsule TAKE ONE CAPSULE BY MOUTH EVERY DAY (TAKE FOR FOURTEEN DAYS AND THEN MAY INCREASE TO 75 MG EVERY DAY)     • vitamin B-12 (CYANOCOBALAMIN) 1000 MCG tablet Take 2,500 mcg by mouth Daily.     • vitamin C (ASCORBIC ACID) 250 MG tablet Take 1,000 mg by mouth Daily.     • amoxicillin-clavulanate (AUGMENTIN) 875-125 MG per tablet Take 1 tablet by mouth 2 (Two) Times a Day. 20 tablet 0   • Budesonide (ENTOCORT EC) 3 MG 24 hr capsule Take 3 mg TID x 6 weeks 126 capsule 0   • dicyclomine (BENTYL) 10 MG capsule Take 1 capsule by mouth 4 (Four) Times a Day Before Meals & at Bedtime. 120 capsule 1   • famciclovir (FAMVIR) 500 MG tablet Take 500 mg by mouth Daily.       No current facility-administered medications on file prior to visit.       The following portions of the patient's history were reviewed and updated as appropriate: allergies, current medications, past family history, past medical history, past social history, past surgical history, and problem list.    Review of Systems   Constitutional: Negative for chills and fever.   HENT: Negative for sinus pressure and sore throat.    Eyes: Negative for blurred vision.   Respiratory: Negative for cough and shortness of breath.    Cardiovascular: Positive for leg swelling (bilateral ankles). Negative for chest pain.   Gastrointestinal: Negative for abdominal pain.   Endocrine: Negative.    Genitourinary: Negative.    Musculoskeletal: Positive for back pain. Negative for arthralgias and joint swelling.   Skin: Negative for color change.   Allergic/Immunologic: Negative.    Neurological: Negative for dizziness.   Hematological: Negative.    Psychiatric/Behavioral:  Negative for behavioral problems.       Objective   Physical Exam  Vitals and nursing note reviewed.   Constitutional:       Appearance: Normal appearance. She is well-developed.   HENT:      Head: Normocephalic and atraumatic.      Right Ear: External ear normal.      Left Ear: External ear normal.      Nose: Nose normal.   Eyes:      Extraocular Movements: Extraocular movements intact.      Conjunctiva/sclera: Conjunctivae normal.      Pupils: Pupils are equal, round, and reactive to light.   Cardiovascular:      Rate and Rhythm: Normal rate and regular rhythm.      Pulses: Normal pulses.      Heart sounds: Normal heart sounds.   Pulmonary:      Effort: Pulmonary effort is normal.      Breath sounds: Normal breath sounds.   Abdominal:      General: Bowel sounds are normal.      Palpations: Abdomen is soft.   Genitourinary:     Vagina: Normal.   Musculoskeletal:         General: Normal range of motion.      Cervical back: Normal range of motion and neck supple.      Right lower leg: Edema (1+, ankle) present.      Left lower leg: Edema (1+, ankle) present.      Comments: Lumbar spine nontender to palpation, bilateral lower extremity reflexes equal and wnl.  RLE strength 4/5, LLE strength 5/5  Lumbar ROM: flexion limited and painful, extension wnl, painful, lateral bend wnl bilaterally, rotation pain more to right than left   Skin:     General: Skin is warm and dry.   Neurological:      General: No focal deficit present.      Mental Status: She is alert and oriented to person, place, and time.   Psychiatric:         Mood and Affect: Mood normal.         Behavior: Behavior normal.         Thought Content: Thought content normal.         Judgment: Judgment normal.       PHQ-9 Total Score:      Assessment/Plan   Diagnoses and all orders for this visit:    1. Acute right-sided low back pain without sciatica (Primary)  Comments:  instructed on use of ice/heat, follow up with pain managent thursday, disability exam  "friday  Orders:  -     POCT urinalysis dipstick, automated    2. Edema of both lower legs  Comments:  instructed on sources of sodium, instructed to elevate lower legs, may add a diuretic prn depending on lab results.   Orders:  -     CBC Auto Differential  -     Comprehensive Metabolic Panel        Patient Instructions   [email or web address]">   Chronic Back Pain  When back pain lasts longer than 3 months, it is called chronic back pain. The cause of your back pain may not be known. Some common causes include:  · Wear and tear (degenerative disease) of the bones, ligaments, or disks in your back.  · Inflammation and stiffness in your back (arthritis).  People who have chronic back pain often go through certain periods in which the pain is more intense (flare-ups). Many people can learn to manage the pain with home care.  Follow these instructions at home:  Pay attention to any changes in your symptoms. Take these actions to help with your pain:  Managing pain and stiffness         · If directed, apply ice to the painful area. Your health care provider may recommend applying ice during the first 24-48 hours after a flare-up begins. To do this:  ? Put ice in a plastic bag.  ? Place a towel between your skin and the bag.  ? Leave the ice on for 20 minutes, 2-3 times per day.  · If directed, apply heat to the affected area as often as told by your health care provider. Use the heat source that your health care provider recommends, such as a moist heat pack or a heating pad.  ? Place a towel between your skin and the heat source.  ? Leave the heat on for 20-30 minutes.  ? Remove the heat if your skin turns bright red. This is especially important if you are unable to feel pain, heat, or cold. You may have a greater risk of getting burned.  · Try soaking in a warm tub.  Activity    · Avoid bending and other activities that make the problem worse.  · Maintain a proper position when standing or " sitting:  ? When standing, keep your upper back and neck straight, with your shoulders pulled back. Avoid slouching.  ? When sitting, keep your back straight and relax your shoulders. Do not round your shoulders or pull them backward.  · Do not sit or  one place for long periods of time.  · Take brief periods of rest throughout the day. This will reduce your pain. Resting in a lying or standing position is usually better than sitting to rest.  · When you are resting for longer periods, mix in some mild activity or stretching between periods of rest. This will help to prevent stiffness and pain.  · Get regular exercise. Ask your health care provider what activities are safe for you.  · Do not lift anything that is heavier than 10 lb (4.5 kg), or the limit that you are told, until your health care provider says that it is safe. Always use proper lifting technique, which includes:  ? Bending your knees.  ? Keeping the load close to your body.  ? Avoiding twisting.  · Sleep on a firm mattress in a comfortable position. Try lying on your side with your knees slightly bent. If you lie on your back, put a pillow under your knees.  Medicines  · Treatment may include medicines for pain and inflammation taken by mouth or applied to the skin, prescription pain medicine, or muscle relaxants. Take over-the-counter and prescription medicines only as told by your health care provider.  · Ask your health care provider if the medicine prescribed to you:  ? Requires you to avoid driving or using machinery.  ? Can cause constipation. You may need to take these actions to prevent or treat constipation:  § Drink enough fluid to keep your urine pale yellow.  § Take over-the-counter or prescription medicines.  § Eat foods that are high in fiber, such as beans, whole grains, and fresh fruits and vegetables.  § Limit foods that are high in fat and processed sugars, such as fried or sweet foods.  General instructions  · Do not use  any products that contain nicotine or tobacco, such as cigarettes, e-cigarettes, and chewing tobacco. If you need help quitting, ask your health care provider.  · Keep all follow-up visits as told by your health care provider. This is important.  Contact a health care provider if:  · You have pain that is not relieved with rest or medicine.  · Your pain gets worse, or you have new pain.  · You have a high fever.  · You have rapid weight loss.  · You have trouble doing your normal activities.  Get help right away if:  · You have weakness or numbness in one or both of your legs or feet.  · You have trouble controlling your bladder or your bowels.  · You have severe back pain and have any of the following:  ? Nausea or vomiting.  ? Pain in your abdomen.  ? Shortness of breath or you faint.  Summary  · Chronic back pain is back pain that lasts longer than 3 months.  · When a flare-up begins, apply ice to the painful area for the first 24-48 hours.  · Apply a moist heat pad or use a heating pad on the painful area as directed by your health care provider.  · When you are resting for longer periods, mix in some mild activity or stretching between periods of rest. This will help to prevent stiffness and pain.  This information is not intended to replace advice given to you by your health care provider. Make sure you discuss any questions you have with your health care provider.  Document Revised: 01/27/2021 Document Reviewed: 01/27/2021  Elsevier Patient Education © 2021 [employer]

## 2021-08-03 ENCOUNTER — TELEPHONE (OUTPATIENT)
Dept: FAMILY MEDICINE CLINIC | Facility: CLINIC | Age: 56
End: 2021-08-03

## 2021-08-03 NOTE — TELEPHONE ENCOUNTER
HUB TO READ    ----- Message from RUBIN Wrya sent at 8/3/2021  7:58 AM EDT -----    Please notify pt her sodium and chloride levels are low.  Ok to add some salt to foods or drink electrolyte drinks (powerade, gatorade)  Hemoglobin slightly low, but looks like it has fluctuated over the years-make sure to eat iron rich foods.  Ask if her swelling has improved today.

## 2021-08-31 ENCOUNTER — OFFICE VISIT (OUTPATIENT)
Dept: FAMILY MEDICINE CLINIC | Facility: CLINIC | Age: 56
End: 2021-08-31

## 2021-08-31 VITALS
HEIGHT: 64 IN | TEMPERATURE: 98 F | DIASTOLIC BLOOD PRESSURE: 101 MMHG | HEART RATE: 58 BPM | BODY MASS INDEX: 28.34 KG/M2 | SYSTOLIC BLOOD PRESSURE: 182 MMHG | OXYGEN SATURATION: 95 % | WEIGHT: 166 LBS

## 2021-08-31 DIAGNOSIS — J02.9 SORE THROAT: Primary | ICD-10-CM

## 2021-08-31 LAB
EXPIRATION DATE: NORMAL
INTERNAL CONTROL: NORMAL
Lab: NORMAL
S PYO AG THROAT QL: NEGATIVE
SARS-COV-2 ORF1AB RESP QL NAA+PROBE: NOT DETECTED

## 2021-08-31 PROCEDURE — U0004 COV-19 TEST NON-CDC HGH THRU: HCPCS | Performed by: PREVENTIVE MEDICINE

## 2021-08-31 PROCEDURE — 99213 OFFICE O/P EST LOW 20 MIN: CPT | Performed by: PREVENTIVE MEDICINE

## 2021-08-31 PROCEDURE — 87880 STREP A ASSAY W/OPTIC: CPT | Performed by: PREVENTIVE MEDICINE

## 2021-08-31 NOTE — PROGRESS NOTES
"Andre Carlos is a 56 y.o. female presents for   Chief Complaint   Patient presents with   • Sore Throat     x 2days     Patient awoke yesterday with a sore throat that she thought might be an allergic reaction to some peppers that she has had the day before.  Patient has had a Covid vaccination has not been around evaluate has had any sore throat or Covid that she is aware of.  Sister did have Covid approximately 3 weeks ago when she was checked at that time and was negative.  Patient does feel as though her tonsils are swollen and that she has some neck adenopathy.  She has not had any new diarrhea slight headache but no increase in fatigue.  Really feels pretty well.  Health Maintenance Due   Topic Date Due   • ZOSTER VACCINE (1 of 2) Never done   • PAP SMEAR  07/26/2019   • COLORECTAL CANCER SCREENING  08/04/2021       History of Present Illness     Vitals:    08/31/21 1345 08/31/21 1347   BP: (!) 185/108 (!) 182/101   BP Location: Right arm Left arm   Patient Position: Sitting Sitting   Cuff Size: Adult Adult   Pulse: 58    Temp: 98 °F (36.7 °C)    SpO2: 95%    Weight: 75.3 kg (166 lb)    Height: 162.6 cm (64.02\")      Body mass index is 28.48 kg/m².    Current Outpatient Medications on File Prior to Visit   Medication Sig Dispense Refill   • atenolol (TENORMIN) 50 MG tablet Take 1 tablet by mouth Daily. 90 tablet 1   • atorvastatin (Lipitor) 80 MG tablet Take 1 tablet by mouth Daily. 90 tablet 3   • Budesonide (ENTOCORT EC) 3 MG 24 hr capsule Take 3 mg TID x 6 weeks 126 capsule 0   • Cholecalciferol (VITAMIN D) 2000 units capsule VITAMIN D 2000 UNIT CAPS     • dicyclomine (BENTYL) 10 MG capsule Take 1 capsule by mouth 4 (Four) Times a Day Before Meals & at Bedtime. 120 capsule 1   • famciclovir (FAMVIR) 500 MG tablet Take 500 mg by mouth Daily.     • hydrALAZINE (APRESOLINE) 100 MG tablet Take 1 tablet by mouth 3 (Three) Times a Day. 90 tablet 1   • HYDROcodone-acetaminophen (NORCO)  MG " per tablet Take 1 tablet by mouth Every 6 (Six) Hours.  0   • levothyroxine (SYNTHROID, LEVOTHROID) 25 MCG tablet Take 1 tablet by mouth daily on empty stomach for one hour 90 tablet 3   • tiZANidine (ZANAFLEX) 4 MG tablet Take 4 mg by mouth every night at bedtime.  5   • trifluridine (VIROPTIC) 1 % ophthalmic solution Administer 1 drop to both eyes 3 (Three) Times a Day.  0   • valACYclovir (VALTREX) 500 MG tablet Take 500 mg by mouth Daily.     • venlafaxine XR (EFFEXOR-XR) 37.5 MG 24 hr capsule TAKE ONE CAPSULE BY MOUTH EVERY DAY (TAKE FOR FOURTEEN DAYS AND THEN MAY INCREASE TO 75 MG EVERY DAY)     • vitamin B-12 (CYANOCOBALAMIN) 1000 MCG tablet Take 2,500 mcg by mouth Daily.     • vitamin C (ASCORBIC ACID) 250 MG tablet Take 1,000 mg by mouth Daily.     • [DISCONTINUED] amoxicillin-clavulanate (AUGMENTIN) 875-125 MG per tablet Take 1 tablet by mouth 2 (Two) Times a Day. 20 tablet 0     No current facility-administered medications on file prior to visit.       The following portions of the patient's history were reviewed and updated as appropriate: allergies, current medications, past family history, past medical history, past social history, past surgical history and problem list.    Review of Systems   HENT: Positive for sore throat and swollen glands.    Gastrointestinal:        Mild dysphagia       Objective   Physical Exam  Vitals reviewed.   Constitutional:       General: She is not in acute distress.     Appearance: Normal appearance. She is well-developed. She is not ill-appearing or toxic-appearing.   HENT:      Head: Normocephalic and atraumatic.      Right Ear: Tympanic membrane, ear canal and external ear normal.      Left Ear: Tympanic membrane, ear canal and external ear normal.      Nose: Nose normal.      Mouth/Throat:      Mouth: Mucous membranes are moist.      Pharynx: Posterior oropharyngeal erythema present.      Comments: Tonsils enlarged no exudate  Eyes:      Extraocular Movements:  Extraocular movements intact.      Conjunctiva/sclera: Conjunctivae normal.      Pupils: Pupils are equal, round, and reactive to light.   Cardiovascular:      Rate and Rhythm: Normal rate and regular rhythm.      Heart sounds: Normal heart sounds.   Pulmonary:      Effort: Pulmonary effort is normal.      Comments: Decreased breath sounds bilaterally  Abdominal:      General: Bowel sounds are normal. There is no distension.      Palpations: Abdomen is soft. There is no mass.      Tenderness: There is no abdominal tenderness.   Musculoskeletal:         General: Normal range of motion.      Cervical back: Neck supple.   Lymphadenopathy:      Cervical: Cervical adenopathy present.   Skin:     General: Skin is warm.   Neurological:      General: No focal deficit present.      Mental Status: She is alert and oriented to person, place, and time.   Psychiatric:         Mood and Affect: Mood normal.         Behavior: Behavior normal.       PHQ-9 Total Score:      Assessment/Plan   Diagnoses and all orders for this visit:    1. Sore throat (Primary)  Comments:  Patient awoke with a sore throat yesterday after eating peppers and sausages the night before she feels as though she may be having an allergic reaction to the   Orders:  -     POCT rapid strep A  -     COVID-19,APTIMA PANTHER(TESFAYE),[facility], NP/OP SWAB IN UTM/VTM/SALINE TRANSPORT MEDIA,24 HR TAT - Swab, Nasopharynx; Future  -     COVID-19,APTIMA PANTHER(TESFYAE),[facility], NP/OP SWAB IN UTM/VTM/SALINE TRANSPORT MEDIA,24 HR TAT - Swab, Nasopharynx        Patient Instructions   1/4 tsp salt in 4 ounces water gargled for throat pain.    How to Quarantine at Home  Information for Patients and Families    These instructions are for people with confirmed or suspected COVID-19 who do not need to be hospitalized and those with confirmed COVID-19 who were hospitalized and discharged to care for themselves at home.    If you were tested through the Health Department  The Health  Department will monitor your wellbeing.  If it is determined that you do not need to be hospitalized and can be isolated at home, you will be monitored by staff from your local or state health department.     If you were tested through a Commercial Lab  You will need to monitor yourself and report changes in your symptoms to your doctor.  See the section below called Monitor Your Symptoms.    Follow these steps until a healthcare provider or local or state health department says you can return to your normal activities.    Stay home except to get medical care  • Restrict activities outside your home, except for getting medical care.   • Do not go to work, school, or public areas.   • Avoid using public transportation, ride-sharing, or taxis.    Separate yourself from other people and animals in your home  People  As much as possible, you should stay in a specific room and away from other people in your home. Also, you should use a separate bathroom, if available.    Animals  You should restrict contact with pets and other animals while you are sick with COVID-19, just like you would around other people. When possible, have another member of your household care for your animals while you are sick. If you are sick with COVID-19, avoid contact with your pet, including petting, snuggling, being kissed or licked, and sharing food. If you must care for your pet or be around animals while you are sick, wash your hands before and after you interact with pets and wear a facemask. See COVID-19 and Animals for more information.    Call ahead before visiting your doctor  If you have a medical appointment, call the healthcare provider and tell them that you have or may have COVID-19. This information will help the healthcare provider’s office take steps to keep other people from getting infected or exposed.    Wear a facemask  You should wear a facemask when you are around other people (e.g., sharing a room or vehicle) or pets  and before you enter a healthcare provider’s office.     If you are not able to wear a facemask (for example, because it causes trouble breathing), then people who live with you should not stay in the same room with you, or they should wear a facemask if they enter your room.    Cover your coughs and sneezes  • Cover your mouth and nose with a tissue when you cough or sneeze.   • Throw used tissues in a lined trash can.   • Immediately wash your hands with soap and water for at least 20 seconds or, if soap and water are not available, clean your hands with an alcohol-based hand  that contains at least 60% alcohol.    Clean your hands often  • Wash your hands often with soap and water for at least 20 seconds, especially after blowing your nose, coughing, or sneezing; going to the bathroom; and before eating or preparing food.     • If soap and water are not readily available, use an alcohol-based hand  with at least 60% alcohol, covering all surfaces of your hands and rubbing them together until they feel dry.    • Soap and water are the best option if hands are visibly dirty. Avoid touching your eyes, nose, and mouth with unwashed hands.    Avoid sharing personal household items  • You should not share dishes, drinking glasses, cups, eating utensils, towels, or bedding with other people or pets in your home.   • After using these items, they should be washed thoroughly with soap and water.    Clean all “high-touch” surfaces everyday  • High touch surfaces include counters, tabletops, doorknobs, bathroom fixtures, toilets, phones, keyboards, tablets, and bedside tables.   • Also, clean any surfaces that may have blood, stool, or body fluids on them.   • Use a household cleaning spray or wipe, according to the label instructions. Labels contain instructions for safe and effective use of the cleaning product, including precautions you should take when applying the product, such as wearing gloves and  making sure you have good ventilation during use of the product.    Monitor your symptoms  • Seek prompt medical attention if your illness is worsening (e.g., difficulty breathing).   • Before seeking care, call your healthcare provider and tell them that you have, or are being evaluated for, COVID-19.   • Put on a facemask before you enter the facility.     • These steps will help the healthcare provider’s office to keep other people in the office or waiting room from getting infected or exposed.   • Persons who are placed under active monitoring or facilitated self-monitoring should follow instructions provided by their local health department or occupational health professionals, as appropriate.  • If you have a medical emergency and need to call 911, notify the dispatch personnel that you have, or are being evaluated for COVID-19. If possible, put on a facemask before emergency medical services arrive.    Discontinuing home isolation  Patients with confirmed COVID-19 should remain under home isolation precautions until the risk of secondary transmission to others is thought to be low. The decision to discontinue home isolation precautions should be made on a case-by-case basis, in consultation with healthcare providers and state and local health departments.    The below content are for household members, intimate partners, and caregivers of a patient with symptomatic laboratory-confirmed COVID-19 or a patient under investigation:    Household members, intimate partners, and caregivers may have close contact with a person with symptomatic, laboratory-confirmed COVID-19 or a person under investigation.     Close contacts should monitor their health; they should call their healthcare provider right away if they develop symptoms suggestive of COVID-19 (e.g., fever, cough, shortness of breath)     Close contacts should also follow these recommendations:  • Make sure that you understand and can help the patient  follow their healthcare provider’s instructions for medication(s) and care. You should help the patient with basic needs in the home and provide support for getting groceries, prescriptions, and other personal needs.  • Monitor the patient’s symptoms. If the patient is getting sicker, call his or her healthcare provider and tell them that the patient has laboratory-confirmed COVID-19. This will help the healthcare provider’s office take steps to keep other people in the office or waiting room from getting infected. Ask the healthcare provider to call the local or [facility] health department for additional guidance. If the patient has a medical emergency and you need to call 911, notify the dispatch personnel that the patient has, or is being evaluated for COVID-19.  • Household members should stay in another room or be  from the patient as much as possible. Household members should use a separate bedroom and bathroom, if available.  • Prohibit visitors who do not have an essential need to be in the home.  • Household members should care for any pets in the home. Do not handle pets or other animals while sick.  For more information, see COVID-19 and Animals.  • Make sure that shared spaces in the home have good air flow, such as by an air conditioner or an opened window, weather permitting.  • Perform hand hygiene frequently. Wash your hands often with soap and water for at least 20 seconds or use an alcohol-based hand  that contains 60 to 95% alcohol, covering all surfaces of your hands and rubbing them together until they feel dry. Soap and water should be used preferentially if hands are visibly dirty.  • Avoid touching your eyes, nose, and mouth with unwashed hands.  • The patient should wear a facemask when you are around other people. If the patient is not able to wear a facemask (for example, because it causes trouble breathing), you, as the caregiver, should wear a mask when you are in the same  room as the patient.  • Wear a disposable facemask and gloves when you touch or have contact with the patient’s blood, stool, or body fluids, such as saliva, sputum, nasal mucus, vomit, or urine.   o Throw out disposable facemasks and gloves after using them. Do not reuse.  o When removing personal protective equipment, first remove and dispose of gloves. Then, immediately clean your hands with soap and water or alcohol-based hand . Next, remove and dispose of facemask, and immediately clean your hands again with soap and water or alcohol-based hand .  • Avoid sharing household items with the patient. You should not share dishes, drinking glasses, cups, eating utensils, towels, bedding, or other items. After the patient uses these items, you should wash them thoroughly (see below “Wash laundry thoroughly”).  • Clean all “high-touch” surfaces, such as counters, tabletops, doorknobs, bathroom fixtures, toilets, phones, keyboards, tablets, and bedside tables, every day. Also, clean any surfaces that may have blood, stool, or body fluids on them.   o Use a household cleaning spray or wipe, according to the label instructions. Labels contain instructions for safe and effective use of the cleaning product including precautions you should take when applying the product, such as wearing gloves and making sure you have good ventilation during use of the product.  • Wash laundry thoroughly.   o Immediately remove and wash clothes or bedding that have blood, stool, or body fluids on them.  o Wear disposable gloves while handling soiled items and keep soiled items away from your body. Clean your hands (with soap and water or an alcohol-based hand ) immediately after removing your gloves.  o Read and follow directions on labels of laundry or clothing items and detergent. In general, using a normal laundry detergent according to washing machine instructions and dry thoroughly using the warmest  temperatures recommended on the clothing label.  • Place all used disposable gloves, facemasks, and other contaminated items in a lined container before disposing of them with other household waste. Clean your hands (with soap and water or an alcohol-based hand ) immediately after handling these items. Soap and water should be used preferentially if hands are visibly dirty.  • Discuss any additional questions with your state or local health department or healthcare provider.    Adapted from information provided by the Centers for Disease Control and Prevention.  For more information, visit https://www.cdc.gov/coronavirus/2019-ncov/hcp/guidance-prevent-spread.html      Repeat Covid test end of this week or beginning of next if first test negative.  If breathing or swallowing difficulties-call 911.     Check blood pressure cuff for accuracy and send 10 blood pressures over 2 weeks.  Watch sodium, alcohol and weight

## 2021-08-31 NOTE — PATIENT INSTRUCTIONS
1/4 tsp salt in 4 ounces water gargled for throat pain.    How to Quarantine at Home  Information for Patients and Families    These instructions are for people with confirmed or suspected COVID-19 who do not need to be hospitalized and those with confirmed COVID-19 who were hospitalized and discharged to care for themselves at home.    If you were tested through the Health Department  The Health Department will monitor your wellbeing.  If it is determined that you do not need to be hospitalized and can be isolated at home, you will be monitored by staff from your local or state health department.     If you were tested through a Commercial [facility]  You will need to monitor yourself and report changes in your symptoms to your doctor.  See the section below called Monitor Your Symptoms.    Follow these steps until a healthcare provider or local or state health department says you can return to your normal activities.    Stay home except to get medical care  • Restrict activities outside your home, except for getting medical care.   • Do not go to work, school, or public areas.   • Avoid using public transportation, ride-sharing, or taxis.    Separate yourself from other people and animals in your home  People  As much as possible, you should stay in a specific room and away from other people in your home. Also, you should use a separate bathroom, if available.    Animals  You should restrict contact with pets and other animals while you are sick with COVID-19, just like you would around other people. When possible, have another member of your household care for your animals while you are sick. If you are sick with COVID-19, avoid contact with your pet, including petting, snuggling, being kissed or licked, and sharing food. If you must care for your pet or be around animals while you are sick, wash your hands before and after you interact with pets and wear a facemask. See COVID-19 and Animals for more  information.    Call ahead before visiting your doctor  If you have a medical appointment, call the healthcare provider and tell them that you have or may have COVID-19. This information will help the healthcare provider’s office take steps to keep other people from getting infected or exposed.    Wear a facemask  You should wear a facemask when you are around other people (e.g., sharing a room or vehicle) or pets and before you enter a healthcare provider’s office.     If you are not able to wear a facemask (for example, because it causes trouble breathing), then people who live with you should not stay in the same room with you, or they should wear a facemask if they enter your room.    Cover your coughs and sneezes  • Cover your mouth and nose with a tissue when you cough or sneeze.   • Throw used tissues in a lined trash can.   • Immediately wash your hands with soap and water for at least 20 seconds or, if soap and water are not available, clean your hands with an alcohol-based hand  that contains at least 60% alcohol.    Clean your hands often  • Wash your hands often with soap and water for at least 20 seconds, especially after blowing your nose, coughing, or sneezing; going to the bathroom; and before eating or preparing food.     • If soap and water are not readily available, use an alcohol-based hand  with at least 60% alcohol, covering all surfaces of your hands and rubbing them together until they feel dry.    • Soap and water are the best option if hands are visibly dirty. Avoid touching your eyes, nose, and mouth with unwashed hands.    Avoid sharing personal household items  • You should not share dishes, drinking glasses, cups, eating utensils, towels, or bedding with other people or pets in your home.   • After using these items, they should be washed thoroughly with soap and water.    Clean all “high-touch” surfaces everyday  • High touch surfaces include counters, tabletops,  doorknobs, bathroom fixtures, toilets, phones, keyboards, tablets, and bedside tables.   • Also, clean any surfaces that may have blood, stool, or body fluids on them.   • Use a household cleaning spray or wipe, according to the label instructions. Labels contain instructions for safe and effective use of the cleaning product, including precautions you should take when applying the product, such as wearing gloves and making sure you have good ventilation during use of the product.    Monitor your symptoms  • Seek prompt medical attention if your illness is worsening (e.g., difficulty breathing).   • Before seeking care, call your healthcare provider and tell them that you have, or are being evaluated for, COVID-19.   • Put on a facemask before you enter the facility.     • These steps will help the healthcare provider’s office to keep other people in the office or waiting room from getting infected or exposed.   • Persons who are placed under active monitoring or facilitated self-monitoring should follow instructions provided by their local health department or occupational health professionals, as appropriate.  • If you have a medical emergency and need to call 911, notify the dispatch personnel that you have, or are being evaluated for COVID-19. If possible, put on a facemask before emergency medical services arrive.    Discontinuing home isolation  Patients with confirmed COVID-19 should remain under home isolation precautions until the risk of secondary transmission to others is thought to be low. The decision to discontinue home isolation precautions should be made on a case-by-case basis, in consultation with healthcare providers and state and local health departments.    The below content are for household members, intimate partners, and caregivers of a patient with symptomatic laboratory-confirmed COVID-19 or a patient under investigation:    Household members, intimate partners, and caregivers may have  close contact with a person with symptomatic, laboratory-confirmed COVID-19 or a person under investigation.     Close contacts should monitor their health; they should call their healthcare provider right away if they develop symptoms suggestive of COVID-19 (e.g., fever, cough, shortness of breath)     Close contacts should also follow these recommendations:  • Make sure that you understand and can help the patient follow their healthcare provider’s instructions for medication(s) and care. You should help the patient with basic needs in the home and provide support for getting groceries, prescriptions, and other personal needs.  • Monitor the patient’s symptoms. If the patient is getting sicker, call his or her healthcare provider and tell them that the patient has laboratory-confirmed COVID-19. This will help the healthcare provider’s office take steps to keep other people in the office or waiting room from getting infected. Ask the healthcare provider to call the local or [facility] health department for additional guidance. If the patient has a medical emergency and you need to call 911, notify the dispatch personnel that the patient has, or is being evaluated for COVID-19.  • Household members should stay in another room or be  from the patient as much as possible. Household members should use a separate bedroom and bathroom, if available.  • Prohibit visitors who do not have an essential need to be in the home.  • Household members should care for any pets in the home. Do not handle pets or other animals while sick.  For more information, see COVID-19 and Animals.  • Make sure that shared spaces in the home have good air flow, such as by an air conditioner or an opened window, weather permitting.  • Perform hand hygiene frequently. Wash your hands often with soap and water for at least 20 seconds or use an alcohol-based hand  that contains 60 to 95% alcohol, covering all surfaces of your hands and  rubbing them together until they feel dry. Soap and water should be used preferentially if hands are visibly dirty.  • Avoid touching your eyes, nose, and mouth with unwashed hands.  • The patient should wear a facemask when you are around other people. If the patient is not able to wear a facemask (for example, because it causes trouble breathing), you, as the caregiver, should wear a mask when you are in the same room as the patient.  • Wear a disposable facemask and gloves when you touch or have contact with the patient’s blood, stool, or body fluids, such as saliva, sputum, nasal mucus, vomit, or urine.   o Throw out disposable facemasks and gloves after using them. Do not reuse.  o When removing personal protective equipment, first remove and dispose of gloves. Then, immediately clean your hands with soap and water or alcohol-based hand . Next, remove and dispose of facemask, and immediately clean your hands again with soap and water or alcohol-based hand .  • Avoid sharing household items with the patient. You should not share dishes, drinking glasses, cups, eating utensils, towels, bedding, or other items. After the patient uses these items, you should wash them thoroughly (see below “Wash laundry thoroughly”).  • Clean all “high-touch” surfaces, such as counters, tabletops, doorknobs, bathroom fixtures, toilets, phones, keyboards, tablets, and bedside tables, every day. Also, clean any surfaces that may have blood, stool, or body fluids on them.   o Use a household cleaning spray or wipe, according to the label instructions. Labels contain instructions for safe and effective use of the cleaning product including precautions you should take when applying the product, such as wearing gloves and making sure you have good ventilation during use of the product.  • Wash laundry thoroughly.   o Immediately remove and wash clothes or bedding that have blood, stool, or body fluids on them.  o Wear  disposable gloves while handling soiled items and keep soiled items away from your body. Clean your hands (with soap and water or an alcohol-based hand ) immediately after removing your gloves.  o Read and follow directions on labels of laundry or clothing items and detergent. In general, using a normal laundry detergent according to washing machine instructions and dry thoroughly using the warmest temperatures recommended on the clothing label.  • Place all used disposable gloves, facemasks, and other contaminated items in a lined container before disposing of them with other household waste. Clean your hands (with soap and water or an alcohol-based hand ) immediately after handling these items. Soap and water should be used preferentially if hands are visibly dirty.  • Discuss any additional questions with your state or local health department or healthcare provider.    Adapted from information provided by the Centers for Disease Control and Prevention.  For more information, visit https://www.cdc.gov/coronavirus/2019-ncov/hcp/guidance-prevent-spread.html      Repeat Covid test end of this week or beginning of next if first test negative.  If breathing or swallowing difficulties-call 911.     Check blood pressure cuff for accuracy and send 10 blood pressures over 2 weeks.  Watch sodium, alcohol and weight

## 2021-09-07 RX ORDER — ATORVASTATIN CALCIUM 80 MG/1
80 TABLET, FILM COATED ORAL DAILY
Qty: 90 TABLET | Refills: 3 | Status: SHIPPED | OUTPATIENT
Start: 2021-09-07 | End: 2022-08-15

## 2021-09-07 RX ORDER — HYDRALAZINE HYDROCHLORIDE 100 MG/1
TABLET, FILM COATED ORAL
Qty: 90 TABLET | Refills: 1 | Status: SHIPPED | OUTPATIENT
Start: 2021-09-07 | End: 2021-12-23

## 2021-10-20 RX ORDER — LEVOTHYROXINE SODIUM 0.03 MG/1
TABLET ORAL
Qty: 90 TABLET | Refills: 3 | Status: SHIPPED | OUTPATIENT
Start: 2021-10-20 | End: 2022-05-11

## 2021-11-22 PROCEDURE — U0004 COV-19 TEST NON-CDC HGH THRU: HCPCS | Performed by: FAMILY MEDICINE

## 2021-11-23 ENCOUNTER — TELEPHONE (OUTPATIENT)
Dept: FAMILY MEDICINE CLINIC | Facility: CLINIC | Age: 56
End: 2021-11-23

## 2021-11-23 NOTE — TELEPHONE ENCOUNTER
I have completed form for monoclonal antibody infusion.  From review of the note from urgent care, antibiotics are not indicated.

## 2021-11-23 NOTE — TELEPHONE ENCOUNTER
Caller: Melodie Carlos    Relationship to patient: Self    Best call back number: [phone] ([phone])      Patient is needing: PATIENT STATES SHE WENT TO URGENT CARE AT [facility] ON 11/22/21 WHERE THEY TESTED HER FOR COVID. STATES THEY CALLED HER TODAY TO LET HER KNOW SHE WAS POSITIVE FOR COVID. PATIENT WAS TOLD THAT SHE SHOULD CALL HER PRIMARY CARE TO SEE IF SHE CAN IF CAN GET THE ANTIBODY INFUSION OR ANTIBIOTICS. PLEASE ADVISE. THANK YOU.

## 2021-11-24 PROBLEM — U07.1 COVID-19: Status: ACTIVE | Noted: 2021-11-24

## 2021-11-24 RX ORDER — METHYLPREDNISOLONE SODIUM SUCCINATE 125 MG/2ML
125 INJECTION, POWDER, LYOPHILIZED, FOR SOLUTION INTRAMUSCULAR; INTRAVENOUS AS NEEDED
Status: CANCELLED | OUTPATIENT
Start: 2021-11-26

## 2021-11-24 RX ORDER — DIPHENHYDRAMINE HCL 25 MG
50 TABLET ORAL ONCE AS NEEDED
Status: CANCELLED | OUTPATIENT
Start: 2021-11-26

## 2021-11-24 RX ORDER — DIPHENHYDRAMINE HYDROCHLORIDE 50 MG/ML
50 INJECTION INTRAMUSCULAR; INTRAVENOUS ONCE AS NEEDED
Status: CANCELLED | OUTPATIENT
Start: 2021-11-26

## 2021-11-24 RX ORDER — SODIUM CHLORIDE 9 MG/ML
30 INJECTION, SOLUTION INTRAVENOUS ONCE
Status: CANCELLED | OUTPATIENT
Start: 2021-11-26

## 2021-11-24 RX ORDER — EPINEPHRINE 1 MG/ML
0.3 INJECTION, SOLUTION INTRAMUSCULAR; SUBCUTANEOUS AS NEEDED
Status: CANCELLED | OUTPATIENT
Start: 2021-11-26

## 2021-11-25 ENCOUNTER — TELEPHONE (OUTPATIENT)
Dept: FAMILY MEDICINE CLINIC | Facility: CLINIC | Age: 56
End: 2021-11-25

## 2021-11-25 NOTE — TELEPHONE ENCOUNTER
Patient informed about positive Covid test and is quarantining and will get monoclonal antibody treatment tomorrow.  Knows to go to ER by ambulance if worsens

## 2021-11-26 ENCOUNTER — HOSPITAL ENCOUNTER (OUTPATIENT)
Dept: INFUSION THERAPY | Facility: HOSPITAL | Age: 56
Discharge: HOME | End: 2021-11-26
Admitting: NURSE PRACTITIONER

## 2021-11-26 VITALS
SYSTOLIC BLOOD PRESSURE: 134 MMHG | HEIGHT: 64 IN | TEMPERATURE: 97.9 F | OXYGEN SATURATION: 98 % | BODY MASS INDEX: 28.51 KG/M2 | HEART RATE: 59 BPM | WEIGHT: 167 LBS | RESPIRATION RATE: 16 BRPM | DIASTOLIC BLOOD PRESSURE: 92 MMHG

## 2021-11-26 DIAGNOSIS — U07.1 COVID-19: Primary | ICD-10-CM

## 2021-11-26 PROCEDURE — M0243 CASIRIVI AND IMDEVI INFUSION: HCPCS | Performed by: NURSE PRACTITIONER

## 2021-11-26 PROCEDURE — 96365 THER/PROPH/DIAG IV INF INIT: CPT

## 2021-11-26 PROCEDURE — 25010000002 INJECTION, CASIRIVIMAB AND IMDEVIMAB, 1200 MG: Performed by: NURSE PRACTITIONER

## 2021-11-26 PROCEDURE — 96360 HYDRATION IV INFUSION INIT: CPT

## 2021-11-26 RX ORDER — DIPHENHYDRAMINE HYDROCHLORIDE 50 MG/ML
50 INJECTION INTRAMUSCULAR; INTRAVENOUS ONCE AS NEEDED
Status: DISCONTINUED | OUTPATIENT
Start: 2021-11-26 | End: 2021-11-28 | Stop reason: HOSPADM

## 2021-11-26 RX ORDER — DIPHENHYDRAMINE HCL 25 MG
50 CAPSULE ORAL ONCE AS NEEDED
Status: DISCONTINUED | OUTPATIENT
Start: 2021-11-26 | End: 2021-11-28 | Stop reason: HOSPADM

## 2021-11-26 RX ORDER — DIPHENHYDRAMINE HYDROCHLORIDE 50 MG/ML
50 INJECTION INTRAMUSCULAR; INTRAVENOUS ONCE AS NEEDED
Status: CANCELLED | OUTPATIENT
Start: 2021-11-26

## 2021-11-26 RX ORDER — METHYLPREDNISOLONE SODIUM SUCCINATE 125 MG/2ML
125 INJECTION, POWDER, LYOPHILIZED, FOR SOLUTION INTRAMUSCULAR; INTRAVENOUS AS NEEDED
Status: CANCELLED | OUTPATIENT
Start: 2021-11-26

## 2021-11-26 RX ORDER — EPINEPHRINE 1 MG/ML
0.3 INJECTION, SOLUTION, CONCENTRATE INTRAVENOUS AS NEEDED
Status: DISCONTINUED | OUTPATIENT
Start: 2021-11-26 | End: 2021-11-28 | Stop reason: HOSPADM

## 2021-11-26 RX ORDER — METHYLPREDNISOLONE SODIUM SUCCINATE 125 MG/2ML
125 INJECTION, POWDER, LYOPHILIZED, FOR SOLUTION INTRAMUSCULAR; INTRAVENOUS AS NEEDED
Status: DISCONTINUED | OUTPATIENT
Start: 2021-11-26 | End: 2021-11-28 | Stop reason: HOSPADM

## 2021-11-26 RX ORDER — SODIUM CHLORIDE 9 MG/ML
30 INJECTION, SOLUTION INTRAVENOUS ONCE
Status: COMPLETED | OUTPATIENT
Start: 2021-11-26 | End: 2021-11-26

## 2021-11-26 RX ORDER — EPINEPHRINE 1 MG/ML
0.3 INJECTION, SOLUTION, CONCENTRATE INTRAVENOUS AS NEEDED
Status: CANCELLED | OUTPATIENT
Start: 2021-11-26

## 2021-11-26 RX ORDER — DIPHENHYDRAMINE HCL 25 MG
50 CAPSULE ORAL ONCE AS NEEDED
Status: CANCELLED | OUTPATIENT
Start: 2021-11-26

## 2021-11-26 RX ORDER — SODIUM CHLORIDE 9 MG/ML
30 INJECTION, SOLUTION INTRAVENOUS ONCE
Status: CANCELLED | OUTPATIENT
Start: 2021-11-26

## 2021-11-26 RX ADMIN — IMDEVIMAB: 1332 INJECTION, SOLUTION, CONCENTRATE INTRAVENOUS at 08:15

## 2021-11-26 RX ADMIN — SODIUM CHLORIDE 30 ML: 9 INJECTION, SOLUTION INTRAVENOUS at 08:32

## 2021-11-29 ENCOUNTER — TELEPHONE (OUTPATIENT)
Dept: FAMILY MEDICINE CLINIC | Facility: CLINIC | Age: 56
End: 2021-11-29

## 2021-12-23 RX ORDER — ATENOLOL 50 MG/1
50 TABLET ORAL DAILY
Qty: 30 TABLET | Refills: 1 | Status: SHIPPED | OUTPATIENT
Start: 2021-12-23 | End: 2022-02-23

## 2021-12-23 RX ORDER — HYDRALAZINE HYDROCHLORIDE 100 MG/1
TABLET, FILM COATED ORAL
Qty: 90 TABLET | Refills: 1 | Status: SHIPPED | OUTPATIENT
Start: 2021-12-23 | End: 2022-05-10

## 2022-02-02 PROBLEM — Z92.29 HISTORY OF VACCINATION: Status: ACTIVE | Noted: 2022-02-02

## 2022-02-02 PROBLEM — M51.9 DISORDER OF INTERVERTEBRAL DISC OF LUMBAR SPINE: Status: ACTIVE | Noted: 2022-02-02

## 2022-02-02 PROBLEM — Z87.09 H/O PULMONARY EMPHYSEMA: Status: ACTIVE | Noted: 2022-02-02

## 2022-02-02 PROBLEM — E07.9 DISORDER OF THYROID: Status: ACTIVE | Noted: 2019-02-20

## 2022-02-02 PROBLEM — J43.9 H/O PULMONARY EMPHYSEMA: Status: ACTIVE | Noted: 2022-02-02

## 2022-02-09 NOTE — PATIENT INSTRUCTIONS
Health Maintenance Due   Topic Date Due   • ZOSTER VACCINE (1 of 2) Never done   • PAP SMEAR  07/26/2019   • INFLUENZA VACCINE  Never done   • COLORECTAL CANCER SCREENING  08/04/2021   • COVID-19 Vaccine (3 - Booster for Moderna series) 09/22/2021     Check blood pressure cuff for accuracy and send 10 blood pressures over 2 weeks.  Watch sodium, alcohol and weight      Take antibiotic till gone, rest and fluids.  Call [facility] to report progress.

## 2022-02-10 ENCOUNTER — OFFICE VISIT (OUTPATIENT)
Dept: FAMILY MEDICINE CLINIC | Facility: CLINIC | Age: 57
End: 2022-02-10

## 2022-02-10 VITALS
BODY MASS INDEX: 28.85 KG/M2 | DIASTOLIC BLOOD PRESSURE: 104 MMHG | HEART RATE: 68 BPM | TEMPERATURE: 98 F | SYSTOLIC BLOOD PRESSURE: 167 MMHG | WEIGHT: 169 LBS | OXYGEN SATURATION: 94 % | HEIGHT: 64 IN

## 2022-02-10 DIAGNOSIS — J02.9 SORE THROAT: ICD-10-CM

## 2022-02-10 DIAGNOSIS — K52.832 LYMPHOCYTIC COLITIS: ICD-10-CM

## 2022-02-10 DIAGNOSIS — I63.9 CEREBROVASCULAR ACCIDENT (CVA), UNSPECIFIED MECHANISM: ICD-10-CM

## 2022-02-10 DIAGNOSIS — G89.29 CHRONIC LOW BACK PAIN, UNSPECIFIED BACK PAIN LATERALITY, UNSPECIFIED WHETHER SCIATICA PRESENT: ICD-10-CM

## 2022-02-10 DIAGNOSIS — E07.9 DISORDER OF THYROID: ICD-10-CM

## 2022-02-10 DIAGNOSIS — M54.2 NECK PAIN, CHRONIC: ICD-10-CM

## 2022-02-10 DIAGNOSIS — I10 PRIMARY HYPERTENSION: Primary | ICD-10-CM

## 2022-02-10 DIAGNOSIS — E55.9 VITAMIN D DEFICIENCY: ICD-10-CM

## 2022-02-10 DIAGNOSIS — M54.50 CHRONIC LOW BACK PAIN, UNSPECIFIED BACK PAIN LATERALITY, UNSPECIFIED WHETHER SCIATICA PRESENT: ICD-10-CM

## 2022-02-10 DIAGNOSIS — Z12.4 SCREENING FOR CERVICAL CANCER: ICD-10-CM

## 2022-02-10 DIAGNOSIS — G89.29 NECK PAIN, CHRONIC: ICD-10-CM

## 2022-02-10 DIAGNOSIS — R05.9 COUGH: ICD-10-CM

## 2022-02-10 DIAGNOSIS — Z87.09 H/O PULMONARY EMPHYSEMA: ICD-10-CM

## 2022-02-10 DIAGNOSIS — E78.5 HYPERLIPIDEMIA, UNSPECIFIED HYPERLIPIDEMIA TYPE: ICD-10-CM

## 2022-02-10 DIAGNOSIS — Z12.11 SCREENING FOR COLON CANCER: ICD-10-CM

## 2022-02-10 LAB
25(OH)D3 SERPL-MCNC: 41.6 NG/ML (ref 30–100)
ALBUMIN SERPL-MCNC: 4.7 G/DL (ref 3.5–5.2)
ALBUMIN/GLOB SERPL: 1.8 G/DL
ALP SERPL-CCNC: 87 U/L (ref 39–117)
ALT SERPL W P-5'-P-CCNC: 23 U/L (ref 1–33)
ANION GAP SERPL CALCULATED.3IONS-SCNC: 9.4 MMOL/L (ref 5–15)
AST SERPL-CCNC: 15 U/L (ref 1–32)
BASOPHILS # BLD AUTO: 0.07 10*3/MM3 (ref 0–0.2)
BASOPHILS NFR BLD AUTO: 0.8 % (ref 0–1.5)
BILIRUB SERPL-MCNC: 0.3 MG/DL (ref 0–1.2)
BUN SERPL-MCNC: 14 MG/DL (ref 6–20)
BUN/CREAT SERPL: 18.7 (ref 7–25)
CALCIUM SPEC-SCNC: 9.8 MG/DL (ref 8.6–10.5)
CHLORIDE SERPL-SCNC: 98 MMOL/L (ref 98–107)
CHOLEST SERPL-MCNC: 138 MG/DL (ref 0–200)
CO2 SERPL-SCNC: 27.6 MMOL/L (ref 22–29)
CREAT SERPL-MCNC: 0.75 MG/DL (ref 0.57–1)
DEPRECATED RDW RBC AUTO: 40.6 FL (ref 37–54)
EOSINOPHIL # BLD AUTO: 0.22 10*3/MM3 (ref 0–0.4)
EOSINOPHIL NFR BLD AUTO: 2.6 % (ref 0.3–6.2)
ERYTHROCYTE [DISTWIDTH] IN BLOOD BY AUTOMATED COUNT: 12.3 % (ref 12.3–15.4)
EXPIRATION DATE: NORMAL
FLUAV AG NPH QL: NEGATIVE
FLUBV AG NPH QL: NEGATIVE
GFR SERPL CREATININE-BSD FRML MDRD: 79 ML/MIN/1.73
GLOBULIN UR ELPH-MCNC: 2.6 GM/DL
GLUCOSE SERPL-MCNC: 103 MG/DL (ref 65–99)
HCT VFR BLD AUTO: 35.6 % (ref 34–46.6)
HDLC SERPL-MCNC: 66 MG/DL (ref 40–60)
HGB BLD-MCNC: 11.8 G/DL (ref 12–15.9)
INTERNAL CONTROL: NORMAL
LDLC SERPL CALC-MCNC: 53 MG/DL (ref 0–100)
LDLC/HDLC SERPL: 0.77 {RATIO}
LYMPHOCYTES # BLD AUTO: 1.46 10*3/MM3 (ref 0.7–3.1)
LYMPHOCYTES NFR BLD AUTO: 17.5 % (ref 19.6–45.3)
Lab: NORMAL
MCH RBC QN AUTO: 30.4 PG (ref 26.6–33)
MCHC RBC AUTO-ENTMCNC: 33.1 G/DL (ref 31.5–35.7)
MCV RBC AUTO: 91.8 FL (ref 79–97)
MONOCYTES # BLD AUTO: 0.75 10*3/MM3 (ref 0.1–0.9)
MONOCYTES NFR BLD AUTO: 9 % (ref 5–12)
NEUTROPHILS NFR BLD AUTO: 5.79 10*3/MM3 (ref 1.7–7)
NEUTROPHILS NFR BLD AUTO: 69.7 % (ref 42.7–76)
PLATELET # BLD AUTO: 242 10*3/MM3 (ref 140–450)
PMV BLD AUTO: 13 FL (ref 6–12)
POTASSIUM SERPL-SCNC: 3.9 MMOL/L (ref 3.5–5.2)
PROT SERPL-MCNC: 7.3 G/DL (ref 6–8.5)
RBC # BLD AUTO: 3.88 10*6/MM3 (ref 3.77–5.28)
SARS-COV-2 ORF1AB RESP QL NAA+PROBE: NOT DETECTED
SODIUM SERPL-SCNC: 135 MMOL/L (ref 136–145)
TRIGL SERPL-MCNC: 105 MG/DL (ref 0–150)
TSH SERPL DL<=0.05 MIU/L-ACNC: 3.83 UIU/ML (ref 0.27–4.2)
VIT B12 BLD-MCNC: 607 PG/ML (ref 211–946)
VLDLC SERPL-MCNC: 19 MG/DL (ref 5–40)
WBC NRBC COR # BLD: 8.32 10*3/MM3 (ref 3.4–10.8)

## 2022-02-10 PROCEDURE — 80061 LIPID PANEL: CPT | Performed by: PREVENTIVE MEDICINE

## 2022-02-10 PROCEDURE — 82607 VITAMIN B-12: CPT | Performed by: PREVENTIVE MEDICINE

## 2022-02-10 PROCEDURE — 87804 INFLUENZA ASSAY W/OPTIC: CPT | Performed by: PREVENTIVE MEDICINE

## 2022-02-10 PROCEDURE — U0004 COV-19 TEST NON-CDC HGH THRU: HCPCS | Performed by: PREVENTIVE MEDICINE

## 2022-02-10 PROCEDURE — 99214 OFFICE O/P EST MOD 30 MIN: CPT | Performed by: PREVENTIVE MEDICINE

## 2022-02-10 PROCEDURE — 85025 COMPLETE CBC W/AUTO DIFF WBC: CPT | Performed by: PREVENTIVE MEDICINE

## 2022-02-10 PROCEDURE — 36415 COLL VENOUS BLD VENIPUNCTURE: CPT | Performed by: PREVENTIVE MEDICINE

## 2022-02-10 PROCEDURE — 82306 VITAMIN D 25 HYDROXY: CPT | Performed by: PREVENTIVE MEDICINE

## 2022-02-10 PROCEDURE — 84443 ASSAY THYROID STIM HORMONE: CPT | Performed by: PREVENTIVE MEDICINE

## 2022-02-10 PROCEDURE — 87081 CULTURE SCREEN ONLY: CPT | Performed by: PREVENTIVE MEDICINE

## 2022-02-10 PROCEDURE — 80053 COMPREHEN METABOLIC PANEL: CPT | Performed by: PREVENTIVE MEDICINE

## 2022-02-10 RX ORDER — FLUCONAZOLE 150 MG/1
TABLET ORAL
Qty: 2 TABLET | Refills: 0 | Status: SHIPPED | OUTPATIENT
Start: 2022-02-10 | End: 2022-03-22

## 2022-02-10 RX ORDER — AMOXICILLIN 875 MG/1
875 TABLET, COATED ORAL 2 TIMES DAILY
Qty: 20 TABLET | Refills: 0 | Status: SHIPPED | OUTPATIENT
Start: 2022-02-10 | End: 2022-02-28

## 2022-02-10 RX ORDER — VENLAFAXINE HYDROCHLORIDE 75 MG/1
75 CAPSULE, EXTENDED RELEASE ORAL DAILY
COMMUNITY
Start: 2022-01-24 | End: 2022-08-15

## 2022-02-10 NOTE — PROGRESS NOTES
"Andre Carlos is a 57 y.o. female presents for   Chief Complaint   Patient presents with   • Cough     with yellow sputum   • Sore Throat     going on 2 weeks   • Nasal Congestion     can't sleep   • Headache   • Hypertension   • Wheezing   Patient presents today for her a recheck on multiple chronic health conditions most of which are stable she was given a green folder for colon cancer screening blood pressure was not under good control she will home monitor and send us the results and then finally she does have a new sore throat with cough and sinus congestion with yellow sputum and nasal discharge negative COVID home test granddaughter is ill with the same so we will do strep influenza a and B and that she is not vaccinated and do a PCR for Covid.  Amoxicillin 875 twice daily for 10 days patient will call back the first of the week if not improved we will get a chest x-ray.  Also she is to rest and drink plenty of fluids and quarantine.    Health Maintenance Due   Topic Date Due   • ZOSTER VACCINE (1 of 2) Never done   • PAP SMEAR  07/26/2019   • INFLUENZA VACCINE  Never done   • COLORECTAL CANCER SCREENING  08/04/2021   • COVID-19 Vaccine (3 - Booster for Moderna series) 09/22/2021       History of Present Illness     Vitals:    02/10/22 0856 02/10/22 0857   BP: 155/96 (!) 167/104   BP Location: Left arm Right arm   Patient Position: Sitting Sitting   Cuff Size: Large Adult Large Adult   Pulse: 68    Temp: 98 °F (36.7 °C)    TempSrc: Temporal    SpO2: 94%    Weight: 76.7 kg (169 lb)    Height: 161.3 cm (63.5\")      Body mass index is 29.47 kg/m².    Current Outpatient Medications on File Prior to Visit   Medication Sig Dispense Refill   • atenolol (TENORMIN) 50 MG tablet Take 1 tablet by mouth Daily. 30 tablet 1   • atorvastatin (LIPITOR) 80 MG tablet Take 1 tablet by mouth Daily. 90 tablet 3   • hydrALAZINE (APRESOLINE) 100 MG tablet TAKE ONE TABLET BY MOUTH THREE TIMES DAILY 90 tablet 1   • " HYDROcodone-acetaminophen (NORCO)  MG per tablet Take 1 tablet by mouth Every 6 (Six) Hours.  0   • levothyroxine (SYNTHROID, LEVOTHROID) 25 MCG tablet TAKE ONE TABLET BY MOUTH EVERY DAY ON EMPTY stomach AT least ONE hour BEFORE eating 90 tablet 3   • tiZANidine (ZANAFLEX) 4 MG tablet Take 4 mg by mouth every night at bedtime.  5   • valACYclovir (VALTREX) 500 MG tablet Take 500 mg by mouth Daily.     • venlafaxine XR (EFFEXOR-XR) 37.5 MG 24 hr capsule TAKE ONE CAPSULE BY MOUTH EVERY DAY (TAKE FOR FOURTEEN DAYS AND THEN MAY INCREASE TO 75 MG EVERY DAY)     • venlafaxine XR (EFFEXOR-XR) 75 MG 24 hr capsule Take 75 mg by mouth Daily.     • Budesonide (ENTOCORT EC) 3 MG 24 hr capsule Take 3 mg TID x 6 weeks 126 capsule 0   • Cholecalciferol (VITAMIN D) 2000 units capsule VITAMIN D 2000 UNIT CAPS     • dicyclomine (BENTYL) 10 MG capsule Take 1 capsule by mouth 4 (Four) Times a Day Before Meals & at Bedtime. 120 capsule 1   • famciclovir (FAMVIR) 500 MG tablet Take 500 mg by mouth Daily.     • ganciclovir (Zirgan) 0.15 % gel ophthalmic gel igtt in affected eye q2h x 2 days then igtt OD TID x 2 weeks     • trifluridine (VIROPTIC) 1 % ophthalmic solution Administer 1 drop to both eyes 3 (Three) Times a Day.  0   • vitamin B-12 (CYANOCOBALAMIN) 1000 MCG tablet Take 2,500 mcg by mouth Daily.     • vitamin C (ASCORBIC ACID) 250 MG tablet Take 1,000 mg by mouth Daily.       No current facility-administered medications on file prior to visit.       The following portions of the patient's history were reviewed and updated as appropriate: allergies, current medications, past family history, past medical history, past social history, past surgical history and problem list.    Review of Systems   Constitutional: Positive for fatigue. Negative for fever.   HENT: Positive for congestion, sinus pressure and sore throat.    Respiratory: Positive for cough and wheezing.    Musculoskeletal: Positive for arthralgias, back pain, gait  problem, myalgias, neck pain and neck stiffness.       Objective   Physical Exam  Vitals reviewed.   Constitutional:       General: She is not in acute distress.     Appearance: Normal appearance. She is well-developed. She is not ill-appearing or toxic-appearing.   HENT:      Head: Normocephalic and atraumatic.      Right Ear: Ear canal and external ear normal.      Left Ear: Tympanic membrane, ear canal and external ear normal.      Ears:      Comments: Decreased light reflex right TM with some diffuse light reflex.     Nose: Nose normal.      Mouth/Throat:      Pharynx: Posterior oropharyngeal erythema present.      Comments: Marked tonsillar enlargement  Eyes:      Extraocular Movements: Extraocular movements intact.      Conjunctiva/sclera: Conjunctivae normal.      Pupils: Pupils are equal, round, and reactive to light.   Cardiovascular:      Rate and Rhythm: Normal rate and regular rhythm.      Heart sounds: Normal heart sounds.   Pulmonary:      Effort: Pulmonary effort is normal.      Comments: Decreased breath sounds bilaterally  Abdominal:      General: Bowel sounds are normal. There is no distension.      Palpations: Abdomen is soft. There is no mass.      Tenderness: There is no abdominal tenderness.   Musculoskeletal:         General: Tenderness present.      Cervical back: Neck supple.   Skin:     General: Skin is warm.   Neurological:      General: No focal deficit present.      Mental Status: She is alert and oriented to person, place, and time.   Psychiatric:         Mood and Affect: Mood normal.         Behavior: Behavior normal.       PHQ-9 Total Score: 0    Assessment/Plan   Diagnoses and all orders for this visit:    1. Primary hypertension (Primary)  Comments:  Not controlled will home monitor  Orders:  -     CBC Auto Differential  -     Comprehensive Metabolic Panel    2. Screening for cervical cancer  Comments:  Staff to get notes from Dr. Jean's office    3. Screening for colon  cancer  Comments:  Green folder for colonoscopy was given  Orders:  -     Ambulatory Referral For Screening Colonoscopy    4. Hyperlipidemia, unspecified hyperlipidemia type  Comments:  Patient trying to eat less saturated fats  Orders:  -     Lipid Panel    5. Disorder of thyroid  Comments:  No increase in dry skin or hair loss  Orders:  -     TSH    6. Cerebrovascular accident (CVA), unspecified mechanism (HCC)  Comments:  No dizziness or other signs of stroke  Orders:  -     Vitamin B12    7. Lymphocytic colitis  Comments:  =Counseled [facility] no recent problems    8. Vitamin D deficiency  Comments:  Takes daily.  Orders:  -     Vitamin D 25 Hydroxy    9. H/O pulmonary emphysema  Comments:  Breathing seems to be under fair control.    10. Sore throat  Comments:  Granddaughter has been ill with same we will check for strep  Orders:  -     Beta Strep Culture, Throat - , Throat; Future  -     Beta Strep Culture, Throat - Swab, Throat    11. Cough  Comments:  Home test for Covid was negative patient has not had flu shot we will test for both.  Patient was also given amoxicillin 875 and then fluconazole for any yeast   Orders:  -     POCT Influenza A/B  -     COVID-19,APTIMA PANTHER(TESFAYE),[facility], NP/OP SWAB IN UTM/VTM/SALINE TRANSPORT MEDIA,24 HR TAT - Swab, Nasopharynx; Future  -     COVID-19,APTIMA PANTHER(TESFAYE),[facility], NP/OP SWAB IN UTM/VTM/SALINE TRANSPORT MEDIA,24 HR TAT - Swab, Nasopharynx    12. Chronic low back pain, unspecified back pain laterality, unspecified whether sciatica present  Comments:  Patient wishes referral to new closer to home pain management doctor.  Orders:  -     Ambulatory Referral to Pain Management    13. Neck pain, chronic  Comments:  Patient wishes to transfer to a new closer to home pain management doctor.  Orders:  -     Ambulatory Referral to Pain Management    Other orders  -     amoxicillin (AMOXIL) 875 MG tablet; Take 1 tablet by mouth 2 (Two) Times a Day.  Dispense: 20  tablet; Refill: 0  -     fluconazole (DIFLUCAN) 150 MG tablet; Take one tablet bh mouth and can repeat I n 3 days  Dispense: 2 tablet; Refill: 0        Patient Instructions     Health Maintenance Due   Topic Date Due   • ZOSTER VACCINE (1 of 2) Never done   • PAP SMEAR  07/26/2019   • INFLUENZA VACCINE  Never done   • COLORECTAL CANCER SCREENING  08/04/2021   • COVID-19 Vaccine (3 - Booster for Moderna series) 09/22/2021     Check blood pressure cuff for accuracy and send 10 blood pressures over 2 weeks.  Watch sodium, alcohol and weight      Take antibiotic till gone, rest and fluids.  Call [facility] to report progress.

## 2022-02-10 NOTE — PROGRESS NOTES
Venipuncture Blood Specimen Collection  Venipuncture performed in left arm by Lauren Neves MA with good hemostasis. Patient tolerated the procedure well without complications.   02/10/22   Lauren Neves MA     Swabbed patient in room for COVID, STREP, AND FLU

## 2022-02-12 ENCOUNTER — TELEPHONE (OUTPATIENT)
Dept: FAMILY MEDICINE CLINIC | Facility: CLINIC | Age: 57
End: 2022-02-12

## 2022-02-12 LAB — BACTERIA SPEC AEROBE CULT: NORMAL

## 2022-02-12 NOTE — PROGRESS NOTES
Flu, strep and Covid negative-if still has symptoms, continue to quarantine and can repeat Covid test first of next week by driving by back of office and calling office and someone will come out and swab.  Glucose 103 so watch carbs and walk.  Mild anemia but is improving, so continue to eat foods rich in iron

## 2022-02-12 NOTE — TELEPHONE ENCOUNTER
HUB TO READ  ----- Message from Isis Franklin MD sent at 2/12/2022  8:34 AM EST -----  Flu, strep and Covid negative-if still has symptoms, continue to quarantine and can repeat Covid test first of next week by driving by back of office and calling office and someone will come out and swab.  Glucose 103 so watch carbs and walk.  Mild anemia but is improving, so continue to eat foods rich in iron

## 2022-02-14 NOTE — TELEPHONE ENCOUNTER
Caller: Melodie Carlos    Relationship to patient: Self    Best call back number: [phone]    Patient is needing: PATIENT CALLED IN AND HUB READ MESSAGE AND PATIENT VOICED UNDERSTANDING

## 2022-02-23 RX ORDER — ATENOLOL 50 MG/1
50 TABLET ORAL DAILY
Qty: 30 TABLET | Refills: 1 | Status: SHIPPED | OUTPATIENT
Start: 2022-02-23 | End: 2022-04-25

## 2022-02-28 ENCOUNTER — OFFICE VISIT (OUTPATIENT)
Dept: FAMILY MEDICINE CLINIC | Facility: CLINIC | Age: 57
End: 2022-02-28

## 2022-02-28 VITALS
DIASTOLIC BLOOD PRESSURE: 91 MMHG | BODY MASS INDEX: 29.09 KG/M2 | TEMPERATURE: 98.9 F | HEIGHT: 64 IN | WEIGHT: 170.4 LBS | OXYGEN SATURATION: 96 % | SYSTOLIC BLOOD PRESSURE: 152 MMHG | HEART RATE: 70 BPM

## 2022-02-28 DIAGNOSIS — J02.9 SORE THROAT: ICD-10-CM

## 2022-02-28 DIAGNOSIS — G47.09 OTHER INSOMNIA: ICD-10-CM

## 2022-02-28 DIAGNOSIS — Z80.0 FAMILY HISTORY OF THROAT CANCER: ICD-10-CM

## 2022-02-28 DIAGNOSIS — R05.9 COUGH: Primary | ICD-10-CM

## 2022-02-28 LAB
EXPIRATION DATE: NORMAL
FLUAV AG NPH QL: NEGATIVE
FLUBV AG NPH QL: NEGATIVE
INTERNAL CONTROL: NORMAL
Lab: NORMAL
SARS-COV-2 ORF1AB RESP QL NAA+PROBE: NOT DETECTED

## 2022-02-28 PROCEDURE — 99214 OFFICE O/P EST MOD 30 MIN: CPT | Performed by: PREVENTIVE MEDICINE

## 2022-02-28 PROCEDURE — U0004 COV-19 TEST NON-CDC HGH THRU: HCPCS | Performed by: PREVENTIVE MEDICINE

## 2022-02-28 PROCEDURE — 87804 INFLUENZA ASSAY W/OPTIC: CPT | Performed by: PREVENTIVE MEDICINE

## 2022-02-28 RX ORDER — CEFDINIR 300 MG/1
300 CAPSULE ORAL 2 TIMES DAILY
Qty: 20 CAPSULE | Refills: 0 | Status: SHIPPED | OUTPATIENT
Start: 2022-02-28 | End: 2022-03-22

## 2022-02-28 NOTE — PROGRESS NOTES
"Andre Carlos is a 57 y.o. female presents for   Chief Complaint   Patient presents with   • Sore Throat   • Fatigue   • Headache   • Cough     yellow sputum   • Chills   • Generalized Body Aches   • Nasal Congestion     Patient presents with sore throat fatigue coughing up of yellow sputum sinus congestion with yellow sputum generalized body aches and chills and sore throat.  Patient has not been exposed to Covid she is aware of granddaughter is ill with a sore throat as well.  Patient is also not sleeping and would like a referral for a sleep study finally several members of her family and had throat cancer so she would like an ear nose and throat evaluation to make sure she has no cancer going on due to the fact that her voice is somewhat gravelly.  Point-of-care flu a and B strep test and Covid test are all pending.  Health Maintenance Due   Topic Date Due   • ZOSTER VACCINE (1 of 2) Never done   • PAP SMEAR  07/26/2019   • INFLUENZA VACCINE  Never done   • COLORECTAL CANCER SCREENING  08/04/2021   • COVID-19 Vaccine (3 - Booster for Moderna series) 09/22/2021       History of Present Illness     Vitals:    02/28/22 0948 02/28/22 0949   BP: 154/85 152/91   BP Location: Right arm Left arm   Patient Position: Sitting Sitting   Cuff Size: Large Adult Large Adult   Pulse: 70    Temp: 98.9 °F (37.2 °C)    TempSrc: Temporal    SpO2: 96%    Weight: 77.3 kg (170 lb 6.4 oz)    Height: 161.3 cm (63.5\")      Body mass index is 29.71 kg/m².    Current Outpatient Medications on File Prior to Visit   Medication Sig Dispense Refill   • atenolol (TENORMIN) 50 MG tablet Take 1 tablet by mouth Daily. 30 tablet 1   • atorvastatin (LIPITOR) 80 MG tablet Take 1 tablet by mouth Daily. 90 tablet 3   • Cholecalciferol (VITAMIN D) 2000 units capsule VITAMIN D 2000 UNIT CAPS     • hydrALAZINE (APRESOLINE) 100 MG tablet TAKE ONE TABLET BY MOUTH THREE TIMES DAILY 90 tablet 1   • HYDROcodone-acetaminophen (NORCO)  MG " per tablet Take 1 tablet by mouth Every 6 (Six) Hours.  0   • levothyroxine (SYNTHROID, LEVOTHROID) 25 MCG tablet TAKE ONE TABLET BY MOUTH EVERY DAY ON EMPTY stomach AT least ONE hour BEFORE eating 90 tablet 3   • tiZANidine (ZANAFLEX) 4 MG tablet Take 4 mg by mouth every night at bedtime.  5   • Budesonide (ENTOCORT EC) 3 MG 24 hr capsule Take 3 mg TID x 6 weeks 126 capsule 0   • dicyclomine (BENTYL) 10 MG capsule Take 1 capsule by mouth 4 (Four) Times a Day Before Meals & at Bedtime. 120 capsule 1   • famciclovir (FAMVIR) 500 MG tablet Take 500 mg by mouth Daily.     • fluconazole (DIFLUCAN) 150 MG tablet Take one tablet bh mouth and can repeat I n 3 days 2 tablet 0   • ganciclovir (Zirgan) 0.15 % gel ophthalmic gel igtt in affected eye q2h x 2 days then igtt OD TID x 2 weeks     • trifluridine (VIROPTIC) 1 % ophthalmic solution Administer 1 drop to both eyes 3 (Three) Times a Day.  0   • valACYclovir (VALTREX) 500 MG tablet Take 500 mg by mouth Daily.     • venlafaxine XR (EFFEXOR-XR) 37.5 MG 24 hr capsule TAKE ONE CAPSULE BY MOUTH EVERY DAY (TAKE FOR FOURTEEN DAYS AND THEN MAY INCREASE TO 75 MG EVERY DAY)     • venlafaxine XR (EFFEXOR-XR) 75 MG 24 hr capsule Take 75 mg by mouth Daily.     • vitamin B-12 (CYANOCOBALAMIN) 1000 MCG tablet Take 2,500 mcg by mouth Daily.     • vitamin C (ASCORBIC ACID) 250 MG tablet Take 1,000 mg by mouth Daily.     • [DISCONTINUED] amoxicillin (AMOXIL) 875 MG tablet Take 1 tablet by mouth 2 (Two) Times a Day. 20 tablet 0     No current facility-administered medications on file prior to visit.       The following portions of the patient's history were reviewed and updated as appropriate: allergies, current medications, past family history, past medical history, past social history, past surgical history and problem list.    Review of Systems   Constitutional: Positive for activity change, chills and fatigue.   HENT: Positive for congestion, sinus pressure and sore throat. Negative  for voice change.    Respiratory: Positive for cough and shortness of breath.    Gastrointestinal: Positive for diarrhea.   Musculoskeletal: Positive for arthralgias and myalgias.   Neurological: Positive for weakness.   Psychiatric/Behavioral: Positive for sleep disturbance.       Objective   Physical Exam  Vitals reviewed.   Constitutional:       General: She is not in acute distress.     Appearance: Normal appearance. She is well-developed. She is not ill-appearing or toxic-appearing.   HENT:      Head: Normocephalic and atraumatic.      Right Ear: Tympanic membrane, ear canal and external ear normal.      Left Ear: Tympanic membrane, ear canal and external ear normal.      Nose: Nose normal.   Eyes:      Extraocular Movements: Extraocular movements intact.      Conjunctiva/sclera: Conjunctivae normal.      Pupils: Pupils are equal, round, and reactive to light.   Cardiovascular:      Rate and Rhythm: Normal rate and regular rhythm.      Heart sounds: Normal heart sounds.   Pulmonary:      Effort: Pulmonary effort is normal.      Comments: Decreased breath sounds bilaterally  Abdominal:      General: Bowel sounds are normal. There is no distension.      Palpations: Abdomen is soft. There is no mass.      Tenderness: There is no abdominal tenderness.   Musculoskeletal:         General: Normal range of motion.      Cervical back: Neck supple.   Skin:     General: Skin is warm.   Neurological:      General: No focal deficit present.      Mental Status: She is alert and oriented to person, place, and time.   Psychiatric:         Mood and Affect: Mood normal.         Behavior: Behavior normal.       PHQ-9 Total Score:      Assessment/Plan   Diagnoses and all orders for this visit:    1. Cough (Primary)  Comments:  Mildly productive cough but it is thick yellow sputum.  Did improve with amoxicillin but after 5 to 7 days it did come back again no fever.  Cefdinir and chest   Orders:  -     COVID-19,APTIMA  PANTHER(TESFAYE), [facility]/ [place], NP/OP SWAB IN UTM/VTM/SALINE TRANSPORT MEDIA,24 HR TAT - Swab, Nasopharynx; Future  -     POCT Influenza A/B  -     XR Chest PA & Lateral  -     COVID-19,APTIMA PANTHER(TESFAYE), [facility]KE/ [place], NP/OP SWAB IN UTM/VTM/SALINE TRANSPORT MEDIA,24 HR TAT - Swab, Nasopharynx    2. Sore throat  Comments:  Granddaughter is sick with a sore throat patient's tonsils are enlarged.  Strep screen pending  Orders:  -     Beta Strep Culture, Throat - , Throat; Future  -     Ambulatory Referral to ENT (Otolaryngology)  -     Beta Strep Culture, Throat - Swab, Throat    3. Family history of throat cancer  Comments:  Patient has always had a gravelly voice no worse recently but she would like a check for throat cancer and that several members of her family have had that.    4. Other insomnia  Comments:  Difficulty sleeping so she has been referred for sleep study.  She will try time-released melatonin in the meantime  Orders:  -     Ambulatory Referral to Sleep Medicine    Other orders  -     cefdinir (OMNICEF) 300 MG capsule; Take 1 capsule by mouth 2 (Two) Times a Day.  Dispense: 20 capsule; Refill: 0        There are no Patient Instructions on file for this visit.

## 2022-03-02 ENCOUNTER — TELEPHONE (OUTPATIENT)
Dept: FAMILY MEDICINE CLINIC | Facility: CLINIC | Age: 57
End: 2022-03-02

## 2022-03-02 LAB — BACTERIA SPEC AEROBE CULT: NORMAL

## 2022-03-02 NOTE — TELEPHONE ENCOUNTER
Throat is feeling better, still hoarse.      HUB TO READ:  ----- Message from Isis Franklin MD sent at 3/2/2022  3:17 PM EST -----  Flu, strep and Covid negative-home she is doing better with antibiotic

## 2022-03-02 NOTE — TELEPHONE ENCOUNTER
THE PATIENT WOULD LIKE THE LAB RESULTS OF HER RECENT COVID, FLU, AND STREP TESTS. PLEASE ADVISE BY CALLING [phone]. THE PATIENT IS NOT FEELING WELL.

## 2022-03-21 NOTE — PROGRESS NOTES
"Chief Complaint  Sleeping Problem    Subjective          Melodie Carlos presents to [facility] NEUROLOGY  History of Present Illness    Pt falls asleep in about 15 min.   Pt has trouble staying sleep, wakes up about 1 or 2, then lays in bed for 2-3 hours then falls asleep for hour or two.    The patient c/o daytime sleepiness issues:  no  There is no history of hypnagogic hallucinations, sleep paralysis or cataplexy.  The patient complains of snoring has dry mouth or sore mouth when he wakes up. Snores maybe in all positions, wakes self up snoring,   The patient complains of Leg symptoms: no. No discomfort in legs when falling asleep  The patient complains of problems with insomnia:restless  Sleep schedule: Bedtime:10 , gets out of bed at varies, sleep latency: 30min, Gets about 4 hours of sleep. Used to sleep all night from 9pm to 5am since she has not  been working having trouble with sleep     EPWORTH SLEEPINESS SCALE, feels tired and has low energy, but does not fall asleep during the day.   Sitting and reading 0 WatchingTV 1  Sitting, inactive, in a public place 0  As a passenger in a car for 1 hour w/o a break  0  Lying down to rest in the afternoon  1  Sitting and talking to someone  0  Sitting quietly after a lunch  0  In a car, while stopped for traffic or a light  0  Total 2    Review of Systems   Constitutional: Positive for appetite change and unexpected weight change.   HENT: Positive for sinus pain.    Eyes: Negative.    Respiratory: Positive for apnea. Negative for cough and shortness of breath.    Cardiovascular: Negative.    Gastrointestinal: Negative.    Endocrine: Negative.    Genitourinary: Negative.    Musculoskeletal: Negative.    Neurological: Negative.    Psychiatric/Behavioral: Negative.      Objective   Vital Signs:   /83   Pulse 67   Temp 97.5 °F (36.4 °C) (Temporal)   Resp 16   Ht 161.3 cm (63.5\")   Wt 78 kg (172 lb)   BMI 29.99 kg/m²     Physical " Exam  Vitals reviewed.   Constitutional:       Appearance: Normal appearance. She is obese.   HENT:      Head: Normocephalic.      Nose: Nose normal.      Mouth/Throat:      Mouth: Mucous membranes are moist.   Eyes:      Conjunctiva/sclera: Conjunctivae normal.      Pupils: Pupils are equal, round, and reactive to light.   Cardiovascular:      Pulses: Normal pulses.   Pulmonary:      Effort: Pulmonary effort is normal. No respiratory distress.   Musculoskeletal:      Cervical back: Normal range of motion.   Skin:     General: Skin is warm.   Neurological:      General: No focal deficit present.      Mental Status: She is alert and oriented to person, place, and time.   Psychiatric:         Mood and Affect: Mood normal.        Result Review :                 Assessment and Plan    Diagnoses and all orders for this visit:    1. Other insomnia (Primary)  -     zolpidem (Ambien) 5 MG tablet; One at hs for sleep test  Dispense: 1 tablet; Refill: 0    2. SANDY (obstructive sleep apnea)    discussed CBT approaches to management of insomnia  Due to difficulty staying a sleep and snoring will obtain hst for possible SANDY , contributing to the insomnia  Will Rx Ambien for the night of the sleep test  Consider low dose doxepin for the insomnia after the hst    Follow Up   Return in about 3 months (around 6/22/2022).  Patient was given instructions and counseling regarding her condition or for health maintenance advice. Please see specific information pulled into the AVS if appropriate.       This document has been electronically signed by Joseph Seipel, MD on March 22, 2022 14:55 EDT

## 2022-03-22 ENCOUNTER — OFFICE VISIT (OUTPATIENT)
Dept: NEUROLOGY | Facility: CLINIC | Age: 57
End: 2022-03-22

## 2022-03-22 VITALS
HEIGHT: 64 IN | SYSTOLIC BLOOD PRESSURE: 144 MMHG | BODY MASS INDEX: 29.37 KG/M2 | HEART RATE: 67 BPM | RESPIRATION RATE: 16 BRPM | TEMPERATURE: 97.5 F | WEIGHT: 172 LBS | DIASTOLIC BLOOD PRESSURE: 83 MMHG

## 2022-03-22 DIAGNOSIS — G47.09 OTHER INSOMNIA: Primary | ICD-10-CM

## 2022-03-22 DIAGNOSIS — G47.33 OSA (OBSTRUCTIVE SLEEP APNEA): ICD-10-CM

## 2022-03-22 PROCEDURE — 99204 OFFICE O/P NEW MOD 45 MIN: CPT | Performed by: PSYCHIATRY & NEUROLOGY

## 2022-03-22 RX ORDER — ZOLPIDEM TARTRATE 5 MG/1
TABLET ORAL
Qty: 1 TABLET | Refills: 0 | Status: SHIPPED | OUTPATIENT
Start: 2022-03-22 | End: 2022-05-10

## 2022-04-13 ENCOUNTER — ON CAMPUS - OUTPATIENT (AMBULATORY)
Dept: URBAN - METROPOLITAN AREA HOSPITAL 2 | Facility: HOSPITAL | Age: 57
End: 2022-04-13
Payer: COMMERCIAL

## 2022-04-13 VITALS
DIASTOLIC BLOOD PRESSURE: 79 MMHG | SYSTOLIC BLOOD PRESSURE: 114 MMHG | DIASTOLIC BLOOD PRESSURE: 75 MMHG | SYSTOLIC BLOOD PRESSURE: 140 MMHG | SYSTOLIC BLOOD PRESSURE: 134 MMHG | HEART RATE: 70 BPM | SYSTOLIC BLOOD PRESSURE: 110 MMHG | HEIGHT: 64 IN | HEART RATE: 119 BPM | HEART RATE: 106 BPM | DIASTOLIC BLOOD PRESSURE: 72 MMHG | HEART RATE: 94 BPM | RESPIRATION RATE: 17 BRPM | HEART RATE: 107 BPM | DIASTOLIC BLOOD PRESSURE: 54 MMHG | OXYGEN SATURATION: 96 % | SYSTOLIC BLOOD PRESSURE: 149 MMHG | WEIGHT: 165 LBS | RESPIRATION RATE: 16 BRPM | SYSTOLIC BLOOD PRESSURE: 132 MMHG | RESPIRATION RATE: 18 BRPM | OXYGEN SATURATION: 97 % | DIASTOLIC BLOOD PRESSURE: 90 MMHG | OXYGEN SATURATION: 98 % | TEMPERATURE: 98 F | DIASTOLIC BLOOD PRESSURE: 91 MMHG | SYSTOLIC BLOOD PRESSURE: 123 MMHG | HEART RATE: 96 BPM | HEART RATE: 102 BPM | DIASTOLIC BLOOD PRESSURE: 78 MMHG | DIASTOLIC BLOOD PRESSURE: 61 MMHG | HEART RATE: 90 BPM | SYSTOLIC BLOOD PRESSURE: 124 MMHG

## 2022-04-13 DIAGNOSIS — K64.0 FIRST DEGREE HEMORRHOIDS: ICD-10-CM

## 2022-04-13 DIAGNOSIS — K57.30 DIVERTICULOSIS OF LARGE INTESTINE WITHOUT PERFORATION OR ABS: ICD-10-CM

## 2022-04-13 DIAGNOSIS — Z86.010 PERSONAL HISTORY OF COLONIC POLYPS: ICD-10-CM

## 2022-04-13 DIAGNOSIS — K62.5 HEMORRHAGE OF ANUS AND RECTUM: ICD-10-CM

## 2022-04-13 PROCEDURE — 45378 DIAGNOSTIC COLONOSCOPY: CPT | Performed by: INTERNAL MEDICINE

## 2022-04-25 RX ORDER — ATENOLOL 50 MG/1
50 TABLET ORAL DAILY
Qty: 30 TABLET | Refills: 1 | Status: SHIPPED | OUTPATIENT
Start: 2022-04-25 | End: 2022-07-11

## 2022-05-09 NOTE — PATIENT INSTRUCTIONS
Health Maintenance Due   Topic Date Due    ZOSTER VACCINE (1 of 2) Never done    PAP SMEAR  07/26/2019    COVID-19 Vaccine (3 - Booster for Moderna series) 09/22/2021    Check blood pressure cuff for accuracy and send 10 blood pressures over 2 weeks.  Watch sodium, alcohol and weight

## 2022-05-10 ENCOUNTER — OFFICE VISIT (OUTPATIENT)
Dept: FAMILY MEDICINE CLINIC | Facility: CLINIC | Age: 57
End: 2022-05-10

## 2022-05-10 VITALS
WEIGHT: 170.6 LBS | OXYGEN SATURATION: 96 % | HEIGHT: 64 IN | SYSTOLIC BLOOD PRESSURE: 145 MMHG | DIASTOLIC BLOOD PRESSURE: 88 MMHG | BODY MASS INDEX: 29.12 KG/M2 | TEMPERATURE: 97.9 F | HEART RATE: 71 BPM

## 2022-05-10 DIAGNOSIS — Z87.09 H/O PULMONARY EMPHYSEMA: ICD-10-CM

## 2022-05-10 DIAGNOSIS — I10 PRIMARY HYPERTENSION: Primary | ICD-10-CM

## 2022-05-10 DIAGNOSIS — R31.9 HEMATURIA, UNSPECIFIED TYPE: ICD-10-CM

## 2022-05-10 DIAGNOSIS — R05.9 COUGH: ICD-10-CM

## 2022-05-10 DIAGNOSIS — N76.1 SUBACUTE VAGINITIS: ICD-10-CM

## 2022-05-10 DIAGNOSIS — G89.29 CHRONIC LEFT SI JOINT PAIN: ICD-10-CM

## 2022-05-10 DIAGNOSIS — E78.5 HYPERLIPIDEMIA, UNSPECIFIED HYPERLIPIDEMIA TYPE: ICD-10-CM

## 2022-05-10 DIAGNOSIS — M53.3 CHRONIC LEFT SI JOINT PAIN: ICD-10-CM

## 2022-05-10 DIAGNOSIS — D64.9 ANEMIA, UNSPECIFIED TYPE: ICD-10-CM

## 2022-05-10 DIAGNOSIS — E07.9 DISORDER OF THYROID: ICD-10-CM

## 2022-05-10 DIAGNOSIS — K52.832 LYMPHOCYTIC COLITIS: ICD-10-CM

## 2022-05-10 DIAGNOSIS — Z12.4 SCREENING FOR CERVICAL CANCER: ICD-10-CM

## 2022-05-10 LAB
BASOPHILS # BLD AUTO: 0.06 10*3/MM3 (ref 0–0.2)
BASOPHILS NFR BLD AUTO: 0.9 % (ref 0–1.5)
BILIRUB BLD-MCNC: NEGATIVE MG/DL
CLARITY, POC: CLEAR
COLOR UR: YELLOW
DEPRECATED RDW RBC AUTO: 44.3 FL (ref 37–54)
EOSINOPHIL # BLD AUTO: 0.22 10*3/MM3 (ref 0–0.4)
EOSINOPHIL NFR BLD AUTO: 3.2 % (ref 0.3–6.2)
ERYTHROCYTE [DISTWIDTH] IN BLOOD BY AUTOMATED COUNT: 13.1 % (ref 12.3–15.4)
EXPIRATION DATE: NORMAL
GLUCOSE UR STRIP-MCNC: NEGATIVE MG/DL
HCT VFR BLD AUTO: 36.8 % (ref 34–46.6)
HGB BLD-MCNC: 12 G/DL (ref 12–15.9)
KETONES UR QL: NEGATIVE
LEUKOCYTE EST, POC: NEGATIVE
LYMPHOCYTES # BLD AUTO: 1.13 10*3/MM3 (ref 0.7–3.1)
LYMPHOCYTES NFR BLD AUTO: 16.6 % (ref 19.6–45.3)
Lab: NORMAL
MCH RBC QN AUTO: 30 PG (ref 26.6–33)
MCHC RBC AUTO-ENTMCNC: 32.6 G/DL (ref 31.5–35.7)
MCV RBC AUTO: 92 FL (ref 79–97)
MONOCYTES # BLD AUTO: 0.51 10*3/MM3 (ref 0.1–0.9)
MONOCYTES NFR BLD AUTO: 7.5 % (ref 5–12)
NEUTROPHILS NFR BLD AUTO: 4.89 10*3/MM3 (ref 1.7–7)
NEUTROPHILS NFR BLD AUTO: 71.7 % (ref 42.7–76)
NITRITE UR-MCNC: NEGATIVE MG/ML
PH UR: 5.5 [PH] (ref 5–8)
PLATELET # BLD AUTO: 238 10*3/MM3 (ref 140–450)
PMV BLD AUTO: 13.3 FL (ref 6–12)
PROT UR STRIP-MCNC: NEGATIVE MG/DL
RBC # BLD AUTO: 4 10*6/MM3 (ref 3.77–5.28)
RBC # UR STRIP: NEGATIVE /UL
SP GR UR: 1.02 (ref 1–1.03)
TSH SERPL DL<=0.05 MIU/L-ACNC: 0.02 UIU/ML (ref 0.27–4.2)
UROBILINOGEN UR QL: NORMAL
VIT B12 BLD-MCNC: 482 PG/ML (ref 211–946)
WBC NRBC COR # BLD: 6.82 10*3/MM3 (ref 3.4–10.8)

## 2022-05-10 PROCEDURE — 99214 OFFICE O/P EST MOD 30 MIN: CPT | Performed by: PREVENTIVE MEDICINE

## 2022-05-10 PROCEDURE — 83735 ASSAY OF MAGNESIUM: CPT | Performed by: PREVENTIVE MEDICINE

## 2022-05-10 PROCEDURE — 80061 LIPID PANEL: CPT | Performed by: PREVENTIVE MEDICINE

## 2022-05-10 PROCEDURE — 80050 GENERAL HEALTH PANEL: CPT | Performed by: PREVENTIVE MEDICINE

## 2022-05-10 PROCEDURE — 82607 VITAMIN B-12: CPT | Performed by: PREVENTIVE MEDICINE

## 2022-05-10 RX ORDER — HYDRALAZINE HYDROCHLORIDE 100 MG/1
TABLET, FILM COATED ORAL
Qty: 90 TABLET | Refills: 1 | Status: SHIPPED | OUTPATIENT
Start: 2022-05-10 | End: 2022-09-27

## 2022-05-10 RX ORDER — FLUCONAZOLE 150 MG/1
150 TABLET ORAL ONCE
Qty: 1 TABLET | Refills: 0 | Status: SHIPPED | OUTPATIENT
Start: 2022-05-10 | End: 2022-05-10

## 2022-05-10 NOTE — PROGRESS NOTES
"Andre Carlos is a 57 y.o. female presents for   Chief Complaint   Patient presents with   • Hypertension     3 month check up    • Hyperlipidemia     3 month check up    • Back Pain     Patient presents today for follow-up of multiple chronic health conditions most of which are stable her blood pressure however was not under good control so she will home monitor and send us the results.  She has not noticed any blood in her urine has noticed some vaginal irritation that did not respond to yogurt that was given after 3 rounds of antibiotics for her sinus infection.  She is due to have some sinus surgery soon however we will get a chest x-ray and that she is coughing up thick sputum without fever to make sure that the chest is not infected new problem 3 is chronic left SI joint pain been going on for 6 months she is to see the pain management doctor and they get this area injected but she has had no imaging of that area in 3 to 4 years so we will get sacroiliac evaluation today.  Health Maintenance Due   Topic Date Due   • ZOSTER VACCINE (1 of 2) Never done   • PAP SMEAR  07/26/2019   • COVID-19 Vaccine (3 - Booster for Moderna series) 09/22/2021       History of Present Illness     Vitals:    05/10/22 0902 05/10/22 0903   BP: 135/80 145/88   BP Location: Left arm Right arm   Patient Position: Sitting Sitting   Cuff Size: Adult Adult   Pulse: 71    Temp: 97.9 °F (36.6 °C)    TempSrc: Temporal    SpO2: 96%    Weight: 77.4 kg (170 lb 9.6 oz)    Height: 161.3 cm (63.5\")      Body mass index is 29.75 kg/m².    Current Outpatient Medications on File Prior to Visit   Medication Sig Dispense Refill   • atenolol (TENORMIN) 50 MG tablet Take 1 tablet by mouth Daily. 30 tablet 1   • atorvastatin (LIPITOR) 80 MG tablet Take 1 tablet by mouth Daily. 90 tablet 3   • hydrALAZINE (APRESOLINE) 100 MG tablet TAKE ONE TABLET BY MOUTH THREE TIMES DAILY 90 tablet 1   • HYDROcodone-acetaminophen (NORCO)  MG per " tablet Take 1 tablet by mouth Every 6 (Six) Hours.  0   • levothyroxine (SYNTHROID, LEVOTHROID) 25 MCG tablet TAKE ONE TABLET BY MOUTH EVERY DAY ON EMPTY stomach AT least ONE hour BEFORE eating 90 tablet 3   • tiZANidine (ZANAFLEX) 4 MG tablet Take 4 mg by mouth every night at bedtime.  5   • venlafaxine XR (EFFEXOR-XR) 37.5 MG 24 hr capsule TAKE ONE CAPSULE BY MOUTH EVERY DAY (TAKE FOR FOURTEEN DAYS AND THEN MAY INCREASE TO 75 MG EVERY DAY)     • venlafaxine XR (EFFEXOR-XR) 75 MG 24 hr capsule Take 75 mg by mouth Daily.     • [DISCONTINUED] trifluridine (VIROPTIC) 1 % ophthalmic solution Administer 1 drop to both eyes 3 (Three) Times a Day.  0   • [DISCONTINUED] zolpidem (Ambien) 5 MG tablet One at hs for sleep test 1 tablet 0     No current facility-administered medications on file prior to visit.       The following portions of the patient's history were reviewed and updated as appropriate: allergies, current medications, past family history, past medical history, past social history, past surgical history and problem list.    Review of Systems   HENT: Positive for congestion, hearing loss, postnasal drip and voice change.    Respiratory: Positive for cough.    Genitourinary: Positive for vaginal discharge.   Musculoskeletal: Positive for back pain, gait problem and myalgias.       Objective   Physical Exam  Vitals reviewed.   Constitutional:       General: She is not in acute distress.     Appearance: Normal appearance. She is well-developed. She is not ill-appearing or toxic-appearing.   HENT:      Head: Normocephalic and atraumatic.      Right Ear: Tympanic membrane, ear canal and external ear normal.      Left Ear: Tympanic membrane, ear canal and external ear normal.      Nose: Nose normal.      Mouth/Throat:      Mouth: Mucous membranes are moist.      Pharynx: No posterior oropharyngeal erythema.   Eyes:      Extraocular Movements: Extraocular movements intact.      Conjunctiva/sclera: Conjunctivae normal.       Pupils: Pupils are equal, round, and reactive to light.   Cardiovascular:      Rate and Rhythm: Normal rate and regular rhythm.      Heart sounds: Normal heart sounds.   Pulmonary:      Effort: Pulmonary effort is normal.      Comments: Decreased breath sounds bilaterally no rales few scattered wheezes.  Abdominal:      General: Bowel sounds are normal. There is no distension.      Palpations: Abdomen is soft. There is no mass.      Tenderness: There is no abdominal tenderness.   Musculoskeletal:      Cervical back: Neck supple.      Comments: Tenderness over the left SI joint she does have 75% forward flexion and left lateral flexion toe heel walk and squat was strong straight leg raising was negative there was no pain with hip extension leg sense and adduction was full knee and ankle reflexes were 2+ and equal bilaterally.   Skin:     General: Skin is warm.   Neurological:      General: No focal deficit present.      Mental Status: She is alert and oriented to person, place, and time.   Psychiatric:         Mood and Affect: Mood normal.         Behavior: Behavior normal.       PHQ-9 Total Score:      [unfilled]  Diagnoses and all orders for this visit:    1. Primary hypertension (Primary)  Comments:  Not controlled patient will home monitor and send us the results.  Orders:  -     CBC Auto Differential  -     Comprehensive Metabolic Panel    2. Screening for cervical cancer  Comments:  done 12/21    3. Lymphocytic colitis  Comments:  Much improved after EGD.  Orders:  -     CBC Auto Differential  -     Magnesium    4. Hyperlipidemia, unspecified hyperlipidemia type  Comments:  Patient is not really watching her saturated fats.  We have encouraged her to do so.  Orders:  -     Lipid Panel    5. H/O pulmonary emphysema  Comments:  Breathing improved with new inhalers.    6. Disorder of thyroid  Comments:  No increase in dry skin or hair loss.  Orders:  -     TSH    7. Hematuria, unspecified  type  Comments:  Patient has not noted any blood in her urine.  Orders:  -     POC Urinalysis Dipstick, Automated    8. Anemia, unspecified type  Comments:  No blood loss in urine bowels heartburn or vagina.  Orders:  -     Vitamin B12    9. Chronic left SI joint pain  Comments:  6-month history of left SI discomfort.  Will be seeing the pain management for possible reinjection imaging ordered.  Orders:  -     Cancel: XR Sacroiliac Joints 3+ View; Future  -     XR Sacroiliac Joints 3+ View; Future    10. Subacute vaginitis  Comments:  3 rounds of antibiotics did seem to cause some vaginitis which has improved somewhat with yogurt but we did give her 1 Diflucan 150 pill to take as well.    11. Cough  Comments:  Cough she feels is probably due to postnasal discharge from the sinus infection.  She will get chest x-ray exam today was negative May need new antibiotic.  Orders:  -     XR Chest PA & Lateral; Future    Other orders  -     fluconazole (DIFLUCAN) 150 MG tablet; Take 1 tablet by mouth 1 (One) Time for 1 dose.  Dispense: 1 tablet; Refill: 0  -     Pneumococcal Conjugate Vaccine 20-Valent (PCV20)        Patient Instructions     Health Maintenance Due   Topic Date Due   • ZOSTER VACCINE (1 of 2) Never done   • PAP SMEAR  07/26/2019   • COVID-19 Vaccine (3 - Booster for Moderna series) 09/22/2021    Check blood pressure cuff for accuracy and send 10 blood pressures over 2 weeks.  Watch sodium, alcohol and weight

## 2022-05-11 ENCOUNTER — TELEPHONE (OUTPATIENT)
Dept: FAMILY MEDICINE CLINIC | Facility: CLINIC | Age: 57
End: 2022-05-11

## 2022-05-11 DIAGNOSIS — E03.9 HYPOTHYROIDISM, UNSPECIFIED TYPE: Primary | ICD-10-CM

## 2022-05-11 LAB
ALBUMIN SERPL-MCNC: 4.8 G/DL (ref 3.5–5.2)
ALBUMIN/GLOB SERPL: 1.8 G/DL
ALP SERPL-CCNC: 82 U/L (ref 39–117)
ALT SERPL W P-5'-P-CCNC: 27 U/L (ref 1–33)
ANION GAP SERPL CALCULATED.3IONS-SCNC: 21 MMOL/L (ref 5–15)
AST SERPL-CCNC: 34 U/L (ref 1–32)
BILIRUB SERPL-MCNC: 0.5 MG/DL (ref 0–1.2)
BUN SERPL-MCNC: 16 MG/DL (ref 6–20)
BUN/CREAT SERPL: 19.8 (ref 7–25)
CALCIUM SPEC-SCNC: 9.7 MG/DL (ref 8.6–10.5)
CHLORIDE SERPL-SCNC: 102 MMOL/L (ref 98–107)
CHOLEST SERPL-MCNC: 158 MG/DL (ref 0–200)
CO2 SERPL-SCNC: 17 MMOL/L (ref 22–29)
CREAT SERPL-MCNC: 0.81 MG/DL (ref 0.57–1)
EGFRCR SERPLBLD CKD-EPI 2021: 83.7 ML/MIN/1.73
GLOBULIN UR ELPH-MCNC: 2.6 GM/DL
GLUCOSE SERPL-MCNC: 88 MG/DL (ref 65–99)
HDLC SERPL-MCNC: 67 MG/DL (ref 40–60)
LDLC SERPL CALC-MCNC: 74 MG/DL (ref 0–100)
LDLC/HDLC SERPL: 1.07 {RATIO}
MAGNESIUM SERPL-MCNC: 2.2 MG/DL (ref 1.6–2.6)
POTASSIUM SERPL-SCNC: 4.3 MMOL/L (ref 3.5–5.2)
PROT SERPL-MCNC: 7.4 G/DL (ref 6–8.5)
SODIUM SERPL-SCNC: 140 MMOL/L (ref 136–145)
TRIGL SERPL-MCNC: 96 MG/DL (ref 0–150)
VLDLC SERPL-MCNC: 17 MG/DL (ref 5–40)

## 2022-05-11 NOTE — TELEPHONE ENCOUNTER
HUB TO READ:  ----- Message from Isis Franklin MD sent at 5/11/2022 12:10 PM EDT -----  Bad cholesterol 74 and goal is below 70-if she has done all she can with diet and exercise-should add Zetia-let me know.  Thyroid medicine looks too high-let's stop it and recheck in 4 weeks with T4 as well-can be done nonfasting at office.  Finall, Vitamin B12 is slightly low so increase 1000 units one day/week.

## 2022-05-11 NOTE — PROGRESS NOTES
Bad cholesterol 74 and goal is below 70-if she has done all she can with diet and exercise-should add Zetia-let me know.  Thyroid medicine looks too high-let's stop it and recheck in 4 weeks with T4 as well-can be done nonfasting at office.  Finall, Vitamin B12 is slightly low so increase 1000 units one day/week.

## 2022-05-12 RX ORDER — EZETIMIBE 10 MG/1
10 TABLET ORAL DAILY
Qty: 90 TABLET | Refills: 3 | Status: SHIPPED | OUTPATIENT
Start: 2022-05-12 | End: 2022-08-26 | Stop reason: SDUPTHER

## 2022-05-13 ENCOUNTER — TELEPHONE (OUTPATIENT)
Dept: FAMILY MEDICINE CLINIC | Facility: CLINIC | Age: 57
End: 2022-05-13

## 2022-05-13 NOTE — TELEPHONE ENCOUNTER
HUB TO READ:  ----- Message from Isis Franklin MD sent at 5/13/2022  6:50 AM EDT -----  Chest x-ray from [facility] did not show any signs of pneumonia enlarged heart there was some calcifications around the big tube bleeding from the heart called the aorta.  We need to watch cholesterol and it is good that you have stop smoking.  Call if any questions or concerns.

## 2022-05-13 NOTE — PROGRESS NOTES
Chest x-ray from [facility] did not show any signs of pneumonia enlarged heart there was some calcifications around the big tube bleeding from the heart called the aorta.  We need to watch cholesterol and it is good that you have stop smoking.  Call if any questions or concerns.

## 2022-05-16 ENCOUNTER — TELEPHONE (OUTPATIENT)
Dept: FAMILY MEDICINE CLINIC | Facility: CLINIC | Age: 57
End: 2022-05-16

## 2022-05-16 NOTE — TELEPHONE ENCOUNTER
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  She does have an area that is light on the right side of the iliac area which may be due to a bone abnormality versus a superimposed artifact from her intestines.  Would advise we consider repeating the SI joint films if she is having discomfort on the right side of her pelvis.  Please call and let us know if she has any right-sided low back pain

## 2022-05-16 NOTE — PROGRESS NOTES
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  She does have an area that is light on the right side of the iliac area which may be due to a bone abnormality versus a superimposed artifact from her intestines.  Would advise we consider repeating the SI joint films if she is having discomfort on the right side of her pelvis.  Please call and let us know if she has any right-sided low back pain

## 2022-05-19 ENCOUNTER — TELEPHONE (OUTPATIENT)
Dept: FAMILY MEDICINE CLINIC | Facility: CLINIC | Age: 57
End: 2022-05-19

## 2022-05-19 NOTE — TELEPHONE ENCOUNTER
HUB TO READ:  ----- Message from Isis Franklin MD sent at 5/19/2022  4:17 PM EDT -----  Last Pap was from 2011 and it was negative.  If patient has had an abnormal Pap prior to her hysterectomy then she needs to check with OB/GYN [facility] to see if that needs to be continued to be repeated.  If hysterectomy was done for bleeding her endometrial trio cyst then she does not need any follow-up Pap smear

## 2022-06-08 ENCOUNTER — CLINICAL SUPPORT (OUTPATIENT)
Dept: FAMILY MEDICINE CLINIC | Facility: CLINIC | Age: 57
End: 2022-06-08

## 2022-06-08 DIAGNOSIS — E03.9 HYPOTHYROIDISM, UNSPECIFIED TYPE: ICD-10-CM

## 2022-06-08 LAB
T4 FREE SERPL-MCNC: 0.82 NG/DL (ref 0.93–1.7)
TSH SERPL DL<=0.05 MIU/L-ACNC: 1.68 UIU/ML (ref 0.27–4.2)

## 2022-06-08 PROCEDURE — 36415 COLL VENOUS BLD VENIPUNCTURE: CPT | Performed by: PREVENTIVE MEDICINE

## 2022-06-08 PROCEDURE — 84443 ASSAY THYROID STIM HORMONE: CPT | Performed by: PREVENTIVE MEDICINE

## 2022-06-08 PROCEDURE — 84439 ASSAY OF FREE THYROXINE: CPT | Performed by: PREVENTIVE MEDICINE

## 2022-06-08 NOTE — PROGRESS NOTES
Venipuncture Blood Specimen Collection  Venipuncture performed in left arm by Lauren Neves MA with good hemostasis. Patient tolerated the procedure well without complications.   06/08/22   Lauren Neves MA

## 2022-06-09 ENCOUNTER — TELEPHONE (OUTPATIENT)
Dept: FAMILY MEDICINE CLINIC | Facility: CLINIC | Age: 57
End: 2022-06-09

## 2022-06-09 NOTE — PROGRESS NOTES
TSH is now normal and free T4 is only minimally low.  She can get a repeat again in a month or she can wait till she does her labs in August please let me know.

## 2022-06-09 NOTE — TELEPHONE ENCOUNTER
HUB TO READ:  ----- Message from Isis Franklin MD sent at 6/9/2022  7:01 AM EDT -----  TSH is now normal and free T4 is only minimally low.  She can get a repeat again in a month or she can wait till she does her labs in August please let me know.

## 2022-07-11 RX ORDER — ATENOLOL 50 MG/1
TABLET ORAL
Qty: 30 TABLET | Refills: 1 | Status: SHIPPED | OUTPATIENT
Start: 2022-07-11 | End: 2022-09-06

## 2022-08-09 NOTE — PATIENT INSTRUCTIONS
10/21/19  , hx  x2  Normal anatomy Health Maintenance Due   Topic Date Due    ZOSTER VACCINE (1 of 2) Never done    PAP SMEAR  07/26/2019    COVID-19 Vaccine (3 - Booster for Moderna series) 09/22/2021    MAMMOGRAM  06/21/2022    ANNUAL PHYSICAL  06/22/2022    LUNG CANCER SCREENING  06/28/2022    12 hour fast for labs    Check blood pressure cuff for accuracy and send 10 blood pressures over 2 weeks.  Watch sodium, alcohol and weight

## 2022-08-15 ENCOUNTER — OFFICE VISIT (OUTPATIENT)
Dept: FAMILY MEDICINE CLINIC | Facility: CLINIC | Age: 57
End: 2022-08-15

## 2022-08-15 VITALS
SYSTOLIC BLOOD PRESSURE: 124 MMHG | OXYGEN SATURATION: 94 % | WEIGHT: 170.8 LBS | DIASTOLIC BLOOD PRESSURE: 79 MMHG | TEMPERATURE: 97.8 F | HEART RATE: 65 BPM | BODY MASS INDEX: 29.16 KG/M2 | HEIGHT: 64 IN

## 2022-08-15 DIAGNOSIS — E07.9 DISORDER OF THYROID: ICD-10-CM

## 2022-08-15 DIAGNOSIS — Z12.4 SCREENING FOR CERVICAL CANCER: ICD-10-CM

## 2022-08-15 DIAGNOSIS — G47.09 OTHER INSOMNIA: ICD-10-CM

## 2022-08-15 DIAGNOSIS — I10 PRIMARY HYPERTENSION: ICD-10-CM

## 2022-08-15 DIAGNOSIS — E78.5 HYPERLIPIDEMIA, UNSPECIFIED HYPERLIPIDEMIA TYPE: ICD-10-CM

## 2022-08-15 DIAGNOSIS — Z12.2 ENCOUNTER FOR SCREENING FOR LUNG CANCER: ICD-10-CM

## 2022-08-15 DIAGNOSIS — Z87.09 H/O PULMONARY EMPHYSEMA: ICD-10-CM

## 2022-08-15 DIAGNOSIS — Z00.01 ENCOUNTER FOR ANNUAL GENERAL MEDICAL EXAMINATION WITH ABNORMAL FINDINGS IN ADULT: Primary | ICD-10-CM

## 2022-08-15 DIAGNOSIS — Z12.31 ENCOUNTER FOR SCREENING MAMMOGRAM FOR MALIGNANT NEOPLASM OF BREAST: ICD-10-CM

## 2022-08-15 DIAGNOSIS — R01.1 HEART MURMUR: ICD-10-CM

## 2022-08-15 DIAGNOSIS — N76.1 SUBACUTE VAGINITIS: ICD-10-CM

## 2022-08-15 DIAGNOSIS — K52.832 LYMPHOCYTIC COLITIS: ICD-10-CM

## 2022-08-15 PROCEDURE — 99396 PREV VISIT EST AGE 40-64: CPT | Performed by: PREVENTIVE MEDICINE

## 2022-08-15 PROCEDURE — 99213 OFFICE O/P EST LOW 20 MIN: CPT | Performed by: PREVENTIVE MEDICINE

## 2022-08-15 NOTE — PROGRESS NOTES
"Andre Carlos is a 57 y.o. female presents for   Chief Complaint   Patient presents with   • Annual Exam     Patient is not fasting     Patient presents today for annual wellness exam and has been advised to wear sunscreen and a seatbelt.  She has had her mammogram done so staff will get those results she has been encouraged to follow-up with her gynecologist for cervical cancer screening and we have referred her for lung cancer screening.  Blood pressure today was not under good control so she will home monitor and send us the results.  She is also complaining of insomnia and will try the time-released melatonin and if that does not seem to work she will be referred to the sleep doctor.  Finally a new heart murmur was auscultated today it was grade 2 over the left sternal border and we have ordered an echocardiogram she has had no chest pain shortness of breath or recent sore throat.  Health Maintenance Due   Topic Date Due   • ZOSTER VACCINE (1 of 2) Never done   • PAP SMEAR  07/26/2019   • COVID-19 Vaccine (3 - Booster for Moderna series) 09/22/2021   • MAMMOGRAM  06/21/2022   • ANNUAL PHYSICAL  06/22/2022   • LUNG CANCER SCREENING  06/28/2022       History of Present Illness     Vitals:    08/15/22 1314 08/15/22 1316   BP: 150/80 124/79   BP Location: Right arm Left arm   Patient Position: Sitting Sitting   Cuff Size: Adult Adult   Pulse: 65    Temp: 97.8 °F (36.6 °C)    TempSrc: Temporal    SpO2: 94%    Weight: 77.5 kg (170 lb 12.8 oz)    Height: 161.3 cm (63.5\")      Body mass index is 29.78 kg/m².    Current Outpatient Medications on File Prior to Visit   Medication Sig Dispense Refill   • atenolol (TENORMIN) 50 MG tablet TAKE ONE TABLET BY MOUTH DAILY 30 tablet 1   • ezetimibe (Zetia) 10 MG tablet Take 1 tablet by mouth Daily. 90 tablet 3   • hydrALAZINE (APRESOLINE) 100 MG tablet TAKE ONE TABLET BY MOUTH THREE TIMES DAILY 90 tablet 1   • HYDROcodone-acetaminophen (NORCO)  MG per " tablet Take 1 tablet by mouth Every 6 (Six) Hours.  0   • tiZANidine (ZANAFLEX) 4 MG tablet Take 4 mg by mouth every night at bedtime.  5   • [DISCONTINUED] atorvastatin (LIPITOR) 80 MG tablet Take 1 tablet by mouth Daily. 90 tablet 3   • [DISCONTINUED] venlafaxine XR (EFFEXOR-XR) 37.5 MG 24 hr capsule TAKE ONE CAPSULE BY MOUTH EVERY DAY (TAKE FOR FOURTEEN DAYS AND THEN MAY INCREASE TO 75 MG EVERY DAY)     • [DISCONTINUED] venlafaxine XR (EFFEXOR-XR) 75 MG 24 hr capsule Take 75 mg by mouth Daily.       No current facility-administered medications on file prior to visit.       The following portions of the patient's history were reviewed and updated as appropriate: allergies, current medications, past family history, past medical history, past social history, past surgical history and problem list.    Review of Systems   Respiratory: Positive for shortness of breath and wheezing.    Cardiovascular: Negative for chest pain and palpitations.       Objective   Physical Exam  Vitals reviewed.   Constitutional:       General: She is not in acute distress.     Appearance: Normal appearance. She is well-developed. She is not ill-appearing or toxic-appearing.   HENT:      Head: Normocephalic and atraumatic.      Right Ear: Tympanic membrane, ear canal and external ear normal.      Left Ear: Tympanic membrane, ear canal and external ear normal.      Nose: Nose normal.      Mouth/Throat:      Mouth: Mucous membranes are moist.      Pharynx: No posterior oropharyngeal erythema.   Eyes:      Extraocular Movements: Extraocular movements intact.      Conjunctiva/sclera: Conjunctivae normal.      Pupils: Pupils are equal, round, and reactive to light.   Cardiovascular:      Rate and Rhythm: Normal rate and regular rhythm.      Heart sounds: Normal heart sounds.   Pulmonary:      Effort: Pulmonary effort is normal.      Comments: Decreased breath sounds bilaterally  Abdominal:      General: Bowel sounds are normal. There is no  distension.      Palpations: Abdomen is soft. There is no mass.      Tenderness: There is no abdominal tenderness.   Musculoskeletal:         General: Normal range of motion.      Cervical back: Neck supple.   Skin:     General: Skin is warm.   Neurological:      General: No focal deficit present.      Mental Status: She is alert and oriented to person, place, and time.   Psychiatric:         Mood and Affect: Mood normal.         Behavior: Behavior normal.       PHQ-9 Total Score:      Assessment & Plan   Diagnoses and all orders for this visit:    1. Encounter for annual general medical examination with abnormal findings in adult (Primary)  Comments:  Patient has been advised to wear sunscreen and a seatbelt    2. Encounter for screening for lung cancer  Comments:  Pros and cons of low-dose lung cancer screening ordered again described and she has chosen to have a screening  Orders:  -      CT Chest Low Dose Cancer Screening WO; Future    3. Encounter for screening mammogram for malignant neoplasm of breast  Comments:  Patient states that [facility] will be instructed to get this.    4. Screening for cervical cancer  Comments:  Patient has been advised to set up an appointment with her gynecologist for Pap smear  Orders:  -     Ambulatory Referral to Gynecology    5. Subacute vaginitis  Comments:  No admission to any vaginal discomfort or discharge    6. Lymphocytic colitis  Comments:  No complaints of diarrhea or cramping expressed today    7. Primary hypertension  Comments:  Blood pressures not under good control patient will home monitor and send us the results  Orders:  -     Comprehensive Metabolic Panel  -     CBC Auto Differential    8. Hyperlipidemia, unspecified hyperlipidemia type  Comments:  Patient is trying to watch her saturated fats and increase her walking  Orders:  -     Lipid Panel    9. H/O pulmonary emphysema  Comments:  Patient is not noting any difficulty  breathing    10. Disorder of thyroid  Comments:  No increase in hair loss or dry skin  Orders:  -     TSH    11. Other insomnia  Comments:  Patient has trouble falling asleep and staying asleep she will try time-released melatonin and let us know if that is not working in 3 weeks and we will refer h    12. Heart murmur  Comments:  Grade 2 left sternal border systolic murmur auscultated today echocardiogram is planned.  Orders:  -     Adult Transthoracic Echo Complete W/ Cont if Necessary Per Protocol; Future        Patient Instructions     Health Maintenance Due   Topic Date Due   • ZOSTER VACCINE (1 of 2) Never done   • PAP SMEAR  07/26/2019   • COVID-19 Vaccine (3 - Booster for Moderna series) 09/22/2021   • MAMMOGRAM  06/21/2022   • ANNUAL PHYSICAL  06/22/2022   • LUNG CANCER SCREENING  06/28/2022    12 hour fast for labs    Check blood pressure cuff for accuracy and send 10 blood pressures over 2 weeks.  Watch sodium, alcohol and weight

## 2022-08-25 ENCOUNTER — CLINICAL SUPPORT (OUTPATIENT)
Dept: FAMILY MEDICINE CLINIC | Facility: CLINIC | Age: 57
End: 2022-08-25

## 2022-08-25 DIAGNOSIS — D64.9 ANEMIA, UNSPECIFIED TYPE: ICD-10-CM

## 2022-08-25 LAB
ALBUMIN SERPL-MCNC: 3.9 G/DL (ref 3.5–5.2)
ALBUMIN/GLOB SERPL: 2 G/DL
ALP SERPL-CCNC: 66 U/L (ref 39–117)
ALT SERPL W P-5'-P-CCNC: 13 U/L (ref 1–33)
ANION GAP SERPL CALCULATED.3IONS-SCNC: 10.4 MMOL/L (ref 5–15)
AST SERPL-CCNC: 17 U/L (ref 1–32)
BASOPHILS # BLD AUTO: 0.02 10*3/MM3 (ref 0–0.2)
BASOPHILS NFR BLD AUTO: 0.4 % (ref 0–1.5)
BILIRUB SERPL-MCNC: 0.3 MG/DL (ref 0–1.2)
BUN SERPL-MCNC: 11 MG/DL (ref 6–20)
BUN/CREAT SERPL: 15.5 (ref 7–25)
CALCIUM SPEC-SCNC: 8.9 MG/DL (ref 8.6–10.5)
CHLORIDE SERPL-SCNC: 101 MMOL/L (ref 98–107)
CHOLEST SERPL-MCNC: 173 MG/DL (ref 0–200)
CO2 SERPL-SCNC: 24.6 MMOL/L (ref 22–29)
CREAT SERPL-MCNC: 0.71 MG/DL (ref 0.57–1)
DEPRECATED RDW RBC AUTO: 40.7 FL (ref 37–54)
EGFRCR SERPLBLD CKD-EPI 2021: 98.1 ML/MIN/1.73
EOSINOPHIL # BLD AUTO: 0.18 10*3/MM3 (ref 0–0.4)
EOSINOPHIL NFR BLD AUTO: 3.4 % (ref 0.3–6.2)
ERYTHROCYTE [DISTWIDTH] IN BLOOD BY AUTOMATED COUNT: 12.4 % (ref 12.3–15.4)
GLOBULIN UR ELPH-MCNC: 2 GM/DL
GLUCOSE SERPL-MCNC: 98 MG/DL (ref 65–99)
HCT VFR BLD AUTO: 35.2 % (ref 34–46.6)
HDLC SERPL-MCNC: 53 MG/DL (ref 40–60)
HGB BLD-MCNC: 11.3 G/DL (ref 12–15.9)
IMM GRANULOCYTES # BLD AUTO: 0.01 10*3/MM3 (ref 0–0.05)
IMM GRANULOCYTES NFR BLD AUTO: 0.2 % (ref 0–0.5)
LDLC SERPL CALC-MCNC: 97 MG/DL (ref 0–100)
LDLC/HDLC SERPL: 1.78 {RATIO}
LYMPHOCYTES # BLD AUTO: 1.62 10*3/MM3 (ref 0.7–3.1)
LYMPHOCYTES NFR BLD AUTO: 30.2 % (ref 19.6–45.3)
MCH RBC QN AUTO: 29 PG (ref 26.6–33)
MCHC RBC AUTO-ENTMCNC: 32.1 G/DL (ref 31.5–35.7)
MCV RBC AUTO: 90.5 FL (ref 79–97)
MONOCYTES # BLD AUTO: 0.66 10*3/MM3 (ref 0.1–0.9)
MONOCYTES NFR BLD AUTO: 12.3 % (ref 5–12)
NEUTROPHILS NFR BLD AUTO: 2.87 10*3/MM3 (ref 1.7–7)
NEUTROPHILS NFR BLD AUTO: 53.5 % (ref 42.7–76)
NRBC BLD AUTO-RTO: 0 /100 WBC (ref 0–0.2)
PLATELET # BLD AUTO: 210 10*3/MM3 (ref 140–450)
PMV BLD AUTO: 12.9 FL (ref 6–12)
POTASSIUM SERPL-SCNC: 3.8 MMOL/L (ref 3.5–5.2)
PROT SERPL-MCNC: 5.9 G/DL (ref 6–8.5)
RBC # BLD AUTO: 3.89 10*6/MM3 (ref 3.77–5.28)
SODIUM SERPL-SCNC: 136 MMOL/L (ref 136–145)
TRIGL SERPL-MCNC: 129 MG/DL (ref 0–150)
TSH SERPL DL<=0.05 MIU/L-ACNC: 3.34 UIU/ML (ref 0.27–4.2)
VLDLC SERPL-MCNC: 23 MG/DL (ref 5–40)
WBC NRBC COR # BLD: 5.36 10*3/MM3 (ref 3.4–10.8)

## 2022-08-25 PROCEDURE — 80050 GENERAL HEALTH PANEL: CPT | Performed by: PREVENTIVE MEDICINE

## 2022-08-25 PROCEDURE — 80061 LIPID PANEL: CPT | Performed by: PREVENTIVE MEDICINE

## 2022-08-25 PROCEDURE — 36415 COLL VENOUS BLD VENIPUNCTURE: CPT | Performed by: PREVENTIVE MEDICINE

## 2022-08-25 NOTE — PROGRESS NOTES
Venipuncture Blood Specimen Collection  Venipuncture performed in left arm by Lauren Neves MA with good hemostasis. Patient tolerated the procedure well without complications.   08/25/22   VENICE Martinez MD

## 2022-08-26 ENCOUNTER — TELEPHONE (OUTPATIENT)
Dept: FAMILY MEDICINE CLINIC | Facility: CLINIC | Age: 57
End: 2022-08-26

## 2022-08-26 RX ORDER — EZETIMIBE 10 MG/1
10 TABLET ORAL DAILY
Qty: 90 TABLET | Refills: 3 | Status: SHIPPED | OUTPATIENT
Start: 2022-08-26

## 2022-08-26 NOTE — PROGRESS NOTES
Bad cholesterol is 97 and goal is below 70 since she has had a stroke in the past.  Please limit saturated fats and increase walking to see if we can get that back to goal.  Is she still taking her Zetia?  Would she consider injectable medicine to lower her cholesterol?  She does have mild anemia also staff will send to eye fobs to check for blood in stool.  Has she noticed any blood in urine or other bleeding?  Call if any other questions or concerns

## 2022-08-26 NOTE — TELEPHONE ENCOUNTER
I spoke with the patient and she voiced understanding and said that yes she is taking Zetia and needs a refill. Patient wanted to know how often the inject medication would be?

## 2022-08-26 NOTE — TELEPHONE ENCOUNTER
I spoke with the patient and she said that she would be okay with doing the injections once every 2 weeks.

## 2022-08-26 NOTE — TELEPHONE ENCOUNTER
HUB TO READ  ----- Message from Isis Franklin MD sent at 8/26/2022  8:09 AM EDT -----  Bad cholesterol is 97 and goal is below 70 since she has had a stroke in the past.  Please limit saturated fats and increase walking to see if we can get that back to goal.  Is she still taking her Zetia?  Would she consider injectable medicine to lower her cholesterol?  She does have mild anemia also staff will send to eye fobs to check for blood in stool.  Has she noticed any blood in urine or other bleeding?  Call if any other questions or concerns

## 2022-08-31 ENCOUNTER — TELEPHONE (OUTPATIENT)
Dept: FAMILY MEDICINE CLINIC | Facility: CLINIC | Age: 57
End: 2022-08-31

## 2022-08-31 NOTE — TELEPHONE ENCOUNTER
Please call patient and make sure she is doing better after vist to [place] [facility] for chest pain-we would be glad to see her in followup if still pain

## 2022-09-06 RX ORDER — ATENOLOL 50 MG/1
TABLET ORAL
Qty: 90 TABLET | Refills: 0 | Status: SHIPPED | OUTPATIENT
Start: 2022-09-06 | End: 2022-10-10 | Stop reason: ALTCHOICE

## 2022-09-09 ENCOUNTER — HOSPITAL ENCOUNTER (OUTPATIENT)
Dept: CT IMAGING | Facility: HOSPITAL | Age: 57
Discharge: HOME | End: 2022-09-09

## 2022-09-09 ENCOUNTER — HOSPITAL ENCOUNTER (OUTPATIENT)
Dept: CARDIOLOGY | Facility: HOSPITAL | Age: 57
Discharge: HOME | End: 2022-09-09

## 2022-09-09 VITALS
DIASTOLIC BLOOD PRESSURE: 89 MMHG | WEIGHT: 170 LBS | HEIGHT: 64 IN | HEART RATE: 53 BPM | BODY MASS INDEX: 29.02 KG/M2 | SYSTOLIC BLOOD PRESSURE: 143 MMHG

## 2022-09-09 DIAGNOSIS — Z12.2 ENCOUNTER FOR SCREENING FOR LUNG CANCER: ICD-10-CM

## 2022-09-09 DIAGNOSIS — R01.1 HEART MURMUR: ICD-10-CM

## 2022-09-09 LAB
BH CV ECHO MEAS - ACS: 1.77 CM
BH CV ECHO MEAS - AO MAX PG: 9.3 MMHG
BH CV ECHO MEAS - AO MEAN PG: 5.2 MMHG
BH CV ECHO MEAS - AO ROOT DIAM: 2.7 CM
BH CV ECHO MEAS - AO V2 MAX: 152.6 CM/SEC
BH CV ECHO MEAS - AO V2 VTI: 35.3 CM
BH CV ECHO MEAS - AVA(I,D): 1.85 CM2
BH CV ECHO MEAS - EDV(CUBED): 103.7 ML
BH CV ECHO MEAS - EDV(MOD-SP4): 87 ML
BH CV ECHO MEAS - EF(MOD-BP): 57 %
BH CV ECHO MEAS - EF(MOD-SP4): 57 %
BH CV ECHO MEAS - ESV(CUBED): 27.5 ML
BH CV ECHO MEAS - ESV(MOD-SP4): 36.9 ML
BH CV ECHO MEAS - FS: 35.8 %
BH CV ECHO MEAS - IVS/LVPW: 1.25 CM
BH CV ECHO MEAS - IVSD: 1.39 CM
BH CV ECHO MEAS - LA DIMENSION: 4.4 CM
BH CV ECHO MEAS - LV DIASTOLIC VOL/BSA (35-75): 47.6 CM2
BH CV ECHO MEAS - LV MASS(C)D: 225.1 GRAMS
BH CV ECHO MEAS - LV MAX PG: 4.5 MMHG
BH CV ECHO MEAS - LV MEAN PG: 2.19 MMHG
BH CV ECHO MEAS - LV SYSTOLIC VOL/BSA (12-30): 20.2 CM2
BH CV ECHO MEAS - LV V1 MAX: 105.5 CM/SEC
BH CV ECHO MEAS - LV V1 VTI: 22.6 CM
BH CV ECHO MEAS - LVIDD: 4.7 CM
BH CV ECHO MEAS - LVIDS: 3 CM
BH CV ECHO MEAS - LVOT AREA: 2.9 CM2
BH CV ECHO MEAS - LVOT DIAM: 1.92 CM
BH CV ECHO MEAS - LVPWD: 1.11 CM
BH CV ECHO MEAS - MR MAX PG: 108.1 MMHG
BH CV ECHO MEAS - MR MAX VEL: 519 CM/SEC
BH CV ECHO MEAS - MV A MAX VEL: 54.8 CM/SEC
BH CV ECHO MEAS - MV DEC SLOPE: 182.7 CM/SEC2
BH CV ECHO MEAS - MV DEC TIME: 0.4 MSEC
BH CV ECHO MEAS - MV E MAX VEL: 72.5 CM/SEC
BH CV ECHO MEAS - MV E/A: 1.32
BH CV ECHO MEAS - MV MAX PG: 4 MMHG
BH CV ECHO MEAS - MV MEAN PG: 1.69 MMHG
BH CV ECHO MEAS - MV V2 VTI: 29.1 CM
BH CV ECHO MEAS - MVA(VTI): 2.24 CM2
BH CV ECHO MEAS - PA V2 MAX: 84.4 CM/SEC
BH CV ECHO MEAS - PULM A REVS DUR: 0.11 SEC
BH CV ECHO MEAS - PULM A REVS VEL: 22.8 CM/SEC
BH CV ECHO MEAS - PULM DIAS VEL: 39.3 CM/SEC
BH CV ECHO MEAS - PULM S/D: 1.68
BH CV ECHO MEAS - PULM SYS VEL: 66 CM/SEC
BH CV ECHO MEAS - RV MAX PG: 0.96 MMHG
BH CV ECHO MEAS - RV V1 MAX: 48.9 CM/SEC
BH CV ECHO MEAS - RV V1 VTI: 12.6 CM
BH CV ECHO MEAS - RVDD: 2.8 CM
BH CV ECHO MEAS - SI(MOD-SP4): 27.4 ML/M2
BH CV ECHO MEAS - SV(LVOT): 65.3 ML
BH CV ECHO MEAS - SV(MOD-SP4): 50.1 ML
BH CV ECHO MEAS - TR MAX PG: 16.2 MMHG
BH CV ECHO MEAS - TR MAX VEL: 201 CM/SEC
LV EF 2D ECHO EST: 60 %
MAXIMAL PREDICTED HEART RATE: 161 BPM
STRESS TARGET HR: 137 BPM

## 2022-09-09 PROCEDURE — 93306 TTE W/DOPPLER COMPLETE: CPT | Performed by: INTERNAL MEDICINE

## 2022-09-09 PROCEDURE — 71271 CT THORAX LUNG CANCER SCR C-: CPT

## 2022-09-09 PROCEDURE — 93306 TTE W/DOPPLER COMPLETE: CPT

## 2022-09-09 NOTE — PROGRESS NOTES
Echo showed a couple of valves that may be cause of murmur-can refer to Cardiologist if she'll go-let me know

## 2022-09-09 NOTE — PROGRESS NOTES
Left adrenal had mass on it-unchanged since 2016--would be consistent with benign mass.  No signs of lung cancer on this screen.

## 2022-09-11 ENCOUNTER — TELEPHONE (OUTPATIENT)
Dept: FAMILY MEDICINE CLINIC | Facility: CLINIC | Age: 57
End: 2022-09-11

## 2022-09-11 NOTE — TELEPHONE ENCOUNTER
----- Message from Isis Franklin MD sent at 9/9/2022  7:09 PM EDT -----  Left adrenal had mass on it-unchanged since 2016--would be consistent with benign mass.  No signs of lung cancer on this screen.

## 2022-09-11 NOTE — TELEPHONE ENCOUNTER
Hub to read  ----- Message from Isis Franklin MD sent at 9/9/2022  6:54 PM EDT -----  Echo showed a couple of valves that may be cause of murmur-can refer to Cardiologist if she'll go-let me know  Left adrenal had mass on it-unchanged since 2016--would be consistent with benign mass.  No signs of lung cancer on this screen.

## 2022-09-12 DIAGNOSIS — R01.1 HEART MURMUR: Primary | ICD-10-CM

## 2022-09-12 NOTE — TELEPHONE ENCOUNTER
Patient will see Cardiology no preference on provider.    Please review and sign order for referral.

## 2022-09-22 ENCOUNTER — OFFICE VISIT (OUTPATIENT)
Dept: FAMILY MEDICINE CLINIC | Facility: CLINIC | Age: 57
End: 2022-09-22

## 2022-09-22 ENCOUNTER — TELEPHONE (OUTPATIENT)
Dept: FAMILY MEDICINE CLINIC | Facility: CLINIC | Age: 57
End: 2022-09-22

## 2022-09-22 VITALS
BODY MASS INDEX: 28.61 KG/M2 | HEART RATE: 63 BPM | OXYGEN SATURATION: 96 % | DIASTOLIC BLOOD PRESSURE: 99 MMHG | SYSTOLIC BLOOD PRESSURE: 163 MMHG | TEMPERATURE: 98.5 F | HEIGHT: 64 IN | WEIGHT: 167.6 LBS

## 2022-09-22 DIAGNOSIS — R05.9 COUGH: ICD-10-CM

## 2022-09-22 DIAGNOSIS — Z12.31 ENCOUNTER FOR SCREENING MAMMOGRAM FOR MALIGNANT NEOPLASM OF BREAST: ICD-10-CM

## 2022-09-22 DIAGNOSIS — Z12.4 SCREENING FOR CERVICAL CANCER: ICD-10-CM

## 2022-09-22 DIAGNOSIS — J02.9 SORE THROAT: Primary | ICD-10-CM

## 2022-09-22 LAB
EXPIRATION DATE: NORMAL
EXPIRATION DATE: NORMAL
FLUAV AG UPPER RESP QL IA.RAPID: NOT DETECTED
FLUBV AG UPPER RESP QL IA.RAPID: NOT DETECTED
INTERNAL CONTROL: NORMAL
INTERNAL CONTROL: NORMAL
Lab: NORMAL
Lab: NORMAL
S PYO AG THROAT QL: NEGATIVE
SARS-COV-2 AG UPPER RESP QL IA.RAPID: NOT DETECTED

## 2022-09-22 PROCEDURE — 87428 SARSCOV & INF VIR A&B AG IA: CPT | Performed by: PREVENTIVE MEDICINE

## 2022-09-22 PROCEDURE — 99213 OFFICE O/P EST LOW 20 MIN: CPT | Performed by: PREVENTIVE MEDICINE

## 2022-09-22 PROCEDURE — 87880 STREP A ASSAY W/OPTIC: CPT | Performed by: PREVENTIVE MEDICINE

## 2022-09-22 RX ORDER — AMOXICILLIN 875 MG/1
875 TABLET, COATED ORAL 2 TIMES DAILY
Qty: 20 TABLET | Refills: 0 | Status: SHIPPED | OUTPATIENT
Start: 2022-09-22 | End: 2022-10-10

## 2022-09-22 NOTE — PROGRESS NOTES
"Subjective   Melodie Carlos is a 57 y.o. female presents for   Chief Complaint   Patient presents with   • Sore Throat     Sore throat started yesterday, waking up every 2 hours, then not being able to sleep, not feeling well. unable to take her cholesterol pill bc hurt throat too much.    Patient states that she has had a sore throat since yesterday her granddaughter has had goopy eyes but was felt to have allergies.  She had a temperature over 100 yesterday and is coughing up yellow sputum and feels terrible.  Rapid strep and rapid COVID were negative PCR test is pending.  Amoxicillin 875 twice daily for the next 10 days if cough persists we should get a chest x-ray.  She is advised to reset up her appointment for Pap smear and mammogram.  Blood pressure was not under good control she should home monitor and send us the results.    Health Maintenance Due   Topic Date Due   • ZOSTER VACCINE (1 of 2) Never done   • PAP SMEAR  07/26/2019   • COVID-19 Vaccine (3 - Booster for Moderna series) 06/17/2021   • MAMMOGRAM  06/21/2022       History of Present Illness     Vitals:    09/22/22 1536 09/22/22 1541 09/22/22 1544   BP: (!) 171/101 (!) 177/104 163/99   BP Location: Left arm Right arm Left arm   Patient Position: Sitting Sitting Standing   Cuff Size: Adult Adult Adult   Pulse: 63     Temp: 98.5 °F (36.9 °C)     TempSrc: Temporal     SpO2: 96%     Weight: 76 kg (167 lb 9.6 oz)     Height: 162.6 cm (64.02\")       Body mass index is 28.75 kg/m².    Current Outpatient Medications on File Prior to Visit   Medication Sig Dispense Refill   • atenolol (TENORMIN) 50 MG tablet TAKE ONE TABLET BY MOUTH EVERY DAY 90 tablet 0   • ezetimibe (Zetia) 10 MG tablet Take 1 tablet by mouth Daily. 90 tablet 3   • hydrALAZINE (APRESOLINE) 100 MG tablet TAKE ONE TABLET BY MOUTH THREE TIMES DAILY 90 tablet 1   • HYDROcodone-acetaminophen (NORCO)  MG per tablet Take 1 tablet by mouth Every 6 (Six) Hours.  0   • tiZANidine (ZANAFLEX) " 4 MG tablet Take 4 mg by mouth every night at bedtime.  5   • [DISCONTINUED] Evolocumab (REPATHA) solution prefilled syringe injection Inject 1 mL under the skin into the appropriate area as directed Every 14 (Fourteen) Days. 1 mL 5     No current facility-administered medications on file prior to visit.       The following portions of the patient's history were reviewed and updated as appropriate: allergies, current medications, past family history, past medical history, past social history, past surgical history and problem list.    Review of Systems   HENT: Positive for sore throat.    Respiratory: Positive for cough.        Objective   Physical Exam  Vitals reviewed.   Constitutional:       General: She is not in acute distress.     Appearance: Normal appearance. She is well-developed. She is not ill-appearing or toxic-appearing.   HENT:      Head: Normocephalic and atraumatic.      Right Ear: Tympanic membrane, ear canal and external ear normal.      Left Ear: Tympanic membrane, ear canal and external ear normal.      Nose: Nose normal.      Mouth/Throat:      Mouth: Mucous membranes are moist.      Pharynx: Posterior oropharyngeal erythema present.   Eyes:      Extraocular Movements: Extraocular movements intact.      Conjunctiva/sclera: Conjunctivae normal.      Pupils: Pupils are equal, round, and reactive to light.   Cardiovascular:      Rate and Rhythm: Normal rate and regular rhythm.      Heart sounds: Normal heart sounds.   Pulmonary:      Effort: Pulmonary effort is normal.      Comments: Decreased breath sounds bilaterally  Abdominal:      General: Bowel sounds are normal. There is no distension.      Palpations: Abdomen is soft. There is no mass.      Tenderness: There is no abdominal tenderness.   Musculoskeletal:      Cervical back: Neck supple.   Skin:     General: Skin is warm.   Neurological:      General: No focal deficit present.      Mental Status: She is alert and oriented to person, place,  and time.   Psychiatric:         Mood and Affect: Mood normal.         Behavior: Behavior normal.       PHQ-9 Total Score:      Assessment & Plan   Diagnoses and all orders for this visit:    1. Sore throat (Primary)  Comments:  Strep and COVID were negative PCR is pending warm salt water gargles advised  Orders:  -     COVID-19,APTIMA PANTHER(TESFAYE),[facility]/ [facility], NP/OP SWAB IN UTM/VTM/[facility] TRANSPORT MEDIA,24 HR TAT - Swab, Nasopharynx; Future  -     POCT SARS-CoV-2 Antigen CECILIO  -     POCT rapid strep A    2. Screening for cervical cancer  Comments:  Patient will be set appointment with gynecology  Orders:  -     Ambulatory Referral to Gynecology    3. Encounter for screening mammogram for malignant neoplasm of breast  Comments:  [facility]    4. Cough  Comments:  Patient is coughing up yellow sputum and has had a fever yesterday amoxicillin 875 twice daily 2 times a day.    Other orders  -     amoxicillin (AMOXIL) 875 MG tablet; Take 1 tablet by mouth 2 (Two) Times a Day.  Dispense: 20 tablet; Refill: 0  -     Evolocumab (REPATHA) solution prefilled syringe injection; Inject 1 mL under the skin into the appropriate area as directed Every 14 (Fourteen) Days.  Dispense: 1 mL; Refill: 5        Patient Instructions     Health Maintenance Due   Topic Date Due   • ZOSTER VACCINE (1 of 2) Never done   • PAP SMEAR  07/26/2019   • COVID-19 Vaccine (3 - Booster for Moderna series) 06/17/2021   • MAMMOGRAM  06/21/2022    Rest and fluids.  Warm salt water gargles 1/4 tsp salt 4 ounces water.  Call Monday to report progress

## 2022-09-22 NOTE — PATIENT INSTRUCTIONS
Health Maintenance Due   Topic Date Due    ZOSTER VACCINE (1 of 2) Never done    PAP SMEAR  07/26/2019    COVID-19 Vaccine (3 - Booster for Moderna series) 06/17/2021    MAMMOGRAM  06/21/2022    Rest and fluids.  Warm salt water gargles 1/4 tsp salt 4 ounces water.  Call Monday to report progress

## 2022-09-24 ENCOUNTER — HOSPITAL ENCOUNTER (EMERGENCY)
Facility: HOSPITAL | Age: 57
Discharge: HOME | End: 2022-09-24
Attending: EMERGENCY MEDICINE | Admitting: EMERGENCY MEDICINE

## 2022-09-24 VITALS
RESPIRATION RATE: 18 BRPM | DIASTOLIC BLOOD PRESSURE: 101 MMHG | OXYGEN SATURATION: 98 % | WEIGHT: 169.75 LBS | HEART RATE: 98 BPM | HEIGHT: 63 IN | TEMPERATURE: 98.7 F | BODY MASS INDEX: 30.08 KG/M2 | SYSTOLIC BLOOD PRESSURE: 202 MMHG

## 2022-09-24 DIAGNOSIS — J03.90 TONSILLITIS: ICD-10-CM

## 2022-09-24 DIAGNOSIS — I10 PRIMARY HYPERTENSION: Primary | ICD-10-CM

## 2022-09-24 LAB
ALBUMIN SERPL-MCNC: 4.2 G/DL (ref 3.5–5.2)
ALBUMIN/GLOB SERPL: 1.4 G/DL
ALP SERPL-CCNC: 99 U/L (ref 39–117)
ALT SERPL W P-5'-P-CCNC: 18 U/L (ref 1–33)
ANION GAP SERPL CALCULATED.3IONS-SCNC: 11 MMOL/L (ref 5–15)
AST SERPL-CCNC: 19 U/L (ref 1–32)
BASOPHILS # BLD AUTO: 0.1 10*3/MM3 (ref 0–0.2)
BASOPHILS NFR BLD AUTO: 0.7 % (ref 0–1.5)
BILIRUB SERPL-MCNC: 0.3 MG/DL (ref 0–1.2)
BUN SERPL-MCNC: 12 MG/DL (ref 6–20)
BUN/CREAT SERPL: 16.9 (ref 7–25)
CALCIUM SPEC-SCNC: 9.4 MG/DL (ref 8.6–10.5)
CHLORIDE SERPL-SCNC: 99 MMOL/L (ref 98–107)
CO2 SERPL-SCNC: 28 MMOL/L (ref 22–29)
CREAT SERPL-MCNC: 0.71 MG/DL (ref 0.57–1)
DEPRECATED RDW RBC AUTO: 44.2 FL (ref 37–54)
EGFRCR SERPLBLD CKD-EPI 2021: 98.1 ML/MIN/1.73
EOSINOPHIL # BLD AUTO: 0.3 10*3/MM3 (ref 0–0.4)
EOSINOPHIL NFR BLD AUTO: 3.9 % (ref 0.3–6.2)
ERYTHROCYTE [DISTWIDTH] IN BLOOD BY AUTOMATED COUNT: 14.1 % (ref 12.3–15.4)
GLOBULIN UR ELPH-MCNC: 3 GM/DL
GLUCOSE SERPL-MCNC: 100 MG/DL (ref 65–99)
HCT VFR BLD AUTO: 36.9 % (ref 34–46.6)
HETEROPH AB SER QL LA: NEGATIVE
HGB BLD-MCNC: 11.9 G/DL (ref 12–15.9)
LYMPHOCYTES # BLD AUTO: 1.5 10*3/MM3 (ref 0.7–3.1)
LYMPHOCYTES NFR BLD AUTO: 21.1 % (ref 19.6–45.3)
MCH RBC QN AUTO: 29.1 PG (ref 26.6–33)
MCHC RBC AUTO-ENTMCNC: 32.3 G/DL (ref 31.5–35.7)
MCV RBC AUTO: 90 FL (ref 79–97)
MONOCYTES # BLD AUTO: 0.7 10*3/MM3 (ref 0.1–0.9)
MONOCYTES NFR BLD AUTO: 10 % (ref 5–12)
NEUTROPHILS NFR BLD AUTO: 4.7 10*3/MM3 (ref 1.7–7)
NEUTROPHILS NFR BLD AUTO: 64.3 % (ref 42.7–76)
NRBC BLD AUTO-RTO: 0 /100 WBC (ref 0–0.2)
PLATELET # BLD AUTO: 221 10*3/MM3 (ref 140–450)
PMV BLD AUTO: 9.9 FL (ref 6–12)
POTASSIUM SERPL-SCNC: 4 MMOL/L (ref 3.5–5.2)
PROT SERPL-MCNC: 7.2 G/DL (ref 6–8.5)
RBC # BLD AUTO: 4.1 10*6/MM3 (ref 3.77–5.28)
SODIUM SERPL-SCNC: 138 MMOL/L (ref 136–145)
WBC NRBC COR # BLD: 7.3 10*3/MM3 (ref 3.4–10.8)

## 2022-09-24 PROCEDURE — 85025 COMPLETE CBC W/AUTO DIFF WBC: CPT | Performed by: EMERGENCY MEDICINE

## 2022-09-24 PROCEDURE — 96374 THER/PROPH/DIAG INJ IV PUSH: CPT

## 2022-09-24 PROCEDURE — 25010000002 DEXAMETHASONE PER 1 MG: Performed by: EMERGENCY MEDICINE

## 2022-09-24 PROCEDURE — 86308 HETEROPHILE ANTIBODY SCREEN: CPT | Performed by: EMERGENCY MEDICINE

## 2022-09-24 PROCEDURE — 99283 EMERGENCY DEPT VISIT LOW MDM: CPT

## 2022-09-24 PROCEDURE — 80053 COMPREHEN METABOLIC PANEL: CPT | Performed by: EMERGENCY MEDICINE

## 2022-09-24 PROCEDURE — 36415 COLL VENOUS BLD VENIPUNCTURE: CPT

## 2022-09-24 RX ORDER — AMLODIPINE BESYLATE 5 MG/1
5 TABLET ORAL DAILY
Qty: 30 TABLET | Refills: 0 | Status: SHIPPED | OUTPATIENT
Start: 2022-09-24 | End: 2022-09-27 | Stop reason: SDUPTHER

## 2022-09-24 RX ORDER — AMLODIPINE BESYLATE 5 MG/1
5 TABLET ORAL ONCE
Status: COMPLETED | OUTPATIENT
Start: 2022-09-24 | End: 2022-09-24

## 2022-09-24 RX ORDER — DEXAMETHASONE SODIUM PHOSPHATE 4 MG/ML
10 INJECTION, SOLUTION INTRA-ARTICULAR; INTRALESIONAL; INTRAMUSCULAR; INTRAVENOUS; SOFT TISSUE ONCE
Status: COMPLETED | OUTPATIENT
Start: 2022-09-24 | End: 2022-09-24

## 2022-09-24 RX ORDER — SODIUM CHLORIDE 0.9 % (FLUSH) 0.9 %
10 SYRINGE (ML) INJECTION AS NEEDED
Status: DISCONTINUED | OUTPATIENT
Start: 2022-09-24 | End: 2022-09-24 | Stop reason: HOSPADM

## 2022-09-24 RX ADMIN — DEXAMETHASONE SODIUM PHOSPHATE 10 MG: 4 INJECTION, SOLUTION INTRAMUSCULAR; INTRAVENOUS at 17:49

## 2022-09-24 RX ADMIN — AMLODIPINE BESYLATE 5 MG: 5 TABLET ORAL at 17:48

## 2022-09-24 NOTE — ED PROVIDER NOTES
Subjective   History of Present Illness  57-year-old female presents with sore throat.  She started with symptoms on Thursday.  She has had some cough and headache.  She had some fever.  She has had none since so.  She saw her PMD who had started her on Amoxil.  She is having no vomiting no diarrhea.  She has no generalized body aches.  No urinary difficulty.  Her tonsils feel enlarged.  Review of Systems  Positive for fever sore throat headache cough no vomiting no diarrhea.  Past Medical History:   Diagnosis Date   • Anemia    • Cerebrovascular accident (CVA) (HCC) 3/8/2012    x2   • Hyperlipidemia    • Hypertension        Allergies   Allergen Reactions   • Lisinopril Cough   • Azithromycin Diarrhea       Past Surgical History:   Procedure Laterality Date   • COLONOSCOPY     • ELBOW ARTHROSCOPY Right     2x   • HYSTERECTOMY     • WRIST FRACTURE SURGERY         Family History   Problem Relation Age of Onset   • Lung cancer Mother    • Heart disease Father    • No Known Problems Sister    • No Known Problems Brother    • No Known Problems Brother        Social History     Socioeconomic History   • Marital status: Single   Tobacco Use   • Smoking status: Former Smoker     Packs/day: 2.00     Years: 35.00     Pack years: 70.00     Types: Cigarettes     Quit date:      Years since quittin.7   • Smokeless tobacco: Never Used   Vaping Use   • Vaping Use: Never used   Substance and Sexual Activity   • Alcohol use: Yes     Comment: social   • Drug use: No   • Sexual activity: Not Currently     Prior to Admission medications    Medication Sig Start Date End Date Taking? Authorizing Provider   amoxicillin (AMOXIL) 875 MG tablet Take 1 tablet by mouth 2 (Two) Times a Day. 22   Isis Franklin MD   atenolol (TENORMIN) 50 MG tablet TAKE ONE TABLET BY MOUTH EVERY DAY 22   Isis Franklin MD   Evolocumab (REPATHA) solution prefilled syringe injection Inject 1 mL under the skin into the appropriate  area as directed Every 14 (Fourteen) Days. 9/22/22   Isis Franklin MD   ezetimibe (Zetia) 10 MG tablet Take 1 tablet by mouth Daily. 8/26/22   Isis Franklin MD   hydrALAZINE (APRESOLINE) 100 MG tablet TAKE ONE TABLET BY MOUTH THREE TIMES DAILY 5/10/22   Isis Franklin MD   HYDROcodone-acetaminophen (NORCO)  MG per tablet Take 1 tablet by mouth Every 6 (Six) Hours. 7/20/19   Provider, MD Mik   tiZANidine (ZANAFLEX) 4 MG tablet Take 4 mg by mouth every night at bedtime. 7/20/19   Provider, MD Mik           Objective   Physical Exam  57-year-old female awake alert.  Generally well-developed well-nourished.  Pupils equal round react light.  Oropharynx reveals some tonsillar enlargement.  Neck supple without significant adenopathy airway patent.  Chest clear equal breath sounds.  Cardiovascular regular in rhythm abdomen soft nontender.  Extremities without tenderness or edema.  Neurologic exam no focal findings noted.  Skin without rash.  Procedures           ED Course      Results for orders placed or performed during the hospital encounter of 09/24/22   Comprehensive Metabolic Panel    Specimen: Arm, Left; Blood   Result Value Ref Range    Glucose 100 (H) 65 - 99 mg/dL    BUN 12 6 - 20 mg/dL    Creatinine 0.71 0.57 - 1.00 mg/dL    Sodium 138 136 - 145 mmol/L    Potassium 4.0 3.5 - 5.2 mmol/L    Chloride 99 98 - 107 mmol/L    CO2 28.0 22.0 - 29.0 mmol/L    Calcium 9.4 8.6 - 10.5 mg/dL    Total Protein 7.2 6.0 - 8.5 g/dL    Albumin 4.20 3.50 - 5.20 g/dL    ALT (SGPT) 18 1 - 33 U/L    AST (SGOT) 19 1 - 32 U/L    Alkaline Phosphatase 99 39 - 117 U/L    Total Bilirubin 0.3 0.0 - 1.2 mg/dL    Globulin 3.0 gm/dL    A/G Ratio 1.4 g/dL    BUN/Creatinine Ratio 16.9 7.0 - 25.0    Anion Gap 11.0 5.0 - 15.0 mmol/L    eGFR 98.1 >60.0 mL/min/1.73   Mononucleosis Screen    Specimen: Arm, Left; Blood   Result Value Ref Range    Monospot Negative Negative   CBC Auto Differential    Specimen:  "Arm, Left; Blood   Result Value Ref Range    WBC 7.30 3.40 - 10.80 10*3/mm3    RBC 4.10 3.77 - 5.28 10*6/mm3    Hemoglobin 11.9 (L) 12.0 - 15.9 g/dL    Hematocrit 36.9 34.0 - 46.6 %    MCV 90.0 79.0 - 97.0 fL    MCH 29.1 26.6 - 33.0 pg    MCHC 32.3 31.5 - 35.7 g/dL    RDW 14.1 12.3 - 15.4 %    RDW-SD 44.2 37.0 - 54.0 fl    MPV 9.9 6.0 - 12.0 fL    Platelets 221 140 - 450 10*3/mm3    Neutrophil % 64.3 42.7 - 76.0 %    Lymphocyte % 21.1 19.6 - 45.3 %    Monocyte % 10.0 5.0 - 12.0 %    Eosinophil % 3.9 0.3 - 6.2 %    Basophil % 0.7 0.0 - 1.5 %    Neutrophils, Absolute 4.70 1.70 - 7.00 10*3/mm3    Lymphocytes, Absolute 1.50 0.70 - 3.10 10*3/mm3    Monocytes, Absolute 0.70 0.10 - 0.90 10*3/mm3    Eosinophils, Absolute 0.30 0.00 - 0.40 10*3/mm3    Basophils, Absolute 0.10 0.00 - 0.20 10*3/mm3    nRBC 0.0 0.0 - 0.2 /100 WBC     No radiology results for the last day  Medications   sodium chloride 0.9 % flush 10 mL (has no administration in time range)   dexamethasone (DECADRON) injection 10 mg (10 mg Intravenous Given 9/24/22 1749)   amLODIPine (NORVASC) tablet 5 mg (5 mg Oral Given 9/24/22 1748)     BP (!) 202/93   Pulse 62   Temp 98.5 °F (36.9 °C) (Oral)   Resp 14   Ht 160 cm (63\")   Wt 77 kg (169 lb 12.1 oz)   LMP  (LMP Unknown)   SpO2 98%   BMI 30.07 kg/m²                                        MDM  Patient had IV placed.  Was given Decadron.  She was given Norvasc for her hypertension.  She had normal CBC with hemoglobin 11.9 normal comprehensive metabolic panel with glucose 100 Monospot was negative.  Patient's findings were discussed with her.  This is most likely viral illness she was discharged Norvasc was added to her medication regimen.  Advised to follow-up with her primary provider for blood pressure recheck, return new or worsening symptoms.  Final diagnoses:   Primary hypertension   Tonsillitis       ED Disposition  ED Disposition     ED Disposition   Discharge    Condition   Stable    Comment   --    "          Isis Franklin MD  [address] [facility] [place] [address] [phone]               Medication List      New Prescriptions    amLODIPine 5 MG tablet  Commonly known as: NORVASC  Take 1 tablet by mouth Daily.           Where to Get Your Medications      These medications were sent to [facility] [address] [place] [address] [place] - [address] [facility] - [phone]  - [phone] FX  [address] [facility] [place] [address]    Phone: [phone]   · amLODIPine 5 MG tablet          Devin Shepherd MD  09/24/22 2004

## 2022-09-25 ENCOUNTER — TELEPHONE (OUTPATIENT)
Dept: FAMILY MEDICINE CLINIC | Facility: CLINIC | Age: 57
End: 2022-09-25

## 2022-09-25 NOTE — DISCHARGE INSTRUCTIONS
Follow-up Dr. Bernal this week for blood pressure recheck, may use warm salt water gargles Chloraseptic for throat, may use Tylenol for pain.  Return new or worsening symptoms

## 2022-09-27 ENCOUNTER — OFFICE VISIT (OUTPATIENT)
Dept: FAMILY MEDICINE CLINIC | Facility: CLINIC | Age: 57
End: 2022-09-27

## 2022-09-27 VITALS
SYSTOLIC BLOOD PRESSURE: 142 MMHG | WEIGHT: 168.4 LBS | BODY MASS INDEX: 29.84 KG/M2 | HEIGHT: 63 IN | DIASTOLIC BLOOD PRESSURE: 89 MMHG | OXYGEN SATURATION: 97 % | HEART RATE: 93 BPM | TEMPERATURE: 98.3 F

## 2022-09-27 DIAGNOSIS — Z12.31 ENCOUNTER FOR SCREENING MAMMOGRAM FOR MALIGNANT NEOPLASM OF BREAST: ICD-10-CM

## 2022-09-27 DIAGNOSIS — Z12.4 SCREENING FOR CERVICAL CANCER: ICD-10-CM

## 2022-09-27 DIAGNOSIS — J02.9 SORE THROAT: Primary | ICD-10-CM

## 2022-09-27 DIAGNOSIS — I10 PRIMARY HYPERTENSION: ICD-10-CM

## 2022-09-27 PROCEDURE — 99214 OFFICE O/P EST MOD 30 MIN: CPT | Performed by: PREVENTIVE MEDICINE

## 2022-09-27 RX ORDER — AMLODIPINE BESYLATE 5 MG/1
5 TABLET ORAL DAILY
Qty: 90 TABLET | Refills: 1 | Status: SHIPPED | OUTPATIENT
Start: 2022-09-27 | End: 2022-10-10

## 2022-09-27 NOTE — PATIENT INSTRUCTIONS
Health Maintenance Due   Topic Date Due    ZOSTER VACCINE (1 of 2) Never done    PAP SMEAR  07/26/2019    COVID-19 Vaccine (3 - Booster for Moderna series) 06/17/2021    MAMMOGRAM  06/21/2022    Patient to send 10 blood pressures over next 2 weeks.  Pulses as well.

## 2022-09-27 NOTE — PROGRESS NOTES
"Andre Carlos is a 57 y.o. female presents for   Chief Complaint   Patient presents with   • Hospital Follow Up Visit   Patient presents today for follow-up from ER where she went with swollen tonsils and concerned that she was unable to breathe and swallow.  She was treated with steroid and Monospot was obtained patient was advised that she had a viral stomatitis.  Her blood pressure was quite high in the emergency room but is much better today back on the amlodipine 5 we have restarted her atenolol 50 we will leave the hydralazine off till we see how this controls her blood pressure.  Patient is able to swallow and eat.    Health Maintenance Due   Topic Date Due   • ZOSTER VACCINE (1 of 2) Never done   • PAP SMEAR  07/26/2019   • COVID-19 Vaccine (3 - Booster for Moderna series) 06/17/2021       History of Present Illness     Vitals:    09/27/22 1352 09/27/22 1355   BP: 146/88 142/89   BP Location: Right arm Left arm   Patient Position: Sitting Sitting   Cuff Size: Adult Adult   Pulse: 93    Temp: 98.3 °F (36.8 °C)    TempSrc: Temporal    SpO2: 97%    Weight: 76.4 kg (168 lb 6.4 oz)    Height: 160 cm (62.99\")      Body mass index is 29.84 kg/m².    Current Outpatient Medications on File Prior to Visit   Medication Sig Dispense Refill   • amoxicillin (AMOXIL) 875 MG tablet Take 1 tablet by mouth 2 (Two) Times a Day. 20 tablet 0   • Evolocumab (REPATHA) solution prefilled syringe injection Inject 1 mL under the skin into the appropriate area as directed Every 14 (Fourteen) Days. 1 mL 5   • HYDROcodone-acetaminophen (NORCO)  MG per tablet Take 1 tablet by mouth Every 6 (Six) Hours.  0   • tiZANidine (ZANAFLEX) 4 MG tablet Take 4 mg by mouth every night at bedtime.  5   • [DISCONTINUED] amLODIPine (NORVASC) 5 MG tablet Take 1 tablet by mouth Daily. 30 tablet 0   • atenolol (TENORMIN) 50 MG tablet TAKE ONE TABLET BY MOUTH EVERY DAY 90 tablet 0   • ezetimibe (Zetia) 10 MG tablet Take 1 tablet by " mouth Daily. 90 tablet 3   • [DISCONTINUED] hydrALAZINE (APRESOLINE) 100 MG tablet TAKE ONE TABLET BY MOUTH THREE TIMES DAILY 90 tablet 1     No current facility-administered medications on file prior to visit.       The following portions of the patient's history were reviewed and updated as appropriate: allergies, current medications, past family history, past medical history, past social history, past surgical history and problem list.    Review of Systems   HENT: Positive for sore throat and swollen glands.        Objective   Physical Exam  Vitals reviewed.   Constitutional:       General: She is not in acute distress.     Appearance: Normal appearance. She is well-developed. She is not ill-appearing or toxic-appearing.   HENT:      Head: Normocephalic and atraumatic.      Right Ear: Tympanic membrane, ear canal and external ear normal.      Left Ear: Tympanic membrane, ear canal and external ear normal.      Nose: Nose normal.      Mouth/Throat:      Mouth: Mucous membranes are moist.      Pharynx: Oropharyngeal exudate and posterior oropharyngeal erythema present.   Eyes:      Extraocular Movements: Extraocular movements intact.      Conjunctiva/sclera: Conjunctivae normal.      Pupils: Pupils are equal, round, and reactive to light.   Neck:      Comments: Negative cricothyroid maneuver.  Cardiovascular:      Rate and Rhythm: Normal rate and regular rhythm.      Heart sounds: Normal heart sounds.   Pulmonary:      Effort: Pulmonary effort is normal.      Comments: Decreased breath sounds bilaterally  Abdominal:      General: Bowel sounds are normal. There is no distension.      Palpations: Abdomen is soft. There is no mass.      Tenderness: There is no abdominal tenderness.   Musculoskeletal:      Cervical back: Neck supple. Tenderness present.   Lymphadenopathy:      Cervical: Cervical adenopathy present.   Skin:     General: Skin is warm.   Neurological:      Mental Status: She is alert and oriented to  person, place, and time.   Psychiatric:         Mood and Affect: Mood normal.         Behavior: Behavior normal.       PHQ-9 Total Score:      Assessment & Plan   Diagnoses and all orders for this visit:    1. Sore throat (Primary)  Comments:  Still swollen tonsils but negative cricothyroid maneuver and swelling has decreased per patient.    2. Screening for cervical cancer  Comments:  Patient has been referred to Dr. Slade.    3. Encounter for screening mammogram for malignant neoplasm of breast  Comments:  Patient encouraged to get her mammogram.  Orders:  -     Mammo Screening Digital Tomosynthesis Bilateral With CAD; Future    4. Primary hypertension  Comments:  Blood pressure has improved.  We will continue the amlodipine and monitor again in a few weeks to decide if we need to restart hydralazine.  Atenolol restarted    Other orders  -     amLODIPine (NORVASC) 5 MG tablet; Take 1 tablet by mouth Daily.  Dispense: 90 tablet; Refill: 1        Patient Instructions     Health Maintenance Due   Topic Date Due   • ZOSTER VACCINE (1 of 2) Never done   • PAP SMEAR  07/26/2019   • COVID-19 Vaccine (3 - Booster for Moderna series) 06/17/2021   • MAMMOGRAM  06/21/2022    Patient to send 10 blood pressures over next 2 weeks.  Pulses as well.

## 2022-10-09 NOTE — PROGRESS NOTES
Encounter Date:10/10/2022      Patient ID: Melodie Carlos is a 57 y.o. female.    Chief Complaint   Patient presents with   • Establish Care          History of Present Illness    Melodie is a 57-year-old with past medical history of hypertension, hyperlipidemia and previous stroke who presents as a new patient consult for possible murmur.    She said she was told she possibly has a murmur.  She denies any lower extremity edema orthopnea, lightheadedness, syncope, palpitations, dizziness.  She denies shortness of breath.  Otherwise she does mention that she has history of 2 previous strokes.  She mentions episodes of chest tightness in the center of her chest which she thought was indigestion.  This has been going on for the last year.  They only occur at rest and do not occur with exertion.  They last about a minute and are nonradiating.  Her last episode was yesterday while watching TV.  She has never had an ER visit for this.  No other cardiac history.  This is not associated with diaphoresis, nausea, arm pain, jaw pain.  It resolves on its own.  It has not changed in frequency.    Echo on 8/15/2022 was reviewed in clinic today and showed moderate concentric hypertrophy, EF 60%, mild dilatation of the ascending aorta measuring 3.8 cm.  She has severe left atrial enlargement.    She has hypertension which has been uncontrolled.  She says at home it is usually in the 160s.  She has noticed that her heart rate has also been low.  She has some fatigue.  She was recently started on amlodipine after an ER visit.  Otherwise she has been on atenolol.  She was previously on hydralazine also.    For her hyperlipidemia she has been on Zetia.  She was prescribed Repatha but this was not covered by her insurance plan so she has not been using that.  She is currently not on any statins.    The following portions of the patient's history were reviewed and updated as appropriate: allergies, current medications, past family  history, past medical history, past social history, past surgical history, and problem list.    Review of Systems   Constitutional: Negative for fever and malaise/fatigue.   Cardiovascular: Positive for leg swelling. Negative for chest pain, dyspnea on exertion and palpitations.   Respiratory: Negative for cough and shortness of breath.    Skin: Negative for rash.   Gastrointestinal: Negative for abdominal pain, nausea and vomiting.   Neurological: Negative for focal weakness and headaches.   All other systems reviewed and are negative.        Current Outpatient Medications:   •  amLODIPine (NORVASC) 10 MG tablet, Take 1 tablet by mouth Daily., Disp: 90 tablet, Rfl: 3  •  ezetimibe (Zetia) 10 MG tablet, Take 1 tablet by mouth Daily., Disp: 90 tablet, Rfl: 3  •  HYDROcodone-acetaminophen (NORCO)  MG per tablet, Take 1 tablet by mouth Every 6 (Six) Hours., Disp: , Rfl: 0  •  tiZANidine (ZANAFLEX) 4 MG tablet, Take 4 mg by mouth every night at bedtime., Disp: , Rfl: 5  •  atorvastatin (LIPITOR) 80 MG tablet, Take 1 tablet by mouth Daily., Disp: 90 tablet, Rfl: 3  •  chlorthalidone (HYGROTON) 25 MG tablet, Take 0.5 tablets by mouth Daily., Disp: 45 tablet, Rfl: 3  •  Evolocumab (REPATHA) solution prefilled syringe injection, Inject 1 mL under the skin into the appropriate area as directed Every 14 (Fourteen) Days., Disp: 1 mL, Rfl: 5    Current Facility-Administered Medications:   •  aspirin chewable tablet 81 mg, 81 mg, Oral, Daily, Daina Alonzo MD    Allergies   Allergen Reactions   • Lisinopril Cough   • Azithromycin Diarrhea       Family History   Problem Relation Age of Onset   • Lung cancer Mother    • Heart disease Father    • No Known Problems Sister    • No Known Problems Brother    • No Known Problems Brother        Past Surgical History:   Procedure Laterality Date   • COLONOSCOPY     • ELBOW ARTHROSCOPY Right 2004    2x   • HYSTERECTOMY     • WRIST FRACTURE SURGERY         Past Medical History:  "  Diagnosis Date   • Anemia    • Cerebrovascular accident (CVA) ([facility]) 3/8/2012    x2   • Hyperlipidemia    • Hypertension        Social History     Socioeconomic History   • Marital status: Single   • Number of children: 1   Tobacco Use   • Smoking status: Former     Packs/day: 2.00     Years: 35.00     Pack years: 70.00     Types: Cigarettes     Quit date:      Years since quittin.7     Passive exposure: Past   • Smokeless tobacco: Never   Vaping Use   • Vaping Use: Never used   Substance and Sexual Activity   • Alcohol use: Yes     Comment: social   • Drug use: No   • Sexual activity: Not Currently           ECG 12 Lead    Date/Time: 10/10/2022 10:38 AM  Performed by: Diana Alonzo MD  Authorized by: Diana Alonzo MD   Comparison: not compared with previous ECG   Previous ECG: no previous ECG available  Rhythm: sinus rhythm and sinus bradycardia  Rate: normal  BPM: 45  Conduction: conduction normal  QRS axis: normal    Clinical impression: normal ECG         click this look      Objective:       Physical Exam    /88   Pulse 52   Ht 160 cm (63\")   Wt 76.2 kg (168 lb)   LMP  (LMP Unknown)   SpO2 96%   BMI 29.76 kg/m²   The patient is alert, oriented and in no distress.    Vital signs as noted above.    Head and neck revealed no carotid bruits or jugular venous distension.  No thyromegaly or lymphadenopathy is present.    Lungs clear.  No wheezing.  Breath sounds are normal bilaterally.    Heart normal first and second heart sounds.  No murmur..  No pericardial rub is present.  No gallop is present.    Abdomen soft and nontender.  No organomegaly is present.    Extremities revealed good peripheral pulses without any pedal edema.    Skin warm and dry.    Musculoskeletal system is grossly normal.    CNS grossly normal.           Diagnosis Plan   1. Hyperlipidemia, unspecified hyperlipidemia type        2. Primary hypertension  ECG 12 Lead      3. Heart murmur  ECG 12 Lead      4. History of " stroke  aspirin chewable tablet 81 mg      LAB RESULTS (LAST 7 DAYS)    CBC        BMP        CMP         BNP        TROPONIN        CoAg        Creatinine Clearance  Estimated Creatinine Clearance: 83.4 mL/min (by C-G formula based on SCr of 0.71 mg/dL).    ABG        Radiology  No radiology results for the last day         Assessment and Plan       Diagnoses and all orders for this visit:    1. Hyperlipidemia, unspecified hyperlipidemia type (Primary)    2. Primary hypertension  -     ECG 12 Lead    3. Heart murmur  Overview:  Grade 2/5 Systollic murmur LSB-echo ordered    Orders:  -     ECG 12 Lead    4. History of stroke  -     aspirin chewable tablet 81 mg    Other orders  -     atorvastatin (LIPITOR) 80 MG tablet; Take 1 tablet by mouth Daily.  Dispense: 90 tablet; Refill: 3  -     Discontinue: amLODIPine (NORVASC) 10 MG tablet; Take 1 tablet by mouth Daily.  Dispense: 90 tablet; Refill: 3  -     chlorthalidone (HYGROTON) 25 MG tablet; Take 0.5 tablets by mouth Daily.  Dispense: 45 tablet; Refill: 3  -     Discontinue: amLODIPine (NORVASC) 10 MG tablet; Take 1 tablet by mouth Daily.  Dispense: 90 tablet; Refill: 3  -     amLODIPine (NORVASC) 10 MG tablet; Take 1 tablet by mouth Daily.  Dispense: 90 tablet; Refill: 3  Hyperlipidemia  Total cholesterol 173, HDL 53, LDL 97, triglycerides 129  History of previous stroke  We will start on statin  Continue with Zetia    Hypertension: Uncontrolled  Currently on amlodipine and atenolol  We will stop atenolol and start chlorthalidone  Increase amlodipine to 10 mg    Murmur  Echo reviewed today and not show any valvular heart disease  No murmur noted on exam today  No further work-up at this time    Atypical chest pain  We went over signs and symptoms of typical chest pain.  No further work-up at this time.    History of stroke  Start aspirin 81 mg      Diana Alonzo MD

## 2022-10-10 ENCOUNTER — OFFICE VISIT (OUTPATIENT)
Dept: CARDIOLOGY | Facility: CLINIC | Age: 57
End: 2022-10-10

## 2022-10-10 VITALS
OXYGEN SATURATION: 96 % | SYSTOLIC BLOOD PRESSURE: 164 MMHG | HEIGHT: 63 IN | HEART RATE: 52 BPM | DIASTOLIC BLOOD PRESSURE: 88 MMHG | WEIGHT: 168 LBS | BODY MASS INDEX: 29.77 KG/M2

## 2022-10-10 DIAGNOSIS — Z86.73 HISTORY OF STROKE: ICD-10-CM

## 2022-10-10 DIAGNOSIS — E78.5 HYPERLIPIDEMIA, UNSPECIFIED HYPERLIPIDEMIA TYPE: Primary | ICD-10-CM

## 2022-10-10 DIAGNOSIS — R01.1 HEART MURMUR: ICD-10-CM

## 2022-10-10 DIAGNOSIS — I10 PRIMARY HYPERTENSION: ICD-10-CM

## 2022-10-10 PROCEDURE — 99204 OFFICE O/P NEW MOD 45 MIN: CPT | Performed by: INTERNAL MEDICINE

## 2022-10-10 PROCEDURE — 93000 ELECTROCARDIOGRAM COMPLETE: CPT | Performed by: INTERNAL MEDICINE

## 2022-10-10 RX ORDER — ASPIRIN 81 MG/1
81 TABLET, CHEWABLE ORAL DAILY
Status: SHIPPED | OUTPATIENT
Start: 2022-10-10

## 2022-10-10 RX ORDER — AMLODIPINE BESYLATE 10 MG/1
10 TABLET ORAL DAILY
Qty: 90 TABLET | Refills: 3 | Status: SHIPPED | OUTPATIENT
Start: 2022-10-10 | End: 2022-10-10 | Stop reason: SDUPTHER

## 2022-10-10 RX ORDER — AMLODIPINE BESYLATE 10 MG/1
10 TABLET ORAL DAILY
Qty: 90 TABLET | Refills: 3 | Status: SHIPPED | OUTPATIENT
Start: 2022-10-10

## 2022-10-10 RX ORDER — ATORVASTATIN CALCIUM 80 MG/1
80 TABLET, FILM COATED ORAL DAILY
Qty: 90 TABLET | Refills: 3 | Status: SHIPPED | OUTPATIENT
Start: 2022-10-10

## 2022-10-10 RX ORDER — CHLORTHALIDONE 25 MG/1
12.5 TABLET ORAL DAILY
Qty: 45 TABLET | Refills: 3 | Status: SHIPPED | OUTPATIENT
Start: 2022-10-10 | End: 2023-03-09

## 2022-10-10 RX ORDER — AMLODIPINE BESYLATE 10 MG/1
10 TABLET ORAL DAILY
Qty: 90 TABLET | Refills: 3 | Status: SHIPPED | OUTPATIENT
Start: 2022-10-10 | End: 2022-10-10

## 2022-10-25 ENCOUNTER — OFFICE VISIT (OUTPATIENT)
Dept: FAMILY MEDICINE CLINIC | Facility: CLINIC | Age: 57
End: 2022-10-25

## 2022-10-25 VITALS
TEMPERATURE: 98.2 F | WEIGHT: 162.8 LBS | SYSTOLIC BLOOD PRESSURE: 157 MMHG | HEIGHT: 63 IN | BODY MASS INDEX: 28.84 KG/M2 | DIASTOLIC BLOOD PRESSURE: 102 MMHG | OXYGEN SATURATION: 95 % | HEART RATE: 68 BPM

## 2022-10-25 DIAGNOSIS — M79.642 PAIN OF LEFT HAND: Primary | ICD-10-CM

## 2022-10-25 DIAGNOSIS — M53.3 CHRONIC LEFT SI JOINT PAIN: ICD-10-CM

## 2022-10-25 DIAGNOSIS — E66.3 OVERWEIGHT WITH BODY MASS INDEX (BMI) OF 28 TO 28.9 IN ADULT: ICD-10-CM

## 2022-10-25 DIAGNOSIS — G89.29 CHRONIC LEFT SI JOINT PAIN: ICD-10-CM

## 2022-10-25 DIAGNOSIS — E66.3 OVERWEIGHT: ICD-10-CM

## 2022-10-25 PROCEDURE — 99212 OFFICE O/P EST SF 10 MIN: CPT | Performed by: PREVENTIVE MEDICINE

## 2022-10-25 NOTE — PATIENT INSTRUCTIONS
Health Maintenance Due   Topic Date Due    ZOSTER VACCINE (1 of 2) Never done    PAP SMEAR  07/26/2019    COVID-19 Vaccine (3 - Booster for Moderna series) 06/17/2021    INFLUENZA VACCINE  Never done    Patient to ask cardiologist if she could prescribe Repatha  Patient to call medical records and tell them tpo Fax our records.    Check blood pressure cuff for accuracy and send 10 blood pressures over 2 weeks.  Watch sodium, alcohol and weight

## 2022-10-25 NOTE — PROGRESS NOTES
"Andre Carlos is a 57 y.o. female presents for   Chief Complaint   Patient presents with   • Consult     Disability Papers     Patient presents today for evaluation and filling out disability papers.  She has been disabled due to fracture of her left wrist and cartilage repair that has left her with a 5 pound weight lift push pull restriction of her left hand.  She also has chronic left SI joint discomfort with back pain and has problems with repeated back bending twisting and lifting.  She has been granted SSI and is here for evaluation for her private insurance company.  Her condition has not changed that I am aware of.  Patient also has hypertension hypercholesterol bulimiaDisorders of her thyroid pulmonary emphysema and lymphocytic colitis.  Health Maintenance Due   Topic Date Due   • ZOSTER VACCINE (1 of 2) Never done   • PAP SMEAR  07/26/2019   • COVID-19 Vaccine (3 - Booster for Moderna series) 06/17/2021   • INFLUENZA VACCINE  Never done       History of Present Illness     Vitals:    10/25/22 1027 10/25/22 1029   BP: 162/95 (!) 157/102   BP Location: Left arm Right arm   Patient Position: Sitting Sitting   Cuff Size: Adult Adult   Pulse: 68    Temp: 98.2 °F (36.8 °C)    SpO2: 95%    Weight: 73.8 kg (162 lb 12.8 oz)    Height: 160 cm (63\")      Body mass index is 28.84 kg/m².    Current Outpatient Medications on File Prior to Visit   Medication Sig Dispense Refill   • amLODIPine (NORVASC) 10 MG tablet Take 1 tablet by mouth Daily. 90 tablet 3   • atorvastatin (LIPITOR) 80 MG tablet Take 1 tablet by mouth Daily. 90 tablet 3   • chlorthalidone (HYGROTON) 25 MG tablet Take 0.5 tablets by mouth Daily. 45 tablet 3   • Evolocumab (REPATHA) solution prefilled syringe injection Inject 1 mL under the skin into the appropriate area as directed Every 14 (Fourteen) Days. 1 mL 5   • ezetimibe (Zetia) 10 MG tablet Take 1 tablet by mouth Daily. 90 tablet 3   • HYDROcodone-acetaminophen (NORCO)  MG " per tablet Take 1 tablet by mouth Every 6 (Six) Hours.  0   • tiZANidine (ZANAFLEX) 4 MG tablet Take 4 mg by mouth every night at bedtime.  5     Current Facility-Administered Medications on File Prior to Visit   Medication Dose Route Frequency Provider Last Rate Last Admin   • aspirin chewable tablet 81 mg  81 mg Oral Daily Diana Alonzo MD              allergies, current medications, past family history, past medical history, past social history, past surgical history and problem list.    Review of Systems   HENT: Positive for voice change.    Respiratory: Positive for shortness of breath.    Cardiovascular: Negative for chest pain.   Gastrointestinal: Positive for abdominal pain.   Musculoskeletal: Positive for arthralgias, back pain, gait problem, joint swelling and myalgias.       Objective   Physical Exam  Vitals reviewed.   Constitutional:       General: She is not in acute distress.     Appearance: She is well-developed. She is not ill-appearing or toxic-appearing.   HENT:      Head: Normocephalic and atraumatic.      Right Ear: Tympanic membrane, ear canal and external ear normal.      Left Ear: Tympanic membrane, ear canal and external ear normal.      Nose: Nose normal.   Eyes:      Extraocular Movements: Extraocular movements intact.      Conjunctiva/sclera: Conjunctivae normal.      Pupils: Pupils are equal, round, and reactive to light.   Cardiovascular:      Rate and Rhythm: Normal rate and regular rhythm.      Heart sounds: Normal heart sounds.   Pulmonary:      Effort: Pulmonary effort is normal.      Breath sounds: Normal breath sounds.   Abdominal:      General: Bowel sounds are normal. There is no distension.      Palpations: Abdomen is soft. There is no mass.      Tenderness: There is no abdominal tenderness.   Musculoskeletal:         General: Tenderness present.      Cervical back: Neck supple.      Comments: Decreased left hand .  Tenderness left SI joint   Skin:     General: Skin is  warm.   Neurological:      General: No focal deficit present.      Mental Status: She is alert and oriented to person, place, and time.   Psychiatric:         Mood and Affect: Mood normal.         Behavior: Behavior normal.       PHQ-9 Total Score:      Assessment & Plan   Diagnoses and all orders for this visit:    1. Pain of left hand (Primary)  Comments:  Left wrist fracture with multiple surgeries and cartilage tear that patient states has left her with a 5 pound weight lift push pull restriction.  She still is     2. Chronic left SI joint pain  Comments:  Patient also gives history of chronic left SI joint discomfort and does have problems with repeated heavy lifting squatting and twisting and has been on Social     3. Overweight    4. Overweight with body mass index (BMI) of 28 to 28.9 in adult        Patient Instructions     Health Maintenance Due   Topic Date Due   • ZOSTER VACCINE (1 of 2) Never done   • PAP SMEAR  07/26/2019   • COVID-19 Vaccine (3 - Booster for Moderna series) 06/17/2021   • INFLUENZA VACCINE  Never done    Patient to ask cardiologist if she could prescribe Repatha  Patient to call medical records and tell them tpo Fax our records.    Check blood pressure cuff for accuracy and send 10 blood pressures over 2 weeks.  Watch sodium, alcohol and weight

## 2022-11-13 NOTE — PATIENT INSTRUCTIONS
Health Maintenance Due   Topic Date Due    ZOSTER VACCINE (1 of 2) Never done    PAP SMEAR  07/26/2019    COVID-19 Vaccine (3 - Booster for Moderna series) 06/17/2021    INFLUENZA VACCINE  Never done    Check blood pressure cuff for accuracy and send 10 blood pressures over 2 weeks.  Watch sodium, alcohol and weight Check blood pressure cuff for accuracy and send 10 blood pressures over 2 weeks.  Watch sodium, alcohol and weight

## 2022-11-14 ENCOUNTER — OFFICE VISIT (OUTPATIENT)
Dept: FAMILY MEDICINE CLINIC | Facility: CLINIC | Age: 57
End: 2022-11-14

## 2022-11-14 VITALS
SYSTOLIC BLOOD PRESSURE: 163 MMHG | HEART RATE: 72 BPM | OXYGEN SATURATION: 91 % | TEMPERATURE: 98.2 F | DIASTOLIC BLOOD PRESSURE: 97 MMHG | WEIGHT: 168.2 LBS | HEIGHT: 63 IN | BODY MASS INDEX: 29.8 KG/M2

## 2022-11-14 DIAGNOSIS — E66.3 OVERWEIGHT WITH BODY MASS INDEX (BMI) OF 29 TO 29.9 IN ADULT: ICD-10-CM

## 2022-11-14 DIAGNOSIS — I10 PRIMARY HYPERTENSION: Primary | ICD-10-CM

## 2022-11-14 DIAGNOSIS — Z12.4 SCREENING FOR CERVICAL CANCER: ICD-10-CM

## 2022-11-14 PROCEDURE — 99213 OFFICE O/P EST LOW 20 MIN: CPT | Performed by: PREVENTIVE MEDICINE

## 2022-11-14 NOTE — PROGRESS NOTES
"Subjective   Melodie Carlos is a 57 y.o. female presents for   Chief Complaint   Patient presents with   • Follow-up     6w f/u on bp   • Back Pain   • Pain   • Hip Pain     Having pain all on her left side thinks its arthritis      Patient blood pressure still not under good control but she feels as though it may be at home she will home monitor and send us the results as we may need to add some losartan.  She was encouraged to get her Pap smear referral was placed she did not mention back or hip pain today  Health Maintenance Due   Topic Date Due   • ZOSTER VACCINE (1 of 2) Never done   • ANNUAL WELLNESS VISIT  Never done   • PAP SMEAR  07/26/2019   • COVID-19 Vaccine (3 - Booster for Moderna series) 06/17/2021   • INFLUENZA VACCINE  Never done       History of Present Illness     Vitals:    11/14/22 1022 11/14/22 1024   BP: 150/94 163/97   BP Location: Left arm Right arm   Patient Position: Sitting Sitting   Cuff Size: Adult Adult   Pulse: 72    Temp: 98.2 °F (36.8 °C)    TempSrc: Temporal    SpO2: 91%    Weight: 76.3 kg (168 lb 3.2 oz)    Height: 160 cm (62.99\")      Body mass index is 29.8 kg/m².    Current Outpatient Medications on File Prior to Visit   Medication Sig Dispense Refill   • amLODIPine (NORVASC) 10 MG tablet Take 1 tablet by mouth Daily. 90 tablet 3   • chlorthalidone (HYGROTON) 25 MG tablet Take 0.5 tablets by mouth Daily. 45 tablet 3   • ezetimibe (Zetia) 10 MG tablet Take 1 tablet by mouth Daily. 90 tablet 3   • HYDROcodone-acetaminophen (NORCO)  MG per tablet Take 1 tablet by mouth Every 6 (Six) Hours.  0   • tiZANidine (ZANAFLEX) 4 MG tablet Take 4 mg by mouth every night at bedtime.  5   • atorvastatin (LIPITOR) 80 MG tablet Take 1 tablet by mouth Daily. 90 tablet 3   • Evolocumab (REPATHA) solution prefilled syringe injection Inject 1 mL under the skin into the appropriate area as directed Every 14 (Fourteen) Days. 1 mL 5     Current Facility-Administered Medications on File Prior " to Visit   Medication Dose Route Frequency Provider Last Rate Last Admin   • aspirin chewable tablet 81 mg  81 mg Oral Daily Diana Alonzo MD           The following portions of the patient's history were reviewed and updated as appropriate: allergies, current medications, past family history, past medical history, past social history, past surgical history and problem list.    Review of Systems   Cardiovascular: Negative for chest pain.   Neurological: Negative for headache.       Objective   Physical Exam  Vitals reviewed.   Constitutional:       General: She is not in acute distress.     Appearance: She is well-developed. She is not ill-appearing or toxic-appearing.      Comments: Overweight     HENT:      Head: Normocephalic and atraumatic.      Right Ear: Tympanic membrane, ear canal and external ear normal.      Left Ear: Tympanic membrane, ear canal and external ear normal.      Nose: Nose normal.   Eyes:      Extraocular Movements: Extraocular movements intact.      Conjunctiva/sclera: Conjunctivae normal.      Pupils: Pupils are equal, round, and reactive to light.   Cardiovascular:      Rate and Rhythm: Normal rate and regular rhythm.      Heart sounds: Normal heart sounds.   Pulmonary:      Effort: Pulmonary effort is normal.      Breath sounds: Normal breath sounds.   Abdominal:      General: Bowel sounds are normal. There is no distension.      Palpations: Abdomen is soft. There is no mass.      Tenderness: There is no abdominal tenderness.   Musculoskeletal:         General: Normal range of motion.      Cervical back: Neck supple.   Skin:     General: Skin is warm.   Neurological:      General: No focal deficit present.      Mental Status: She is alert and oriented to person, place, and time.   Psychiatric:         Mood and Affect: Mood normal.         Behavior: Behavior normal.       PHQ-9 Total Score:      Assessment & Plan   Diagnoses and all orders for this visit:    1. Primary hypertension  (Primary)  Comments:  Blood pressures not under good control today here she will borrow her sister's cuff and home monitor and send us the results over the next 10 days.    2. Screening for cervical cancer  Comments:  Referral to GYN for Pap.  Orders:  -     Ambulatory Referral to Gynecology    3. Overweight with body mass index (BMI) of 29 to 29.9 in adult    Other orders  -     Cancel: FluLaval/Fluzone >6 mos ([id number])        Patient Instructions     Health Maintenance Due   Topic Date Due   • ZOSTER VACCINE (1 of 2) Never done   • PAP SMEAR  07/26/2019   • COVID-19 Vaccine (3 - Booster for Moderna series) 06/17/2021   • INFLUENZA VACCINE  Never done    Check blood pressure cuff for accuracy and send 10 blood pressures over 2 weeks.  Watch sodium, alcohol and weight Check blood pressure cuff for accuracy and send 10 blood pressures over 2 weeks.  Watch sodium, alcohol and weight

## 2022-11-22 ENCOUNTER — TELEPHONE (OUTPATIENT)
Dept: FAMILY MEDICINE CLINIC | Facility: CLINIC | Age: 57
End: 2022-11-22

## 2022-11-22 NOTE — TELEPHONE ENCOUNTER
Caller: Melodie Carlos    Relationship: Self    Best call back number: [phone]    What medication are you requesting:     What are your current symptoms: COUGHING UP A LOT OF YELLOW PHLEGM    How long have you been experiencing symptoms: 4 DAYS    Have you had these symptoms before:    [x] Yes  [] No    Have you been treated for these symptoms before:   [x] Yes  [] No    If a prescription is needed, what is your preferred pharmacy and phone number:  [facility]  [address] [place] [address] ROAD  [phone]    Additional notes: PATIENT IS CALLING IN STATING THAT SHE HAS BEEN COUGHING UP A LOT OF YELLOW PHLEGM SINCE Friday AND WANTS TO KNOW IF A MEDICATION CAN BE CALLED IN FOR HER.

## 2022-11-28 ENCOUNTER — OFFICE VISIT (OUTPATIENT)
Dept: FAMILY MEDICINE CLINIC | Facility: CLINIC | Age: 57
End: 2022-11-28

## 2022-11-28 VITALS
SYSTOLIC BLOOD PRESSURE: 152 MMHG | BODY MASS INDEX: 29.7 KG/M2 | HEIGHT: 63 IN | TEMPERATURE: 97 F | DIASTOLIC BLOOD PRESSURE: 90 MMHG | HEART RATE: 105 BPM | OXYGEN SATURATION: 95 % | WEIGHT: 167.6 LBS

## 2022-11-28 DIAGNOSIS — J10.1 INFLUENZA A: ICD-10-CM

## 2022-11-28 DIAGNOSIS — J01.00 ACUTE NON-RECURRENT MAXILLARY SINUSITIS: Primary | ICD-10-CM

## 2022-11-28 DIAGNOSIS — R05.1 ACUTE COUGH: ICD-10-CM

## 2022-11-28 LAB
EXPIRATION DATE: ABNORMAL
FLUAV AG UPPER RESP QL IA.RAPID: DETECTED
FLUBV AG UPPER RESP QL IA.RAPID: NOT DETECTED
INTERNAL CONTROL: ABNORMAL
Lab: ABNORMAL
SARS-COV-2 AG UPPER RESP QL IA.RAPID: NOT DETECTED

## 2022-11-28 PROCEDURE — 99213 OFFICE O/P EST LOW 20 MIN: CPT | Performed by: NURSE PRACTITIONER

## 2022-11-28 PROCEDURE — 87428 SARSCOV & INF VIR A&B AG IA: CPT | Performed by: NURSE PRACTITIONER

## 2022-11-28 RX ORDER — AMOXICILLIN 875 MG/1
875 TABLET, COATED ORAL 2 TIMES DAILY
Qty: 20 TABLET | Refills: 0 | Status: SHIPPED | OUTPATIENT
Start: 2022-11-28 | End: 2023-02-16

## 2022-11-28 RX ORDER — BENZONATATE 100 MG/1
100 CAPSULE ORAL 3 TIMES DAILY PRN
Qty: 30 CAPSULE | Refills: 1 | Status: SHIPPED | OUTPATIENT
Start: 2022-11-28 | End: 2023-03-09

## 2022-11-28 NOTE — PROGRESS NOTES
"Andre Carlos is a 57 y.o. female presents for   Chief Complaint   Patient presents with   • Fever   • Diarrhea   • Cough   • Headache   • Generalized Body Aches   • URI     Been going on 2 weeks, Wednesday grand-baby was dx with flu A       Health Maintenance Due   Topic Date Due   • ZOSTER VACCINE (1 of 2) Never done   • ANNUAL WELLNESS VISIT  Never done   • PAP SMEAR  07/26/2019   • COVID-19 Vaccine (3 - Booster for Moderna series) 06/17/2021   • INFLUENZA VACCINE  Never done       History of Present Illness   Pt present with concerns for URI and flu exposure.  She states she has been feeling ill x 2 weeks with cough, congestion and headache.  She states she was exposed to the flu last Tuesday and reports the past few days she has been experiencing fever, body aches and diarrhea.  She has continued to take her medications as directed and reports her BP has been elevated since feeling ill.  Pt instructed to continue to monitor BP as it is likely elevated due to current illness, but if it continues to run high once she is feeling better, will adjust medications.     Vitals:    11/28/22 1146 11/28/22 1201   BP: (!) 151/101 152/90   BP Location: Left arm Left arm   Patient Position: Sitting Sitting   Cuff Size: Adult    Pulse: 105    Temp: 97 °F (36.1 °C)    TempSrc: Temporal    SpO2: 95%    Weight: 76 kg (167 lb 9.6 oz)    Height: 160 cm (62.99\")      Body mass index is 29.7 kg/m².    Current Outpatient Medications on File Prior to Visit   Medication Sig Dispense Refill   • amLODIPine (NORVASC) 10 MG tablet Take 1 tablet by mouth Daily. 90 tablet 3   • atorvastatin (LIPITOR) 80 MG tablet Take 1 tablet by mouth Daily. 90 tablet 3   • chlorthalidone (HYGROTON) 25 MG tablet Take 0.5 tablets by mouth Daily. 45 tablet 3   • HYDROcodone-acetaminophen (NORCO)  MG per tablet Take 1 tablet by mouth Every 6 (Six) Hours.  0   • tiZANidine (ZANAFLEX) 4 MG tablet Take 4 mg by mouth every night at bedtime.  " 5   • Evolocumab (REPATHA) solution prefilled syringe injection Inject 1 mL under the skin into the appropriate area as directed Every 14 (Fourteen) Days. 1 mL 5   • ezetimibe (Zetia) 10 MG tablet Take 1 tablet by mouth Daily. 90 tablet 3     Current Facility-Administered Medications on File Prior to Visit   Medication Dose Route Frequency Provider Last Rate Last Admin   • aspirin chewable tablet 81 mg  81 mg Oral Daily Diana Alonzo MD           The following portions of the patient's history were reviewed and updated as appropriate: allergies, current medications, past family history, past medical history, past social history, past surgical history, and problem list.    Review of Systems   Constitutional: Positive for chills. Negative for fever.   HENT: Negative for sinus pressure and sore throat.    Eyes: Negative for blurred vision.   Respiratory: Positive for cough. Negative for shortness of breath.    Cardiovascular: Negative for chest pain.   Gastrointestinal: Negative for abdominal pain.   Musculoskeletal: Negative for arthralgias and joint swelling.        Body aches from coughing   Skin: Negative for color change.   Neurological: Positive for headache. Negative for dizziness.   Psychiatric/Behavioral: Negative for behavioral problems.       Objective   Physical Exam  Vitals and nursing note reviewed.   Constitutional:       Appearance: Normal appearance. She is well-developed. She is ill-appearing.   HENT:      Head: Normocephalic and atraumatic.      Right Ear: Ear canal and external ear normal. A middle ear effusion is present.      Left Ear: External ear normal.      Nose: Mucosal edema present.      Right Sinus: Maxillary sinus tenderness and frontal sinus tenderness present.      Left Sinus: Maxillary sinus tenderness and frontal sinus tenderness present.      Mouth/Throat:      Pharynx: Posterior oropharyngeal erythema present.      Tonsils: 3+ on the right. 3+ on the left.   Eyes:       Extraocular Movements: Extraocular movements intact.      Pupils: Pupils are equal, round, and reactive to light.   Cardiovascular:      Rate and Rhythm: Regular rhythm. Tachycardia present.      Pulses: Normal pulses.      Heart sounds: Normal heart sounds.   Pulmonary:      Effort: Pulmonary effort is normal.      Breath sounds: Rhonchi (loose throughout) present.   Abdominal:      General: Bowel sounds are normal.      Palpations: Abdomen is soft.   Genitourinary:     Vagina: Normal.   Musculoskeletal:         General: Normal range of motion.      Cervical back: Normal range of motion and neck supple.   Lymphadenopathy:      Cervical: Cervical adenopathy present.   Skin:     General: Skin is warm and dry.   Neurological:      General: No focal deficit present.      Mental Status: She is alert and oriented to person, place, and time.   Psychiatric:         Mood and Affect: Mood normal.         Behavior: Behavior normal.         Judgment: Judgment normal.       PHQ-9 Total Score:      Assessment & Plan   Diagnoses and all orders for this visit:    1. Acute non-recurrent maxillary sinusitis (Primary)  -     amoxicillin (AMOXIL) 875 MG tablet; Take 1 tablet by mouth 2 (Two) Times a Day.  Dispense: 20 tablet; Refill: 0    2. Acute cough  -     POCT SARS-CoV-2 Antigen CECILIO + Flu  -     benzonatate (Tessalon Perles) 100 MG capsule; Take 1 capsule by mouth 3 (Three) Times a Day As Needed for Cough.  Dispense: 30 capsule; Refill: 1    3. Influenza A  Comments:  sx x 5 days at this time, likely no benefit from tamilfu.  instructed to quarantine until fever free x 24 hours.  call for worsening sx        There are no Patient Instructions on file for this visit.

## 2022-12-29 NOTE — PATIENT INSTRUCTIONS
Health Maintenance Due   Topic Date Due    ZOSTER VACCINE (1 of 2) Never done    ANNUAL WELLNESS VISIT  Never done    PAP SMEAR  07/26/2019    COVID-19 Vaccine (3 - Booster for Moderna series) 06/17/2021    INFLUENZA VACCINE  Never done    Patient to visit [employer] website (https://www.ibanet.org/) for information reguarding advanced directive and living will.  Advance Directive       Advance directives are legal documents that let you make choices ahead of time about your health care and medical treatment in case you become unable to communicate for yourself. Advance directives are a way for you to communicate your wishes to family, friends, and health care providers. This can help convey your decisions about end-of-life care if you become unable to communicate.  Discussing and writing advance directives should happen over time rather than all at once. Advance directives can be changed depending on your situation and what you want, even after you have signed the advance directives.  If you do not have an advance directive, some states assign family decision makers to act on your behalf based on how closely you are related to them. Each state has its own laws regarding advance directives. You may want to check with your health care provider, , or state representative about the laws in your state. There are different types of advance directives, such as:  Medical power of .  Living will.  Do not resuscitate (DNR) or do not attempt resuscitation (DNAR) order.  Health care proxy and medical power of   A health care proxy, also called a health care agent, is a person who is appointed to make medical decisions for you in cases in which you are unable to make the decisions yourself. Generally, people choose someone they know well and trust to represent their preferences. Make sure to ask this person for an agreement to act as your proxy. A proxy may have to exercise judgment in the  event of a medical decision for which your wishes are not known.  A medical power of  is a legal document that names your health care proxy. Depending on the laws in your state, after the document is written, it may also need to be:  Signed.  Notarized.  Dated.  Copied.  Witnessed.  Incorporated into your medical record.  You may also want to appoint someone to manage your financial affairs in a situation in which you are unable to do so. This is called a durable power of  for finances. It is a separate legal document from the durable power of  for health care. You may choose the same person or someone different from your health care proxy to act as your agent in financial matters.  If you do not appoint a proxy, or if there is a concern that the proxy is not acting in your best interests, a court-appointed guardian may be designated to act on your behalf.  Living will  A living will is a set of instructions documenting your wishes about medical care when you cannot express them yourself. Health care providers should keep a copy of your living will in your medical record. You may want to give a copy to family members or friends. To alert caregivers in case of an emergency, you can place a card in your wallet to let them know that you have a living will and where they can find it. A living will is used if you become:  Terminally ill.  Incapacitated.  Unable to communicate or make decisions.  Items to consider in your living will include:  The use or non-use of life-sustaining equipment, such as dialysis machines and breathing machines (ventilators).  A DNR or DNAR order, which is the instruction not to use cardiopulmonary resuscitation (CPR) if breathing or heartbeat stops.  The use or non-use of tube feeding.  Withholding of food and fluids.  Comfort (palliative) care when the goal becomes comfort rather than a cure.  Organ and tissue donation.  A living will does not give instructions for  distributing your money and property if you should pass away. It is recommended that you seek the advice of a  when writing a will. Decisions about taxes, beneficiaries, and asset distribution will be legally binding. This process can relieve your family and friends of any concerns surrounding disputes or questions that may come up about the distribution of your assets.  DNR or DNAR  A DNR or DNAR order is a request not to have CPR in the event that your heart stops beating or you stop breathing. If a DNR or DNAR order has not been made and shared, a health care provider will try to help any patient whose heart has stopped or who has stopped breathing. If you plan to have surgery, talk with your health care provider about how your DNR or DNAR order will be followed if problems occur.  Summary  Advance directives are the legal documents that allow you to make choices ahead of time about your health care and medical treatment in case you become unable to communicate for yourself.  The process of discussing and writing advance directives should happen over time. You can change the advance directives, even after you have signed them.  Advance directives include DNR or DNAR orders, living chaves, and designating an agent as your medical power of .  This information is not intended to replace advice given to you by your health care provider. Make sure you discuss any questions you have with your health care provider.  Document Released: 03/26/2009 Document Revised: 11/06/2017 Document Reviewed: 11/06/2017  [employer] Patient Education © 2019 [employer]    12 hour fast for labs

## 2022-12-29 NOTE — PROGRESS NOTES
The ABCs of the Annual Wellness Visit  Subsequent Medicare Wellness Visit    Subjective    History of Present Illness:  Melodie Carlos is a 57 y.o. female who presents for a Subsequent Medicare Wellness Visit.    The following portions of the patient's history were reviewed and   updated as appropriate: allergies, current medications, past family history, past medical history, past social history, past surgical history and problem list.    Compared to one year ago, the patient feels her physical   health is the same.    Compared to one year ago, the patient feels her mental   health is the same.    Recent Hospitalizations:  She was not admitted to the hospital during the last year.       Current Medical Providers:  Patient Care Team:  Isis Franklin MD as PCP - General  [facility], Rigo Tan MD as Consulting Physician (Gastroenterology)  Diana Alonzo MD as Consulting Physician (Interventional Cardiology)    Outpatient Medications Prior to Visit   Medication Sig Dispense Refill   • amLODIPine (NORVASC) 10 MG tablet Take 1 tablet by mouth Daily. 90 tablet 3   • atorvastatin (LIPITOR) 80 MG tablet Take 1 tablet by mouth Daily. 90 tablet 3   • chlorthalidone (HYGROTON) 25 MG tablet Take 0.5 tablets by mouth Daily. 45 tablet 3   • ezetimibe (Zetia) 10 MG tablet Take 1 tablet by mouth Daily. 90 tablet 3   • HYDROcodone-acetaminophen (NORCO)  MG per tablet Take 1 tablet by mouth Every 6 (Six) Hours.  0   • tiZANidine (ZANAFLEX) 4 MG tablet Take 4 mg by mouth every night at bedtime.  5   • amoxicillin (AMOXIL) 875 MG tablet Take 1 tablet by mouth 2 (Two) Times a Day. 20 tablet 0   • benzonatate (Tessalon Perles) 100 MG capsule Take 1 capsule by mouth 3 (Three) Times a Day As Needed for Cough. 30 capsule 1   • Evolocumab (REPATHA) solution prefilled syringe injection Inject 1 mL under the skin into the appropriate area as directed Every 14 (Fourteen) Days. 1 mL 5     Facility-Administered Medications  Prior to Visit   Medication Dose Route Frequency Provider Last Rate Last Admin   • aspirin chewable tablet 81 mg  81 mg Oral Daily Diana Alonzo MD           Opioid medication/s are on active medication list.  and I have evaluated her active treatment plan and pain score trends (see table).  Vitals:    12/30/22 0911   PainSc:   8   PainLoc: Hip  Comment: left hip     I have reviewed the chart for potential of high risk medication and harmful drug interactions in the elderly.            Aspirin is on active medication list. Aspirin use is indicated based on review of current medical condition/s. Pros and cons of this therapy have been discussed today. Benefits of this medication outweigh potential harm.  Patient has been encouraged to continue taking this medication.  .    Patient Active Problem List   Diagnosis   • Hyperlipidemia   • Hypertension   • Disorder of thyroid   • Anemia   • Arthritis   • Body mass index 25.0-25.9, adult   • Overweight   • Family history of malignant neoplasm of trachea, bronchus and lung   • Fibromyalgia   • Former smoker   • Hematuria   • Low back pain   • Lymphocytic colitis   • Neck pain, chronic   • Nonspecific abnormal results of function study of kidney   • Unilateral inguinal hernia without obstruction or gangrene   • Vitamin D deficiency   • Acute conjunctivitis   • Conjunctivitis due to herpes simplex virus (HSV)   • Herpes zoster keratitis of left eye   • Presbyopia   • COVID-19   • H/O pulmonary emphysema   • Disorder of intervertebral disc of lumbar spine   • Other insomnia   • Family history of throat cancer   • Chronic left SI joint pain   • Heart murmur   • Pain of left hand   • Class 1 obesity due to excess calories with serious comorbidity and body mass index (BMI) of 30.0 to 30.9 in adult     Advance Care Planning  Advance Directive is not on file.  ACP discussion was held with the patient during this visit. Patient does not have an advance directive, information  "provided.     Objective    Vitals:    12/30/22 0911 12/30/22 0913   BP: 130/87 141/96   BP Location: Left arm Right arm   Patient Position: Sitting Sitting   Cuff Size: Adult Adult   Pulse: 82    Temp: 97.1 °F (36.2 °C)    TempSrc: Temporal    SpO2: 99%    Weight: 78.4 kg (172 lb 12.8 oz)    Height: 160 cm (62.99\")    PainSc:   8    PainLoc: Hip  Comment: left hip      Estimated body mass index is 30.62 kg/m² as calculated from the following:    Height as of this encounter: 160 cm (62.99\").    Weight as of this encounter: 78.4 kg (172 lb 12.8 oz).    BMI is >= 25 and <30. (Overweight) The following options were offered after discussion;: exercise counseling/recommendations and nutrition counseling/recommendations      Does the patient have evidence of cognitive impairment? No          HEALTH RISK ASSESSMENT    Smoking Status:  Social History     Tobacco Use   Smoking Status Former   • Packs/day: 2.00   • Years: 35.00   • Pack years: 70.00   • Types: Cigarettes   • Quit date: 2013   • Years since quitting: 10.0   • Passive exposure: Past   Smokeless Tobacco Never     Alcohol Consumption:  Social History     Substance and Sexual Activity   Alcohol Use Yes    Comment: social     Fall Risk Screen:    [facility].    Depression Screening:  PHQ-2/PHQ-9 Depression Screening 12/30/2022   Retired PHQ-9 Total Score -   Retired Total Score -   Little Interest or Pleasure in Doing Things 0-->not at all   Feeling Down, Depressed or Hopeless 0-->not at all   PHQ-9: Brief Depression Severity Measure Score 0       Health Habits and Functional and Cognitive Screening:  Functional & Cognitive Status 12/30/2022   Do you have difficulty preparing food and eating? No   Do you have difficulty bathing yourself, getting dressed or grooming yourself? No   Do you have difficulty using the toilet? No   Do you have difficulty moving around from place to place? No   Do you have trouble with steps or " getting out of a bed or a chair? No   Current Diet Well Balanced Diet   Dental Exam Up to date   Eye Exam Up to date   Exercise (times per week) 7 times per week   Do you need help using the phone?  No   Are you deaf or do you have serious difficulty hearing?  No   Do you need help with transportation? No   Do you need help shopping? No   Do you need help preparing meals?  No   Do you need help with housework?  No   Do you need help with laundry? No   Do you need help taking your medications? No   Do you need help managing money? No   Do you ever drive or ride in a car without wearing a seat belt? No   Have you felt unusual stress, anger or loneliness in the last month? No   Who do you live with? Alone   If you need help, do you have trouble finding someone available to you? No   Do you have difficulty concentrating, remembering or making decisions? No       Age-appropriate Screening Schedule:  Refer to the list below for future screening recommendations based on patient's age, sex and/or medical conditions. Orders for these recommended tests are listed in the plan section. The patient has been provided with a written plan.    Health Maintenance   Topic Date Due   • ZOSTER VACCINE (1 of 2) Never done   • PAP SMEAR  07/26/2019   • INFLUENZA VACCINE  Never done   • MAMMOGRAM  03/24/2023   • LIPID PANEL  08/25/2023   • TDAP/TD VACCINES (2 - Td or Tdap) 09/14/2026                CMS Preventative Services Quick Reference  Risk Factors Identified During Encounter  None Identified  The above risks/problems have been discussed with the patient.  Follow up actions/plans if indicated are seen below in the Assessment/Plan Section.  Pertinent information has been shared with the patient in the After Visit Summary.  An After Visit Summary and PPPS were made available to the patient.    Follow Up:   Next Medicare Wellness visit to be scheduled in 1 year.         Additional E&M Note during same encounter follows:  Patient has  "multiple medical problems which are significant and separately identifiable that require additional work above and beyond the Medicare Wellness Visit.        Chief Complaint  Hip Pain (Left hip pain for two days )  Patient also presents for an age-specific physical and has been advised to wear sunscreen and a seatbelt.  She is also here to check on multiple chronic health conditions most of which are stable.  She is having new problems with her chronic left SI joint discomfort with pain centering about the left iliac crest.  It does radiate vaguely down into the legs but we could not detect any sciatic symptoms today.  Range of motion of the LS spine was approximately 75% in all directions patient can toe and heel walk with some difficulty Trendelenburg was negative.  She did have pain with 50% internal and external rotation of the left hip and with 30 degrees abduction of the left hip.  Pain was still centered about the left SI joint.  Patient is also been discussing problems falling asleep and staying asleep so we have referred her back to the sleep doctor.  Subjective        HPI  Melodie Carlos also presents   Chief Complaint   Patient presents with   • Hip Pain     Left hip pain for two days    .    Review of Systems   Musculoskeletal: Positive for arthralgias, back pain and gait problem.   Psychiatric/Behavioral: Positive for sleep disturbance.       Objective   Vital Signs:  /96 (BP Location: Right arm, Patient Position: Sitting, Cuff Size: Adult)   Pulse 82   Temp 97.1 °F (36.2 °C) (Temporal)   Ht 160 cm (62.99\")   Wt 78.4 kg (172 lb 12.8 oz)   SpO2 99%   BMI 30.62 kg/m²     Physical Exam  Vitals reviewed.   Constitutional:       General: She is not in acute distress.     Appearance: She is well-developed. She is obese. She is not ill-appearing or toxic-appearing.   HENT:      Head: Normocephalic and atraumatic.      Right Ear: Tympanic membrane, ear canal and external ear normal.      Left " Ear: Tympanic membrane, ear canal and external ear normal.      Nose: Nose normal.      Mouth/Throat:      Mouth: Mucous membranes are moist.      Pharynx: No posterior oropharyngeal erythema.   Eyes:      Extraocular Movements: Extraocular movements intact.      Conjunctiva/sclera: Conjunctivae normal.      Pupils: Pupils are equal, round, and reactive to light.   Cardiovascular:      Rate and Rhythm: Normal rate and regular rhythm.      Heart sounds: Normal heart sounds.   Pulmonary:      Effort: Pulmonary effort is normal.      Comments: Decreased breath sounds bilaterally  Abdominal:      General: Bowel sounds are normal. There is no distension.      Palpations: Abdomen is soft. There is no mass.      Tenderness: There is no abdominal tenderness.   Musculoskeletal:         General: Tenderness present. No signs of injury.      Cervical back: Neck supple.      Comments: Tenderness over the left iliac crest.  No pain with AP compression against the table did have decreased range of motion of the left hip Trendelenburg was negative.  Motor neurovascularly the feet were both intact.   Skin:     General: Skin is warm.   Neurological:      General: No focal deficit present.      Mental Status: She is alert and oriented to person, place, and time.   Psychiatric:         Mood and Affect: Mood normal.         Behavior: Behavior normal.                         Assessment and Plan   Diagnoses and all orders for this visit:    1. Encounter for annual general medical examination with abnormal findings in adult (Primary)  Comments:  Patient has been advised to wear sunscreen and a seatbelt.    2. Chronic left SI joint pain  Comments:  Patient cannot get pain-free at rest from the left sacroiliac and left iliac crest pain for the last several weeks.  No injury.  Orders:  -     XR Spine Lumbar Complete 4+VW; Future  -     XR Hip With or Without Pelvis 2 - 3 View Left; Future  -     Cancel: XR Pelvis 1 or 2 View; Future    3.  Screening for cervical cancer  Comments:  Will call for PAP    4. Hyperlipidemia, unspecified hyperlipidemia type  Comments:  Patient has not been watching her saturated fats  Orders:  -     Lipid Panel; Future    5. Primary hypertension  Comments:  Elevated but having pain today  Orders:  -     CBC Auto Differential; Future  -     Comprehensive Metabolic Panel; Future    6. Disorder of thyroid  Comments:  No increase in dry skin or hair loss  Orders:  -     TSH; Future    7. Anemia, unspecified type  Comments:  No blood loss  Orders:  -     Vitamin B12; Future    8. Hematuria, unspecified type  Comments:  Patient has not noted any blood in her urine  Orders:  -     POC Urinalysis Dipstick, Automated    9. Lymphocytic colitis  Comments:  Swallowing digestion have been good    10. H/O pulmonary emphysema  Comments:  No recent breathing problems    11. Subacute vaginitis  Comments:  Resolved.    12. Sleep disorder  Comments:  Is having trouble with falling asleep and staying asleep for longer than 2 hours at a time will refer to sleep doctor  Orders:  -     Ambulatory Referral to Sleep Medicine    13. Class 1 obesity due to excess calories with serious comorbidity and body mass index (BMI) of 30.0 to 30.9 in adult             Follow Up   No follow-ups on file.  Patient was given instructions and counseling regarding her condition or for health maintenance advice. Please see specific information pulled into the AVS if appropriate.

## 2022-12-30 ENCOUNTER — HOSPITAL ENCOUNTER (OUTPATIENT)
Dept: GENERAL RADIOLOGY | Facility: HOSPITAL | Age: 57
Discharge: HOME | End: 2022-12-30

## 2022-12-30 ENCOUNTER — OFFICE VISIT (OUTPATIENT)
Dept: FAMILY MEDICINE CLINIC | Facility: CLINIC | Age: 57
End: 2022-12-30

## 2022-12-30 ENCOUNTER — TELEPHONE (OUTPATIENT)
Dept: FAMILY MEDICINE CLINIC | Facility: CLINIC | Age: 57
End: 2022-12-30

## 2022-12-30 VITALS
DIASTOLIC BLOOD PRESSURE: 96 MMHG | TEMPERATURE: 97.1 F | BODY MASS INDEX: 30.62 KG/M2 | WEIGHT: 172.8 LBS | OXYGEN SATURATION: 99 % | HEART RATE: 82 BPM | HEIGHT: 63 IN | SYSTOLIC BLOOD PRESSURE: 141 MMHG

## 2022-12-30 DIAGNOSIS — N76.1 SUBACUTE VAGINITIS: ICD-10-CM

## 2022-12-30 DIAGNOSIS — Z12.4 SCREENING FOR CERVICAL CANCER: ICD-10-CM

## 2022-12-30 DIAGNOSIS — R31.9 HEMATURIA, UNSPECIFIED TYPE: ICD-10-CM

## 2022-12-30 DIAGNOSIS — I10 PRIMARY HYPERTENSION: ICD-10-CM

## 2022-12-30 DIAGNOSIS — G89.29 CHRONIC LEFT SI JOINT PAIN: ICD-10-CM

## 2022-12-30 DIAGNOSIS — M53.3 CHRONIC LEFT SI JOINT PAIN: ICD-10-CM

## 2022-12-30 DIAGNOSIS — K52.832 LYMPHOCYTIC COLITIS: ICD-10-CM

## 2022-12-30 DIAGNOSIS — E78.5 HYPERLIPIDEMIA, UNSPECIFIED HYPERLIPIDEMIA TYPE: ICD-10-CM

## 2022-12-30 DIAGNOSIS — Z87.09 H/O PULMONARY EMPHYSEMA: ICD-10-CM

## 2022-12-30 DIAGNOSIS — G47.9 SLEEP DISORDER: ICD-10-CM

## 2022-12-30 DIAGNOSIS — E66.09 CLASS 1 OBESITY DUE TO EXCESS CALORIES WITH SERIOUS COMORBIDITY AND BODY MASS INDEX (BMI) OF 30.0 TO 30.9 IN ADULT: ICD-10-CM

## 2022-12-30 DIAGNOSIS — E07.9 DISORDER OF THYROID: ICD-10-CM

## 2022-12-30 DIAGNOSIS — Z00.01 ENCOUNTER FOR ANNUAL GENERAL MEDICAL EXAMINATION WITH ABNORMAL FINDINGS IN ADULT: Primary | ICD-10-CM

## 2022-12-30 DIAGNOSIS — D64.9 ANEMIA, UNSPECIFIED TYPE: ICD-10-CM

## 2022-12-30 PROBLEM — E66.811 CLASS 1 OBESITY DUE TO EXCESS CALORIES WITH SERIOUS COMORBIDITY AND BODY MASS INDEX (BMI) OF 30.0 TO 30.9 IN ADULT: Status: ACTIVE | Noted: 2022-10-25

## 2022-12-30 LAB
BILIRUB BLD-MCNC: NEGATIVE MG/DL
CLARITY, POC: CLEAR
COLOR UR: YELLOW
EXPIRATION DATE: NORMAL
GLUCOSE UR STRIP-MCNC: NEGATIVE MG/DL
KETONES UR QL: NEGATIVE
LEUKOCYTE EST, POC: NEGATIVE
Lab: NORMAL
NITRITE UR-MCNC: NEGATIVE MG/ML
PH UR: 7 [PH] (ref 5–8)
PROT UR STRIP-MCNC: NEGATIVE MG/DL
RBC # UR STRIP: NEGATIVE /UL
SP GR UR: 1.03 (ref 1–1.03)
UROBILINOGEN UR QL: NORMAL

## 2022-12-30 PROCEDURE — 96160 PT-FOCUSED HLTH RISK ASSMT: CPT | Performed by: PREVENTIVE MEDICINE

## 2022-12-30 PROCEDURE — 99396 PREV VISIT EST AGE 40-64: CPT | Performed by: PREVENTIVE MEDICINE

## 2022-12-30 PROCEDURE — 73502 X-RAY EXAM HIP UNI 2-3 VIEWS: CPT

## 2022-12-30 PROCEDURE — G0439 PPPS, SUBSEQ VISIT: HCPCS | Performed by: PREVENTIVE MEDICINE

## 2022-12-30 PROCEDURE — 81003 URINALYSIS AUTO W/O SCOPE: CPT | Performed by: PREVENTIVE MEDICINE

## 2022-12-30 PROCEDURE — 72110 X-RAY EXAM L-2 SPINE 4/>VWS: CPT

## 2022-12-30 PROCEDURE — 1170F FXNL STATUS ASSESSED: CPT | Performed by: PREVENTIVE MEDICINE

## 2022-12-30 PROCEDURE — 1125F AMNT PAIN NOTED PAIN PRSNT: CPT | Performed by: PREVENTIVE MEDICINE

## 2022-12-30 PROCEDURE — 99213 OFFICE O/P EST LOW 20 MIN: CPT | Performed by: PREVENTIVE MEDICINE

## 2022-12-30 PROCEDURE — 1159F MED LIST DOCD IN RCRD: CPT | Performed by: PREVENTIVE MEDICINE

## 2022-12-30 NOTE — TELEPHONE ENCOUNTER
HUB TO READ:      ----- Message from Isis Franklin MD sent at 12/30/2022  4:55 PM EST -----  Left hip x-ray showed no acute bony abnormality there was some blockages or calcifications and blood vessels call if any other questions or concerns

## 2022-12-30 NOTE — TELEPHONE ENCOUNTER
HUB TO READ:  ----- Message from Isis Franklin MD sent at 12/30/2022  4:54 PM EST -----  Urine today was normal.

## 2022-12-30 NOTE — PROGRESS NOTES
Left hip x-ray showed no acute bony abnormality there was some blockages or calcifications and blood vessels call if any other questions or concerns

## 2023-01-03 ENCOUNTER — TELEPHONE (OUTPATIENT)
Dept: FAMILY MEDICINE CLINIC | Facility: CLINIC | Age: 58
End: 2023-01-03
Payer: MEDICARE

## 2023-01-03 NOTE — TELEPHONE ENCOUNTER
HUB RELAYED MESSAGE    Caller: Mal Carlos    Relationship: Self    Best call back number:    [phone]        What is the best time to reach you: ANYTIME     Who are you requesting to speak with (clinical staff, provider,  specific staff member): CLINICAL     Do you know the name of the person who called: NITHIN     What was the call regarding: MAL IS NOT WANTING TO DO PHYSICAL THERAPY, XRAY SHOWS TUMOR ON LEFT SIDE, WITH BLOCKAGE. SHE IS STILL IN A LOT OF PAIN, SHE IS REQUESTING MRI. PAIN MEDICATION IS NOT HELPING WITH PAIN     SHE WOULD LIKE TO BE SCHEDULED FOR MORE TESTING TODAY, IF POSSIBLE    PLEASE ADVISE     Do you require a callback: YES

## 2023-01-03 NOTE — TELEPHONE ENCOUNTER
HUB TO READ:  ----- Message from Isis Franklin MD sent at 1/3/2023  7:14 AM EST -----    Low back x-rays are similar compared to the prior exam from [facility] can start PT if she would like let me know and I will order.

## 2023-01-04 ENCOUNTER — TELEPHONE (OUTPATIENT)
Dept: FAMILY MEDICINE CLINIC | Facility: CLINIC | Age: 58
End: 2023-01-04
Payer: MEDICARE

## 2023-01-04 NOTE — TELEPHONE ENCOUNTER
There was calcifications in the blood vessels in the lower portion of her pelvis.  But no masses or tumors there was some stool in her colon.  Even if a referral is requested for an MRI [employer] you generally makes a patient do 6 weeks of physical therapy or home exercises before granting that.  Let me know what she decides she wants to do.

## 2023-01-04 NOTE — TELEPHONE ENCOUNTER
Caller: Melodie Carlos    Relationship: Self    Best call back number: [phone]    What is the best time to reach you: ANYTIME    Who are you requesting to speak with (clinical staff, provider,  specific staff member): CLINICAL    What was the call regarding: PATIENT STATES SHE CALLED YESTERDAY AND IS REQUESTING A MRI ORDER AS SOON AS POSSIBLE.     PATIENT STATES SHE THINKS THE RESULTS SHOWED A TUMOR AND BLOCKAGE ON HER HIP.     PATIENT IS REQUESTING A CALL BACK AS SOON AS POSSIBLE.

## 2023-01-06 NOTE — PROGRESS NOTES
Sleep medicine follow-up visit    Melodie Carlos   1965  58 y.o. female   DATE OF SERVICE: 1/9/2023   She didn't get the hst done  Still unable to sleep   Still is taking tizanidine only sleeps one hour at a time.   She only sleeps 3-4 hours a night.       SLEEP TESTING HISTORY: The patient has not completed testing    Review of Systems   Constitutional: Positive for fatigue. Negative for fever.   HENT: Negative.    Eyes: Negative.    Respiratory: Negative for cough and shortness of breath.    Cardiovascular: Negative for chest pain.   Gastrointestinal: Negative for abdominal pain and nausea.   Endocrine: Negative.    Genitourinary: Negative.    Musculoskeletal: Positive for back pain. Negative for neck pain.   Neurological: Negative for dizziness and light-headedness.   Psychiatric/Behavioral: Positive for sleep disturbance. Negative for agitation and confusion.     I reviewed and addressed ROS entered by MA.    History of Present Illness  The patient presented today for a sleep follow-up.  She states she was unable to complete sleep testing because she was unaware of how this would be completed.  I discussed in detail, home study is completed and the hospital study is completed.  The patient states she is up and down throughout the night and was concerned about this with the study.  She states she does have a Ambien to take with a home study.  The patient was notified that we would need a minimum of 4 to 6 hours of sleep to get a good sleep study.  Also if she gets up during the night but goes back to sleep she could plug the equipment back in.  The patient states she is agreeable with undergoing sleep testing.    She states she does have other issues affecting her sleep which consist of #1 hot flashes #2 abdominal pain #3 urination throughout the night and #4 waking up with a very dry mouth.  She last saw Dr. Seiple in March 2022 and was ordered a sleep test.    Sleep history  Pt falls asleep in about 15  min.   Pt has trouble staying sleep, wakes up about 1 or 2, then lays in bed for 2-3 hours then falls asleep for hour or two.    The patient c/o daytime sleepiness issues:  no  There is no history of hypnagogic hallucinations, sleep paralysis or cataplexy.  The patient complains of snoring has dry mouth or sore mouth when he wakes up. Snores maybe in all positions, wakes self up snoring,   The patient complains of Leg symptoms: no. No discomfort in legs when falling asleep  The patient complains of problems with insomnia:restless  Sleep schedule: Bedtime:10 , gets out of bed at varies, sleep latency: 30min, Gets about 4 hours of sleep. Used to sleep all night from 9pm to 5am since she has not  been working having trouble with sleep     The following portions of the patient's history were reviewed and updated as appropriate: allergies, current medications, past family history, past medical history, past social history, past surgical history and problem list.      Family History   Problem Relation Age of Onset   • Lung cancer Mother    • Heart disease Father    • No Known Problems Sister    • No Known Problems Brother    • No Known Problems Brother        Past Medical History:   Diagnosis Date   • Anemia    • Cerebrovascular accident (CVA) (HCC) 3/8/2012    x2   • Hyperlipidemia    • Hypertension        Social History     Socioeconomic History   • Marital status: Single   • Number of children: 1   Tobacco Use   • Smoking status: Former     Packs/day: 2.00     Years: 35.00     Pack years: 70.00     Types: Cigarettes     Quit date: 2013     Years since quitting: 10.0     Passive exposure: Past   • Smokeless tobacco: Never   Vaping Use   • Vaping Use: Never used   Substance and Sexual Activity   • Alcohol use: Yes     Comment: social   • Drug use: No   • Sexual activity: Not Currently         Current Outpatient Medications:   •  amLODIPine (NORVASC) 10 MG tablet, Take 1 tablet by mouth Daily., Disp: 90 tablet, Rfl: 3  •   atorvastatin (LIPITOR) 80 MG tablet, Take 1 tablet by mouth Daily., Disp: 90 tablet, Rfl: 3  •  chlorthalidone (HYGROTON) 25 MG tablet, Take 0.5 tablets by mouth Daily., Disp: 45 tablet, Rfl: 3  •  ezetimibe (Zetia) 10 MG tablet, Take 1 tablet by mouth Daily., Disp: 90 tablet, Rfl: 3  •  HYDROcodone-acetaminophen (NORCO)  MG per tablet, Take 1 tablet by mouth Every 6 (Six) Hours., Disp: , Rfl: 0  •  tiZANidine (ZANAFLEX) 4 MG tablet, Take 4 mg by mouth every night at bedtime., Disp: , Rfl: 5  •  amoxicillin (AMOXIL) 875 MG tablet, Take 1 tablet by mouth 2 (Two) Times a Day., Disp: 20 tablet, Rfl: 0  •  benzonatate (Tessalon Perles) 100 MG capsule, Take 1 capsule by mouth 3 (Three) Times a Day As Needed for Cough., Disp: 30 capsule, Rfl: 1  •  Evolocumab (REPATHA) solution prefilled syringe injection, Inject 1 mL under the skin into the appropriate area as directed Every 14 (Fourteen) Days., Disp: 1 mL, Rfl: 5    Current Facility-Administered Medications:   •  aspirin chewable tablet 81 mg, 81 mg, Oral, Daily, Diana Alonzo MD    Allergies   Allergen Reactions   • Lisinopril Cough   • Azithromycin Diarrhea        PHYSICAL EXAMINATION:  Vitals:    01/09/23 1211   BP: 146/92   Pulse: 70   Temp: 98.2 °F (36.8 °C)      Body mass index is 29.77 kg/m².       HEENT: Normal.    CARDIAC: Normal.   LUNGS: Clear to auscultation.   EXTREMITIES: No edema.     Diagnoses and all orders for this visit:    1. Hypersomnia (Primary)  -     Home Sleep Study; Future       Follow up post Sleep     Return in about 6 months (around 7/9/2023) for Dr Seipel .    I spent 17 minutes caring for Melodie on this date of service. This time includes time spent by me in the following activities: obtaining and/or reviewing a separately obtained history, performing a medically appropriate examination and/or evaluation, counseling and educating the patient/family/caregiver and documenting information in the medical record.      This document has  been electronically signed by Helen MINA on January 9, 2023 12:56 EST

## 2023-01-09 ENCOUNTER — OFFICE VISIT (OUTPATIENT)
Dept: NEUROLOGY | Facility: CLINIC | Age: 58
End: 2023-01-09
Payer: MEDICARE

## 2023-01-09 VITALS
BODY MASS INDEX: 29.77 KG/M2 | HEART RATE: 70 BPM | DIASTOLIC BLOOD PRESSURE: 92 MMHG | HEIGHT: 63 IN | SYSTOLIC BLOOD PRESSURE: 146 MMHG | WEIGHT: 168 LBS | TEMPERATURE: 98.2 F

## 2023-01-09 DIAGNOSIS — G47.10 HYPERSOMNIA: Primary | ICD-10-CM

## 2023-01-09 PROCEDURE — 99212 OFFICE O/P EST SF 10 MIN: CPT | Performed by: NURSE PRACTITIONER

## 2023-01-09 NOTE — LETTER
January 9, 2023     Isis Franklin MD  [address] [facility] [place] [address]    Patient: Melodie Carlos   YOB: 1965   Date of Visit: 1/9/2023       Dear Dr. Rigoberto MD:    Thank you for referring Melodie Carlos to me for evaluation. Below are the relevant portions of my assessment and plan of care.    If you have questions, please do not hesitate to call me. I look forward to following Melodie along with you.         Sincerely,        RUBIN Rivers        CC: No Recipients  Helen Moody, RUBIN  01/09/23 1404  Signed  Sleep medicine follow-up visit    Melodie Carlos   1965  58 y.o. female   DATE OF SERVICE: 1/9/2023   She didn't get the hst done  Still unable to sleep   Still is taking tizanidine only sleeps one hour at a time.   She only sleeps 3-4 hours a night.       SLEEP TESTING HISTORY: The patient has not completed testing    Review of Systems   Constitutional: Positive for fatigue. Negative for fever.   HENT: Negative.    Eyes: Negative.    Respiratory: Negative for cough and shortness of breath.    Cardiovascular: Negative for chest pain.   Gastrointestinal: Negative for abdominal pain and nausea.   Endocrine: Negative.    Genitourinary: Negative.    Musculoskeletal: Positive for back pain. Negative for neck pain.   Neurological: Negative for dizziness and light-headedness.   Psychiatric/Behavioral: Positive for sleep disturbance. Negative for agitation and confusion.     I reviewed and addressed ROS entered by MA.    History of Present Illness  The patient presented today for a sleep follow-up.  She states she was unable to complete sleep testing because she was unaware of how this would be completed.  I discussed in detail, home study is completed and the hospital study is completed.  The patient states she is up and down throughout the night and was concerned about this with the study.  She states she does have a Ambien to take with a home study.  The  patient was notified that we would need a minimum of 4 to 6 hours of sleep to get a good sleep study.  Also if she gets up during the night but goes back to sleep she could plug the equipment back in.  The patient states she is agreeable with undergoing sleep testing.    She states she does have other issues affecting her sleep which consist of #1 hot flashes #2 abdominal pain #3 urination throughout the night and #4 waking up with a very dry mouth.  She last saw Dr. Seiple in March 2022 and was ordered a sleep test.    Sleep history  Pt falls asleep in about 15 min.   Pt has trouble staying sleep, wakes up about 1 or 2, then lays in bed for 2-3 hours then falls asleep for hour or two.    The patient c/o daytime sleepiness issues:  no  There is no history of hypnagogic hallucinations, sleep paralysis or cataplexy.  The patient complains of snoring has dry mouth or sore mouth when he wakes up. Snores maybe in all positions, wakes self up snoring,   The patient complains of Leg symptoms: no. No discomfort in legs when falling asleep  The patient complains of problems with insomnia:restless  Sleep schedule: Bedtime:10 , gets out of bed at varies, sleep latency: 30min, Gets about 4 hours of sleep. Used to sleep all night from 9pm to 5am since she has not  been working having trouble with sleep     The following portions of the patient's history were reviewed and updated as appropriate: allergies, current medications, past family history, past medical history, past social history, past surgical history and problem list.      Family History   Problem Relation Age of Onset   • Lung cancer Mother    • Heart disease Father    • No Known Problems Sister    • No Known Problems Brother    • No Known Problems Brother        Past Medical History:   Diagnosis Date   • Anemia    • Cerebrovascular accident (CVA) ([facility]) 3/8/2012    x2   • Hyperlipidemia    • Hypertension        Social History     Socioeconomic History   • Marital  status: Single   • Number of children: 1   Tobacco Use   • Smoking status: Former     Packs/day: 2.00     Years: 35.00     Pack years: 70.00     Types: Cigarettes     Quit date: 2013     Years since quitting: 10.0     Passive exposure: Past   • Smokeless tobacco: Never   Vaping Use   • Vaping Use: Never used   Substance and Sexual Activity   • Alcohol use: Yes     Comment: social   • Drug use: No   • Sexual activity: Not Currently         Current Outpatient Medications:   •  amLODIPine (NORVASC) 10 MG tablet, Take 1 tablet by mouth Daily., Disp: 90 tablet, Rfl: 3  •  atorvastatin (LIPITOR) 80 MG tablet, Take 1 tablet by mouth Daily., Disp: 90 tablet, Rfl: 3  •  chlorthalidone (HYGROTON) 25 MG tablet, Take 0.5 tablets by mouth Daily., Disp: 45 tablet, Rfl: 3  •  ezetimibe (Zetia) 10 MG tablet, Take 1 tablet by mouth Daily., Disp: 90 tablet, Rfl: 3  •  HYDROcodone-acetaminophen (NORCO)  MG per tablet, Take 1 tablet by mouth Every 6 (Six) Hours., Disp: , Rfl: 0  •  tiZANidine (ZANAFLEX) 4 MG tablet, Take 4 mg by mouth every night at bedtime., Disp: , Rfl: 5  •  amoxicillin (AMOXIL) 875 MG tablet, Take 1 tablet by mouth 2 (Two) Times a Day., Disp: 20 tablet, Rfl: 0  •  benzonatate (Tessalon Perles) 100 MG capsule, Take 1 capsule by mouth 3 (Three) Times a Day As Needed for Cough., Disp: 30 capsule, Rfl: 1  •  Evolocumab (REPATHA) solution prefilled syringe injection, Inject 1 mL under the skin into the appropriate area as directed Every 14 (Fourteen) Days., Disp: 1 mL, Rfl: 5    Current Facility-Administered Medications:   •  aspirin chewable tablet 81 mg, 81 mg, Oral, Daily, Diana Alonzo MD    Allergies   Allergen Reactions   • Lisinopril Cough   • Azithromycin Diarrhea        PHYSICAL EXAMINATION:  Vitals:    01/09/23 1211   BP: 146/92   Pulse: 70   Temp: 98.2 °F (36.8 °C)      Body mass index is 29.77 kg/m².       HEENT: Normal.    CARDIAC: Normal.   LUNGS: Clear to auscultation.   EXTREMITIES: No edema.      Diagnoses and all orders for this visit:    1. Hypersomnia (Primary)  -     Home Sleep Study; Future       Follow up post Sleep     Return in about 6 months (around 7/9/2023) for Dr Seipel .    I spent 17 minutes caring for Melodie on this date of service. This time includes time spent by me in the following activities: obtaining and/or reviewing a separately obtained history, performing a medically appropriate examination and/or evaluation, counseling and educating the patient/family/caregiver and documenting information in the medical record.      This document has been electronically signed by Helen MINA on January 9, 2023 12:56 EST

## 2023-01-10 ENCOUNTER — OFFICE (AMBULATORY)
Dept: URBAN - METROPOLITAN AREA CLINIC 64 | Facility: CLINIC | Age: 58
End: 2023-01-10

## 2023-01-10 VITALS
HEIGHT: 64 IN | HEART RATE: 73 BPM | DIASTOLIC BLOOD PRESSURE: 103 MMHG | WEIGHT: 169 LBS | SYSTOLIC BLOOD PRESSURE: 146 MMHG

## 2023-01-10 DIAGNOSIS — R10.13 EPIGASTRIC PAIN: ICD-10-CM

## 2023-01-10 DIAGNOSIS — R10.32 LEFT LOWER QUADRANT PAIN: ICD-10-CM

## 2023-01-10 DIAGNOSIS — K59.03 DRUG INDUCED CONSTIPATION: ICD-10-CM

## 2023-01-10 PROCEDURE — 99213 OFFICE O/P EST LOW 20 MIN: CPT | Performed by: NURSE PRACTITIONER

## 2023-01-10 RX ORDER — METHYLNALTREXONE BROMIDE 150 MG/1
450 TABLET ORAL
Qty: 90 | Refills: 11 | Status: ACTIVE
Start: 2023-01-10

## 2023-01-17 ENCOUNTER — PATIENT MESSAGE (OUTPATIENT)
Dept: FAMILY MEDICINE CLINIC | Facility: CLINIC | Age: 58
End: 2023-01-17
Payer: MEDICARE

## 2023-01-17 NOTE — LETTER
01/23/23          Melodie Carlos  [address] [facility] [place] [address]           Dear Melodie Carlos,      Your provider ordered fasting labs at your last Office Visit. These labs have not been completed to our knowledge. Please call our office for a lab appt. or we can send orders to [facility] to have draw at the [place] Location behind [facility] no appt needed, [facility] in [place] across from [facility] on [address] call for appt at [phone] or asking you to go to [facility]. No appointments needed.      Please let us know where you would like these orders completed.    [facility] [place]

## 2023-01-17 NOTE — LETTER
January 23, 2023     Melodie Carlos  [address] [facility] [place] [address]    Patient: Melodie Carlos   YOB: 1965   Date of Visit: 1/17/2023       Dear Dr. Carlos:     Your provider ordered fasting labs at your last Office Visit. These labs have not been completed to our knowledge. Please call our office for a lab appt. or we can send orders to [facility] to have draw at the [place] Location behind [facility] no appt needed, [facility] in [place] across from [facility] on [address] call for appt at [phone] or asking you to go to [facility]. No appointments needed.      Please let us know where you would like these orders completed.    [facility] [place]

## 2023-01-17 NOTE — LETTER
January 23, 2023      Melodiedarwin DWYER Chastain    [address] [facility] [place] [address]        Dear Ms. Carlos    Below are the results from your recent visit:    Office Visit on 12/30/2022   Component Date Value Ref Range Status   • Color 12/30/2022 Yellow  Yellow, Straw, Dark Yellow, Reshma Final   • Clarity, UA 12/30/2022 Clear  Clear Final   • Specific Gravity  12/30/2022 1.030  1.005 - 1.030 Final   • pH, Urine 12/30/2022 7.0  5.0 - 8.0 Final   • Leukocytes 12/30/2022 Negative  Negative Final   • Nitrite, UA 12/30/2022 Negative  Negative Final   • Protein, POC 12/30/2022 Negative  Negative mg/dL Final   • Glucose, UA 12/30/2022 Negative  Negative mg/dL Final   • Ketones, UA 12/30/2022 Negative  Negative Final   • Urobilinogen, UA 12/30/2022 Normal  Normal, 0.2 E.U./dL Final   • Bilirubin 12/30/2022 Negative  Negative Final   • Blood, UA 12/30/2022 Negative  Negative Final   • Lot Number 12/30/2022 [id number]   Final   • Expiration Date 12/30/2022 11/30/2023   Final                 Sincerely,      Generic Provider Vi

## 2023-01-30 ENCOUNTER — HOSPITAL ENCOUNTER (OUTPATIENT)
Dept: SLEEP MEDICINE | Facility: HOSPITAL | Age: 58
Discharge: HOME | End: 2023-01-30
Admitting: NURSE PRACTITIONER
Payer: MEDICARE

## 2023-01-30 DIAGNOSIS — G47.10 HYPERSOMNIA: ICD-10-CM

## 2023-01-30 PROCEDURE — 95806 SLEEP STUDY UNATT&RESP EFFT: CPT

## 2023-01-30 PROCEDURE — 95806 SLEEP STUDY UNATT&RESP EFFT: CPT | Performed by: PSYCHIATRY & NEUROLOGY

## 2023-02-16 ENCOUNTER — OFFICE VISIT (OUTPATIENT)
Dept: FAMILY MEDICINE CLINIC | Facility: CLINIC | Age: 58
End: 2023-02-16
Payer: MEDICARE

## 2023-02-16 VITALS
TEMPERATURE: 98.2 F | SYSTOLIC BLOOD PRESSURE: 143 MMHG | HEART RATE: 69 BPM | DIASTOLIC BLOOD PRESSURE: 92 MMHG | OXYGEN SATURATION: 96 % | HEIGHT: 63 IN | BODY MASS INDEX: 30.33 KG/M2 | WEIGHT: 171.2 LBS

## 2023-02-16 DIAGNOSIS — I10 PRIMARY HYPERTENSION: ICD-10-CM

## 2023-02-16 DIAGNOSIS — R31.9 HEMATURIA, UNSPECIFIED TYPE: ICD-10-CM

## 2023-02-16 DIAGNOSIS — Z12.4 SCREENING FOR CERVICAL CANCER: Primary | ICD-10-CM

## 2023-02-16 DIAGNOSIS — E78.5 HYPERLIPIDEMIA, UNSPECIFIED HYPERLIPIDEMIA TYPE: ICD-10-CM

## 2023-02-16 DIAGNOSIS — G89.29 CHRONIC LEFT-SIDED LOW BACK PAIN WITHOUT SCIATICA: ICD-10-CM

## 2023-02-16 DIAGNOSIS — E55.9 VITAMIN D DEFICIENCY: ICD-10-CM

## 2023-02-16 DIAGNOSIS — Z12.31 ENCOUNTER FOR SCREENING MAMMOGRAM FOR MALIGNANT NEOPLASM OF BREAST: ICD-10-CM

## 2023-02-16 DIAGNOSIS — M54.50 CHRONIC LEFT-SIDED LOW BACK PAIN WITHOUT SCIATICA: ICD-10-CM

## 2023-02-16 DIAGNOSIS — E07.9 DISORDER OF THYROID: ICD-10-CM

## 2023-02-16 DIAGNOSIS — E66.09 CLASS 1 OBESITY DUE TO EXCESS CALORIES WITH SERIOUS COMORBIDITY AND BODY MASS INDEX (BMI) OF 30.0 TO 30.9 IN ADULT: ICD-10-CM

## 2023-02-16 DIAGNOSIS — K52.832 LYMPHOCYTIC COLITIS: ICD-10-CM

## 2023-02-16 DIAGNOSIS — Z87.09 H/O PULMONARY EMPHYSEMA: ICD-10-CM

## 2023-02-16 PROCEDURE — 99214 OFFICE O/P EST MOD 30 MIN: CPT | Performed by: PREVENTIVE MEDICINE

## 2023-02-16 NOTE — PATIENT INSTRUCTIONS
Health Maintenance Due   Topic Date Due    ZOSTER VACCINE (1 of 2) Never done    PAP SMEAR  07/26/2019    COVID-19 Vaccine (3 - Booster for Moderna series) 06/17/2021    INFLUENZA VACCINE  Never done    12 hour fast for labs  Patient to get name of gastro medicine to us  Patient to call sleep doctor about mask and usint-has been 4 weeks

## 2023-02-16 NOTE — PROGRESS NOTES
"Andre Carlos is a 58 y.o. female presents for   Chief Complaint   Patient presents with   • Follow-up     6 month   Patient presents today for 6-month follow-up and is being seen for multiple chronic health conditions most of which are stable.  She is having some discomfort in her left-sided low back and she feels as though that is why her blood pressure is elevated today and we will order an MRI of the back and that she has been in physical therapy to no avail for the last 4 weeks.  No trouble controlling her bowel or bladder and no pain into her legs.    Health Maintenance Due   Topic Date Due   • ZOSTER VACCINE (1 of 2) Never done   • PAP SMEAR  07/26/2019   • COVID-19 Vaccine (3 - Booster for Moderna series) 06/17/2021   • INFLUENZA VACCINE  Never done       History of Present Illness     Vitals:    02/16/23 1319 02/16/23 1326   BP: 136/89 143/92   BP Location: Left arm Right arm   Patient Position: Sitting Sitting   Cuff Size: Adult Adult   Pulse: 69    Temp: 98.2 °F (36.8 °C)    TempSrc: Tympanic    SpO2: 96%    Weight: 77.7 kg (171 lb 3.2 oz)    Height: 160 cm (62.99\")      Body mass index is 30.33 kg/m².    Current Outpatient Medications on File Prior to Visit   Medication Sig Dispense Refill   • amLODIPine (NORVASC) 10 MG tablet Take 1 tablet by mouth Daily. 90 tablet 3   • atorvastatin (LIPITOR) 80 MG tablet Take 1 tablet by mouth Daily. 90 tablet 3   • chlorthalidone (HYGROTON) 25 MG tablet Take 0.5 tablets by mouth Daily. 45 tablet 3   • ezetimibe (Zetia) 10 MG tablet Take 1 tablet by mouth Daily. 90 tablet 3   • HYDROcodone-acetaminophen (NORCO)  MG per tablet Take 1 tablet by mouth Every 6 (Six) Hours.  0   • tiZANidine (ZANAFLEX) 4 MG tablet Take 4 mg by mouth every night at bedtime.  5   • benzonatate (Tessalon Perles) 100 MG capsule Take 1 capsule by mouth 3 (Three) Times a Day As Needed for Cough. 30 capsule 1   • Evolocumab (REPATHA) solution prefilled syringe injection " Inject 1 mL under the skin into the appropriate area as directed Every 14 (Fourteen) Days. 1 mL 5   • [DISCONTINUED] amoxicillin (AMOXIL) 875 MG tablet Take 1 tablet by mouth 2 (Two) Times a Day. 20 tablet 0     Current Facility-Administered Medications on File Prior to Visit   Medication Dose Route Frequency Provider Last Rate Last Admin   • aspirin chewable tablet 81 mg  81 mg Oral Daily Diana Alonzo MD           The following portions of the patient's history were reviewed and updated as appropriate: allergies, current medications, past family history, past medical history, past social history, past surgical history and problem list.    Review of Systems   Respiratory: Positive for shortness of breath.    Gastrointestinal: Positive for abdominal pain.   Musculoskeletal: Positive for back pain, gait problem and myalgias.       Objective   Physical Exam  Vitals reviewed.   Constitutional:       General: She is not in acute distress.     Appearance: She is well-developed. She is obese. She is not ill-appearing or toxic-appearing.   HENT:      Head: Normocephalic and atraumatic.      Right Ear: Tympanic membrane, ear canal and external ear normal.      Left Ear: Tympanic membrane, ear canal and external ear normal.      Nose: Nose normal.      Mouth/Throat:      Mouth: Mucous membranes are moist.      Pharynx: No posterior oropharyngeal erythema.   Eyes:      Extraocular Movements: Extraocular movements intact.      Conjunctiva/sclera: Conjunctivae normal.      Pupils: Pupils are equal, round, and reactive to light.   Cardiovascular:      Rate and Rhythm: Normal rate and regular rhythm.      Heart sounds: Normal heart sounds.   Pulmonary:      Effort: Pulmonary effort is normal.      Comments: Decreased breath sounds bilaterally  Abdominal:      General: Bowel sounds are normal. There is no distension.      Palpations: Abdomen is soft. There is no mass.      Tenderness: There is no abdominal tenderness.    Musculoskeletal:         General: Tenderness present.      Cervical back: Neck supple.   Skin:     General: Skin is warm.   Neurological:      General: No focal deficit present.      Mental Status: She is alert and oriented to person, place, and time. Mental status is at baseline.      Gait: Gait abnormal.   Psychiatric:         Mood and Affect: Mood normal.         Behavior: Behavior normal.       PHQ-9 Total Score:      Assessment & Plan   Diagnoses and all orders for this visit:    1. Screening for cervical cancer (Primary)  Comments:  Patient will get in for Pap smear.  Orders:  -     Ambulatory Referral to Gynecology    2. Hyperlipidemia, unspecified hyperlipidemia type  Comments:  Patient is trying to watch her saturated fats  Orders:  -     Lipid Panel; Future    3. Primary hypertension  Comments:  elevated today due to pain  Orders:  -     CBC Auto Differential; Future  -     Comprehensive Metabolic Panel; Future    4. Disorder of thyroid  Comments:  No increase in dry skin or hairloss  Orders:  -     TSH; Future    5. Hematuria, unspecified type  Comments:  Not noted any blood in her urine  Orders:  -     Urinalysis With Culture If Indicated - Urine, Clean Catch; Future    6. Lymphocytic colitis  Comments:  Will be following up soon with [facility].  Orders:  -     Magnesium; Future    7. H/O pulmonary emphysema  Comments:  No smoking for 9-10 years    8. Vitamin D deficiency  Comments:  Doesn't take  Orders:  -     Vitamin D,25-Hydroxy; Future    9. Chronic left-sided low back pain without sciatica  Comments:  MRI due to failure at PT for pain relief  Orders:  -     MRI Lumbar Spine Without Contrast; Future    10. Encounter for screening mammogram for malignant neoplasm of breast  Comments:  Will be getting mammogram in March.  Orders:  -     Mammo Screening Digital Tomosynthesis Bilateral With CAD; Future    11. Class 1 obesity due to excess calories with serious comorbidity and body mass index (BMI) of 30.0  to 30.9 in adult        Patient Instructions     Health Maintenance Due   Topic Date Due   • ZOSTER VACCINE (1 of 2) Never done   • PAP SMEAR  07/26/2019   • COVID-19 Vaccine (3 - Booster for Moderna series) 06/17/2021   • INFLUENZA VACCINE  Never done    12 hour fast for labs  Patient to get name of gastro medicine to us  Patient to call sleep doctor about mask and usint-has been 4 weeks

## 2023-02-22 ENCOUNTER — CLINICAL SUPPORT (OUTPATIENT)
Dept: FAMILY MEDICINE CLINIC | Facility: CLINIC | Age: 58
End: 2023-02-22
Payer: MEDICARE

## 2023-02-22 ENCOUNTER — TELEPHONE (OUTPATIENT)
Dept: NEUROLOGY | Facility: CLINIC | Age: 58
End: 2023-02-22
Payer: MEDICARE

## 2023-02-22 DIAGNOSIS — G47.33 OBSTRUCTIVE SLEEP APNEA: Primary | ICD-10-CM

## 2023-02-22 DIAGNOSIS — E07.9 DISORDER OF THYROID: ICD-10-CM

## 2023-02-22 DIAGNOSIS — E78.5 HYPERLIPIDEMIA, UNSPECIFIED HYPERLIPIDEMIA TYPE: ICD-10-CM

## 2023-02-22 DIAGNOSIS — I10 PRIMARY HYPERTENSION: ICD-10-CM

## 2023-02-22 DIAGNOSIS — D64.9 ANEMIA, UNSPECIFIED TYPE: ICD-10-CM

## 2023-02-22 DIAGNOSIS — R31.9 HEMATURIA, UNSPECIFIED TYPE: ICD-10-CM

## 2023-02-22 DIAGNOSIS — K52.832 LYMPHOCYTIC COLITIS: ICD-10-CM

## 2023-02-22 DIAGNOSIS — E55.9 VITAMIN D DEFICIENCY: ICD-10-CM

## 2023-02-22 LAB
25(OH)D3 SERPL-MCNC: 39.5 NG/ML (ref 30–100)
ALBUMIN SERPL-MCNC: 4.3 G/DL (ref 3.5–5.2)
ALBUMIN/GLOB SERPL: 1.3 G/DL
ALP SERPL-CCNC: 121 U/L (ref 39–117)
ALT SERPL W P-5'-P-CCNC: 26 U/L (ref 1–33)
ANION GAP SERPL CALCULATED.3IONS-SCNC: 10 MMOL/L (ref 5–15)
AST SERPL-CCNC: 21 U/L (ref 1–32)
BACTERIA UR QL AUTO: NORMAL /HPF
BASOPHILS # BLD AUTO: 0.06 10*3/MM3 (ref 0–0.2)
BASOPHILS NFR BLD AUTO: 0.6 % (ref 0–1.5)
BILIRUB SERPL-MCNC: 0.4 MG/DL (ref 0–1.2)
BILIRUB UR QL STRIP: NEGATIVE
BUN SERPL-MCNC: 16 MG/DL (ref 8–23)
BUN/CREAT SERPL: 21.1 (ref 7–25)
CALCIUM SPEC-SCNC: 9.8 MG/DL (ref 8.6–10.5)
CHLORIDE SERPL-SCNC: 96 MMOL/L (ref 98–107)
CHOLEST SERPL-MCNC: 162 MG/DL (ref 0–200)
CLARITY UR: CLEAR
CO2 SERPL-SCNC: 32 MMOL/L (ref 22–29)
COLOR UR: YELLOW
CREAT SERPL-MCNC: 0.76 MG/DL (ref 0.57–1)
DEPRECATED RDW RBC AUTO: 41.2 FL (ref 37–54)
EGFRCR SERPLBLD CKD-EPI 2021: 89.8 ML/MIN/1.73
EOSINOPHIL # BLD AUTO: 0.56 10*3/MM3 (ref 0–0.4)
EOSINOPHIL NFR BLD AUTO: 5.3 % (ref 0.3–6.2)
ERYTHROCYTE [DISTWIDTH] IN BLOOD BY AUTOMATED COUNT: 12.9 % (ref 12.3–15.4)
GLOBULIN UR ELPH-MCNC: 3.3 GM/DL
GLUCOSE SERPL-MCNC: 89 MG/DL (ref 65–99)
GLUCOSE UR STRIP-MCNC: NEGATIVE MG/DL
HCT VFR BLD AUTO: 38.9 % (ref 34–46.6)
HDLC SERPL-MCNC: 65 MG/DL (ref 40–60)
HGB BLD-MCNC: 13 G/DL (ref 12–15.9)
HGB UR QL STRIP.AUTO: NEGATIVE
HYALINE CASTS UR QL AUTO: NORMAL /LPF
IMM GRANULOCYTES # BLD AUTO: 0.04 10*3/MM3 (ref 0–0.05)
IMM GRANULOCYTES NFR BLD AUTO: 0.4 % (ref 0–0.5)
KETONES UR QL STRIP: NEGATIVE
LDLC SERPL CALC-MCNC: 79 MG/DL (ref 0–100)
LDLC/HDLC SERPL: 1.18 {RATIO}
LEUKOCYTE ESTERASE UR QL STRIP.AUTO: ABNORMAL
LYMPHOCYTES # BLD AUTO: 2.03 10*3/MM3 (ref 0.7–3.1)
LYMPHOCYTES NFR BLD AUTO: 19.3 % (ref 19.6–45.3)
MAGNESIUM SERPL-MCNC: 2.2 MG/DL (ref 1.6–2.4)
MCH RBC QN AUTO: 29.1 PG (ref 26.6–33)
MCHC RBC AUTO-ENTMCNC: 33.4 G/DL (ref 31.5–35.7)
MCV RBC AUTO: 87.2 FL (ref 79–97)
MONOCYTES # BLD AUTO: 0.76 10*3/MM3 (ref 0.1–0.9)
MONOCYTES NFR BLD AUTO: 7.2 % (ref 5–12)
NEUTROPHILS NFR BLD AUTO: 67.2 % (ref 42.7–76)
NEUTROPHILS NFR BLD AUTO: 7.08 10*3/MM3 (ref 1.7–7)
NITRITE UR QL STRIP: NEGATIVE
NRBC BLD AUTO-RTO: 0 /100 WBC (ref 0–0.2)
PH UR STRIP.AUTO: 6.5 [PH] (ref 5–8)
PLATELET # BLD AUTO: 285 10*3/MM3 (ref 140–450)
PMV BLD AUTO: 12.8 FL (ref 6–12)
POTASSIUM SERPL-SCNC: 3.9 MMOL/L (ref 3.5–5.2)
PROT SERPL-MCNC: 7.6 G/DL (ref 6–8.5)
PROT UR QL STRIP: NEGATIVE
RBC # BLD AUTO: 4.46 10*6/MM3 (ref 3.77–5.28)
RBC # UR STRIP: NORMAL /HPF
REF LAB TEST METHOD: NORMAL
SODIUM SERPL-SCNC: 138 MMOL/L (ref 136–145)
SP GR UR STRIP: 1.02 (ref 1–1.03)
SQUAMOUS #/AREA URNS HPF: NORMAL /HPF
TRIGL SERPL-MCNC: 100 MG/DL (ref 0–150)
TSH SERPL DL<=0.05 MIU/L-ACNC: 2.78 UIU/ML (ref 0.27–4.2)
UROBILINOGEN UR QL STRIP: ABNORMAL
VIT B12 BLD-MCNC: 543 PG/ML (ref 211–946)
VLDLC SERPL-MCNC: 18 MG/DL (ref 5–40)
WBC # UR STRIP: NORMAL /HPF
WBC NRBC COR # BLD: 10.53 10*3/MM3 (ref 3.4–10.8)

## 2023-02-22 PROCEDURE — 36415 COLL VENOUS BLD VENIPUNCTURE: CPT | Performed by: PREVENTIVE MEDICINE

## 2023-02-22 PROCEDURE — 80061 LIPID PANEL: CPT | Performed by: PREVENTIVE MEDICINE

## 2023-02-22 PROCEDURE — 80053 COMPREHEN METABOLIC PANEL: CPT | Performed by: PREVENTIVE MEDICINE

## 2023-02-22 PROCEDURE — 82607 VITAMIN B-12: CPT | Performed by: PREVENTIVE MEDICINE

## 2023-02-22 PROCEDURE — 82306 VITAMIN D 25 HYDROXY: CPT | Performed by: PREVENTIVE MEDICINE

## 2023-02-22 PROCEDURE — 85025 COMPLETE CBC W/AUTO DIFF WBC: CPT | Performed by: PREVENTIVE MEDICINE

## 2023-02-22 PROCEDURE — 81001 URINALYSIS AUTO W/SCOPE: CPT | Performed by: PREVENTIVE MEDICINE

## 2023-02-22 PROCEDURE — 84443 ASSAY THYROID STIM HORMONE: CPT | Performed by: PREVENTIVE MEDICINE

## 2023-02-22 PROCEDURE — 83735 ASSAY OF MAGNESIUM: CPT | Performed by: PREVENTIVE MEDICINE

## 2023-02-22 NOTE — PROGRESS NOTES
Venipuncture performed on Left Arm by Jayne Joshi MA  with good hemostasis. Patient tolerated well. 02/22/23   Isis Franklin MD

## 2023-02-23 ENCOUNTER — TELEPHONE (OUTPATIENT)
Dept: FAMILY MEDICINE CLINIC | Facility: CLINIC | Age: 58
End: 2023-02-23
Payer: MEDICARE

## 2023-02-23 NOTE — PROGRESS NOTES
Bad cholesterol is still 79 and goal is below 70 you are on maximum atorvastatin Zetia and Repatha have you missed any of your doses?  If not try to decrease your saturated fats and increase your walking.  Urine showed a little trace of leukocyte Estrace but not enough to trigger a culture.  If you are still having urinary tract symptoms we can do a careful clean-catch recollect next week call if any other questions or concerns

## 2023-02-23 NOTE — TELEPHONE ENCOUNTER
HUB TO READ:  ----- Message from Isis Franklin MD sent at 2/23/2023  7:26 AM EST -----  Bad cholesterol is still 79 and goal is below 70 you are on maximum atorvastatin Zetia and Repatha have you missed any of your doses?  If not try to decrease your saturated fats and increase your walking.  Urine showed a little trace of leukocyte Estrace but not enough to trigger a culture.  If you are still having urinary tract symptoms we can do a careful clean-catch recollect next week call if any other questions or concerns

## 2023-02-27 NOTE — PROGRESS NOTES
MRI of the LS spine from [facility] does show multilevel degenerative disc disease and some mild spinal canal stenosis seen and also some areas where nerves could be pinched can follow-up with the neurosurgeon if she is still having discomfort let me know also there appears to be some sort of a mass in the region of her left adrenal gland and we are having that compared with prior abdominal CT from [place] from 2019 and I suspect that this adrenal mass has not changed.  But we will let her know as soon as those results are available call and let me know about the neurosurgeon.

## 2023-02-28 ENCOUNTER — TELEPHONE (OUTPATIENT)
Dept: FAMILY MEDICINE CLINIC | Facility: CLINIC | Age: 58
End: 2023-02-28
Payer: MEDICARE

## 2023-02-28 DIAGNOSIS — M54.50 LOW BACK PAIN, UNSPECIFIED BACK PAIN LATERALITY, UNSPECIFIED CHRONICITY, UNSPECIFIED WHETHER SCIATICA PRESENT: Primary | ICD-10-CM

## 2023-02-28 NOTE — TELEPHONE ENCOUNTER
HUB TO READ:  ----- Message from Isis Franklin MD sent at 2/27/2023  5:17 PM EST -----  MRI of the LS spine from [facility] does show multilevel degenerative disc disease and some mild spinal canal stenosis seen and also some areas where nerves could be pinched can follow-up with the neurosurgeon if she is still having discomfort let me know also there appears to be some sort of a mass in the region of her left adrenal gland and we are having that compared with prior abdominal CT from [place] from 2019 and I suspect that this adrenal mass has not changed.  But we will let her know as soon as those results are available call and let me know about the neurosurgeon.

## 2023-02-28 NOTE — TELEPHONE ENCOUNTER
YES PATIENT NEEDS TO SEE SOMEONE. SHE IS NOT ABLE TO LAY TO EVEN SLEEP. SHE HAS STOPPED PT IT WAS CAUSING TO MUCH PAIN

## 2023-03-08 NOTE — PROGRESS NOTES
Neurosurgical Consultation      Melodie Carlos is a 58 y.o. female is being seen for consultation today at the request of Isis Franklin MD for low back pain. In the office today patient reports low back pain traveling into left hip.    Chief Complaint   Patient presents with   • Back Pain     New evaluation        Previous treatment: Hydrocodone, Tizanadine, Physical Therapy/4 visits    HPI: This is a 58-year-old woman with a BMI of 31 as well as prior strokes and current hypertension and obstructive sleep apnea.  She notes that she awoke approximately 3 months ago with pain in her left lateral hip.  The pain does occasionally extend down to her left lateral ankle.  The pain has been stable for 3 months.  The pain is worsened with sitting or walking upstairs.  She describes the pain as a sharp ache.  She has attempted ice and heat as well as over-the-counter ibuprofen without significant improvement.  She is on chronic hydrocodone for neck pain.  She does note remote lumbar spinal injections with 3 to 4 months of relief from the symptoms at that juncture.  She was a chronic longstanding smoker at 3 and half pack per day for 35 years.  She did complete approximately 4 visits with physical therapy and elected to discontinue this pending her lumbar MRI.    Past Medical History:   Diagnosis Date   • Anemia    • Cerebrovascular accident (CVA) ([facility]) 3/8/2012    x2   • Hyperlipidemia    • Hypertension         Past Surgical History:   Procedure Laterality Date   • COLONOSCOPY     • ELBOW ARTHROSCOPY Right 2004    2x   • HYSTERECTOMY     • WRIST FRACTURE SURGERY          Current Outpatient Medications on File Prior to Visit   Medication Sig Dispense Refill   • amLODIPine (NORVASC) 10 MG tablet Take 1 tablet by mouth Daily. 90 tablet 3   • atorvastatin (LIPITOR) 80 MG tablet Take 1 tablet by mouth Daily. 90 tablet 3   • chlorthalidone (HYGROTON) 25 MG tablet Take 1 tablet by mouth Daily.     • ezetimibe (Zetia) 10  MG tablet Take 1 tablet by mouth Daily. 90 tablet 3   • HYDROcodone-acetaminophen (NORCO)  MG per tablet Take 1 tablet by mouth Every 6 (Six) Hours.  0   • tiZANidine (ZANAFLEX) 4 MG tablet Take 1 tablet by mouth every night at bedtime.  5   • [DISCONTINUED] benzonatate (Tessalon Perles) 100 MG capsule Take 1 capsule by mouth 3 (Three) Times a Day As Needed for Cough. 30 capsule 1   • [DISCONTINUED] chlorthalidone (HYGROTON) 25 MG tablet Take 0.5 tablets by mouth Daily. 45 tablet 3   • [DISCONTINUED] Evolocumab (REPATHA) solution prefilled syringe injection Inject 1 mL under the skin into the appropriate area as directed Every 14 (Fourteen) Days. 1 mL 5     Current Facility-Administered Medications on File Prior to Visit   Medication Dose Route Frequency Provider Last Rate Last Admin   • aspirin chewable tablet 81 mg  81 mg Oral Daily Diana Alonzo MD            Allergies   Allergen Reactions   • Lisinopril Cough   • Azithromycin Diarrhea        Social History     Socioeconomic History   • Marital status: Single   • Number of children: 1   Tobacco Use   • Smoking status: Former     Packs/day: 2.00     Years: 35.00     Pack years: 70.00     Types: Cigarettes     Quit date: 2013     Years since quitting: 10.1     Passive exposure: Past   • Smokeless tobacco: Never   Vaping Use   • Vaping Use: Never used   Substance and Sexual Activity   • Alcohol use: Yes     Comment: social   • Drug use: No   • Sexual activity: Not Currently          Review of Systems   Constitutional: Positive for activity change.   HENT: Negative.    Eyes: Negative.    Respiratory: Negative.    Cardiovascular: Negative.    Gastrointestinal: Negative.    Endocrine: Negative.    Genitourinary: Negative.    Musculoskeletal: Positive for arthralgias, back pain and myalgias.   Skin: Negative.    Allergic/Immunologic: Negative.    Neurological: Positive for weakness (left leg). Negative for numbness.   Hematological: Negative.   "  Psychiatric/Behavioral: Positive for sleep disturbance.        Physical Examination:     Vitals:    03/09/23 1358   BP: 152/91   Pulse: 74   SpO2: 95%   Weight: 78.8 kg (173 lb 12.8 oz)   Height: 160 cm (62.99\")   PainSc:   6        Physical Exam  Eyes:      General: Lids are normal.      Extraocular Movements: Extraocular movements intact.      Pupils: Pupils are equal, round, and reactive to light.   Neurological:      Deep Tendon Reflexes:      Reflex Scores:       Patellar reflexes are 2+ on the right side and 2+ on the left side.       Achilles reflexes are 2+ on the right side and 2+ on the left side.  Psychiatric:         Speech: Speech normal.          Neurological Exam  Mental Status  Awake, alert and oriented to person, place and time. Speech is normal. Language is fluent with no aphasia.    Cranial Nerves  CN II: Visual fields full to confrontation.  CN III, IV, VI: Extraocular movements intact bilaterally. Normal lids and orbits bilaterally. Pupils equal round and reactive to light bilaterally.  CN V: Facial sensation is normal.  CN VII: Full and symmetric facial movement.  CN IX, X: Palate elevates symmetrically. Normal gag reflex.  CN XI: Shoulder shrug strength is normal.  CN XII: Tongue midline without atrophy or fasciculations.    Motor  Normal muscle bulk throughout. No fasciculations present. Strength is 5/5 in all four extremities except as noted.  Left hip flexion: 4+ out of 5  Left knee extension: 5 out of 5  Left dorsiflexion: 4- out of 5  Left plantar flexion: 4 out of 5.    Sensory  Light touch is normal in upper and lower extremities.     Reflexes                                            Right                      Left  Patellar                                2+                         2+  Achilles                                2+                         2+    Right pathological reflexes: Bebeto's absent.  Left pathological reflexes: Bebeto's absent.     Positive straight leg raise " on the left.  Equivocal left SI joint evocative maneuvers.    Result Review  The following data was reviewed by: Rosas Mcgee MD on 03/09/2023:    Data reviewed: Radiologic studies MRI of the lumbar spine without contrast from February 20, 2023 does not show any significant central canal stenosis.  There is multilevel mild disc bulging with resultant multilevel mild lateral recess and mild neuroforaminal stenosis.     Assessment/plan:  This 58-year-old woman with approximately 3 months of left hip pain that occasionally runs down her leg in a unclear dermatomal distribution.  Her MRI does not provide explanation for her pain.  She also has objective left ankle weakness as well as some left hip weakness.  Her MRI does not explain the distribution of her neurologic weakness.  I think it is reasonable for her to undergo exploration for intrinsic left hip pathology.  It is possible that some of her symptoms are associated with left SI joint pathology.  I do think with the objective weakness it is advisable for her to be evaluated by a neurologist and undergo an EMG/nerve conduction study of that left leg.  She will return to see me in approximately 3 months after completion of the above work-up.  I have encouraged her to call with any questions or concerns.  I do recommend reengaging with physical therapy with hopes of improving her pain as well as her weakness.    Diagnoses and all orders for this visit:    1. Chronic left SI joint pain (Primary)  Overview:  X6 months      2. Mononeuropathy multiplex  -     EMG Left Leg; Future  -     Nerve Conduction Test Left Leg; Future       Return in about 3 months (around 6/9/2023).            Rosas Mcgee MD

## 2023-03-09 ENCOUNTER — OFFICE VISIT (OUTPATIENT)
Dept: NEUROSURGERY | Facility: CLINIC | Age: 58
End: 2023-03-09
Payer: MEDICARE

## 2023-03-09 VITALS
OXYGEN SATURATION: 95 % | HEIGHT: 63 IN | HEART RATE: 74 BPM | BODY MASS INDEX: 30.79 KG/M2 | SYSTOLIC BLOOD PRESSURE: 152 MMHG | WEIGHT: 173.8 LBS | DIASTOLIC BLOOD PRESSURE: 91 MMHG

## 2023-03-09 DIAGNOSIS — G58.7 MONONEUROPATHY MULTIPLEX: ICD-10-CM

## 2023-03-09 DIAGNOSIS — M53.3 CHRONIC LEFT SI JOINT PAIN: Primary | ICD-10-CM

## 2023-03-09 DIAGNOSIS — G89.29 CHRONIC LEFT SI JOINT PAIN: Primary | ICD-10-CM

## 2023-03-09 PROCEDURE — 99204 OFFICE O/P NEW MOD 45 MIN: CPT | Performed by: NEUROLOGICAL SURGERY

## 2023-03-09 PROCEDURE — 3077F SYST BP >= 140 MM HG: CPT | Performed by: NEUROLOGICAL SURGERY

## 2023-03-09 PROCEDURE — 3080F DIAST BP >= 90 MM HG: CPT | Performed by: NEUROLOGICAL SURGERY

## 2023-03-09 PROCEDURE — 1159F MED LIST DOCD IN RCRD: CPT | Performed by: NEUROLOGICAL SURGERY

## 2023-03-09 PROCEDURE — 1160F RVW MEDS BY RX/DR IN RCRD: CPT | Performed by: NEUROLOGICAL SURGERY

## 2023-03-09 RX ORDER — CHLORTHALIDONE 25 MG/1
25 TABLET ORAL DAILY
COMMUNITY

## 2023-04-04 NOTE — TELEPHONE ENCOUNTER
Advise patient brings in meds to next visit so we can chart and make sure that GYN did not want her to stop 37.5 when she started 75 mg of Venlaxafine   0.75

## 2023-04-11 ENCOUNTER — TELEPHONE (OUTPATIENT)
Dept: NEUROSURGERY | Facility: CLINIC | Age: 58
End: 2023-04-11
Payer: MEDICARE

## 2023-04-11 NOTE — TELEPHONE ENCOUNTER
Caller: Melodie Carlos    Relationship to patient: Self    Best call back number:[phone]    Patient is needing:     PATIENT STATES SHE NEVER REC'D HER IMAGING DISC.    PLEASE CALL TO ADVISE

## 2023-04-17 ENCOUNTER — PATIENT ROUNDING (BHMG ONLY) (OUTPATIENT)
Dept: FAMILY MEDICINE CLINIC | Facility: CLINIC | Age: 58
End: 2023-04-17
Payer: MEDICARE

## 2023-04-17 ENCOUNTER — OFFICE VISIT (OUTPATIENT)
Dept: FAMILY MEDICINE CLINIC | Facility: CLINIC | Age: 58
End: 2023-04-17
Payer: MEDICARE

## 2023-04-17 VITALS
HEART RATE: 84 BPM | DIASTOLIC BLOOD PRESSURE: 92 MMHG | BODY MASS INDEX: 31.4 KG/M2 | OXYGEN SATURATION: 98 % | TEMPERATURE: 97.3 F | HEIGHT: 63 IN | SYSTOLIC BLOOD PRESSURE: 150 MMHG | WEIGHT: 177.2 LBS

## 2023-04-17 DIAGNOSIS — Z87.891 FORMER SMOKER: ICD-10-CM

## 2023-04-17 DIAGNOSIS — M79.672 CHRONIC HEEL PAIN, LEFT: ICD-10-CM

## 2023-04-17 DIAGNOSIS — Z13.220 LIPID SCREENING: ICD-10-CM

## 2023-04-17 DIAGNOSIS — Z78.0 POST-MENOPAUSAL: Primary | ICD-10-CM

## 2023-04-17 DIAGNOSIS — I10 PRIMARY HYPERTENSION: ICD-10-CM

## 2023-04-17 DIAGNOSIS — Z83.3 FAMILY HISTORY OF DIABETES MELLITUS: ICD-10-CM

## 2023-04-17 DIAGNOSIS — Z00.00 PREVENTATIVE HEALTH CARE: ICD-10-CM

## 2023-04-17 DIAGNOSIS — G89.29 CHRONIC HEEL PAIN, LEFT: ICD-10-CM

## 2023-04-17 DIAGNOSIS — G47.33 OBSTRUCTIVE SLEEP APNEA SYNDROME: ICD-10-CM

## 2023-04-17 DIAGNOSIS — R68.2 DRY MOUTH: ICD-10-CM

## 2023-04-17 DIAGNOSIS — R79.89 ABNORMAL TSH: ICD-10-CM

## 2023-04-17 DIAGNOSIS — E66.3 OVERWEIGHT: ICD-10-CM

## 2023-04-17 DIAGNOSIS — E66.09 CLASS 1 OBESITY DUE TO EXCESS CALORIES WITH SERIOUS COMORBIDITY AND BODY MASS INDEX (BMI) OF 31.0 TO 31.9 IN ADULT: ICD-10-CM

## 2023-04-17 PROBLEM — Z86.0100 HISTORY OF COLONIC POLYPS: Status: ACTIVE | Noted: 2022-04-13

## 2023-04-17 PROBLEM — K63.5 COLON POLYPS: Status: ACTIVE | Noted: 2023-04-17

## 2023-04-17 PROBLEM — E66.811 CLASS 1 OBESITY DUE TO EXCESS CALORIES WITH SERIOUS COMORBIDITY AND BODY MASS INDEX (BMI) OF 31.0 TO 31.9 IN ADULT: Status: ACTIVE | Noted: 2023-04-17

## 2023-04-17 PROBLEM — K62.5 RECTAL BLEEDING: Status: ACTIVE | Noted: 2022-04-13

## 2023-04-17 PROBLEM — R10.13 EPIGASTRIC PAIN: Status: ACTIVE | Noted: 2023-04-17

## 2023-04-17 PROBLEM — E78.00 PURE HYPERCHOLESTEROLEMIA: Status: ACTIVE | Noted: 2023-04-17

## 2023-04-17 PROBLEM — K59.03 CONSTIPATION DUE TO OPIOID THERAPY: Status: ACTIVE | Noted: 2023-04-17

## 2023-04-17 PROBLEM — T40.2X5A CONSTIPATION DUE TO OPIOID THERAPY: Status: ACTIVE | Noted: 2023-04-17

## 2023-04-17 PROBLEM — Z86.010 HISTORY OF COLONIC POLYPS: Status: ACTIVE | Noted: 2022-04-13

## 2023-04-17 PROBLEM — K57.92 DIVERTICULITIS: Status: ACTIVE | Noted: 2023-04-17

## 2023-04-17 PROBLEM — K57.30 DVRTCLOS OF LG INT W/O PERFORATION OR ABSCESS W/O BLEEDING: Status: ACTIVE | Noted: 2022-04-13

## 2023-04-17 PROBLEM — R10.30 LOWER ABDOMINAL PAIN, UNSPECIFIED: Status: ACTIVE | Noted: 2023-04-17

## 2023-04-17 RX ORDER — HYDRALAZINE HYDROCHLORIDE 50 MG/1
50 TABLET, FILM COATED ORAL DAILY
Qty: 30 TABLET | Refills: 2 | Status: SHIPPED | OUTPATIENT
Start: 2023-04-17

## 2023-04-17 RX ORDER — HYDROXYZINE 50 MG/1
TABLET, FILM COATED ORAL
Qty: 14 TABLET | Refills: 0 | Status: SHIPPED | OUTPATIENT
Start: 2023-04-17

## 2023-04-17 NOTE — PROGRESS NOTES
Melodie Carlos is a 58 y.o. female.     History of Present Illness  58-year-old white female with history hypertension, hyperlipidemia, sleep apnea, chronic back pain stable to pain management who comes in today to be established as new patient    Blood pressure 150/92 heart rate 84 with small systolic murmur she denies any chest pain, dyspnea, tachycardia or dizziness.  She is currently on 10 mg of amlodipine.  We will add 50 mg hydralazine.  Patient to monitor blood pressures    Patient having a hard time sleeping we will give her a trial of hydroxyzine to take at bedtime to see if that helps her sleep.  Patient to call me if she wants to stay on this medication    She has also been experiencing left foot pain without any known injury will x-ray today    She complains of not being able to sleep because she urinates all the time that all however due to dry mouth she drinks water before bed and off to the night.  I recommended she try to get a facemask that covers her mouth for her CPAP and try sugar-free hard candy instead of drinking so much water.    Weight is 177 with a BMI of 31.4.  She is up-to-date on COVID vaccines need to schedule an eye exam and I am scheduling her a mammogram and DEXA scan at [facility] radiology per her request.  She does have an OB/GYN appointment set up for Pap smears and her next colonoscopy is due in 2032            Blood work  Hydroxyzine 50 mg 1/2 to 2 tablets 30 minutes before bed trial  Monitor blood pressures  Hydralazine 50 mg  Mammogram/DEXA scan  Left foot x-ray  Schedule eye exam  Stop drinking water before bedtime and during the night  Call [facility] about a mask to cover your mouth for your CPAP  Follow-up 6 months         The following portions of the patient's history were reviewed and updated as appropriate: allergies, current medications, past family history, past medical history, past social history, past surgical history and problem list.    Vitals:    04/17/23  "1348   BP: 150/92   BP Location: Right arm   Patient Position: Sitting   Cuff Size: Large Adult   Pulse: 84   Temp: 97.3 °F (36.3 °C)   TempSrc: Temporal   SpO2: 98%   Weight: 80.4 kg (177 lb 3.2 oz)   Height: 160 cm (63\")     Body mass index is 31.39 kg/m².    Past Medical History:   Diagnosis Date   • Anemia    • Cerebrovascular accident (CVA) 3/8/2012    x2   • Hyperlipidemia    • Hypertension      Past Surgical History:   Procedure Laterality Date   • COLONOSCOPY     • ELBOW ARTHROSCOPY Right 2004    2x   • HYSTERECTOMY     • WRIST FRACTURE SURGERY       Family History   Problem Relation Age of Onset   • Lung cancer Mother    • Heart disease Father    • No Known Problems Sister    • No Known Problems Brother    • No Known Problems Brother      Immunization History   Administered Date(s) Administered   • COVID-19 (MODERNA) 1st, 2nd, 3rd Dose Only 03/24/2021, 04/22/2021   • Tdap 09/14/2016       Clinical Support on 02/22/2023   Component Date Value Ref Range Status   • WBC 02/22/2023 10.53  3.40 - 10.80 10*3/mm3 Final   • RBC 02/22/2023 4.46  3.77 - 5.28 10*6/mm3 Final   • Hemoglobin 02/22/2023 13.0  12.0 - 15.9 g/dL Final   • Hematocrit 02/22/2023 38.9  34.0 - 46.6 % Final   • MCV 02/22/2023 87.2  79.0 - 97.0 fL Final   • MCH 02/22/2023 29.1  26.6 - 33.0 pg Final   • MCHC 02/22/2023 33.4  31.5 - 35.7 g/dL Final   • RDW 02/22/2023 12.9  12.3 - 15.4 % Final   • RDW-SD 02/22/2023 41.2  37.0 - 54.0 fl Final   • MPV 02/22/2023 12.8 (H)  6.0 - 12.0 fL Final   • Platelets 02/22/2023 285  140 - 450 10*3/mm3 Final   • Neutrophil % 02/22/2023 67.2  42.7 - 76.0 % Final   • Lymphocyte % 02/22/2023 19.3 (L)  19.6 - 45.3 % Final   • Monocyte % 02/22/2023 7.2  5.0 - 12.0 % Final   • Eosinophil % 02/22/2023 5.3  0.3 - 6.2 % Final   • Basophil % 02/22/2023 0.6  0.0 - 1.5 % Final   • Immature Grans % 02/22/2023 0.4  0.0 - 0.5 % Final   • Neutrophils, Absolute 02/22/2023 7.08 (H)  1.70 - 7.00 10*3/mm3 Final   • Lymphocytes, " Absolute 02/22/2023 2.03  0.70 - 3.10 10*3/mm3 Final   • Monocytes, Absolute 02/22/2023 0.76  0.10 - 0.90 10*3/mm3 Final   • Eosinophils, Absolute 02/22/2023 0.56 (H)  0.00 - 0.40 10*3/mm3 Final   • Basophils, Absolute 02/22/2023 0.06  0.00 - 0.20 10*3/mm3 Final   • Immature Grans, Absolute 02/22/2023 0.04  0.00 - 0.05 10*3/mm3 Final   • nRBC 02/22/2023 0.0  0.0 - 0.2 /100 WBC Final   • Glucose 02/22/2023 89  65 - 99 mg/dL Final   • BUN 02/22/2023 16  8 - 23 mg/dL Final   • Creatinine 02/22/2023 0.76  0.57 - 1.00 mg/dL Final   • Sodium 02/22/2023 138  136 - 145 mmol/L Final   • Potassium 02/22/2023 3.9  3.5 - 5.2 mmol/L Final   • Chloride 02/22/2023 96 (L)  98 - 107 mmol/L Final   • CO2 02/22/2023 32.0 (H)  22.0 - 29.0 mmol/L Final   • Calcium 02/22/2023 9.8  8.6 - 10.5 mg/dL Final   • Total Protein 02/22/2023 7.6  6.0 - 8.5 g/dL Final   • Albumin 02/22/2023 4.3  3.5 - 5.2 g/dL Final   • ALT (SGPT) 02/22/2023 26  1 - 33 U/L Final   • AST (SGOT) 02/22/2023 21  1 - 32 U/L Final   • Alkaline Phosphatase 02/22/2023 121 (H)  39 - 117 U/L Final   • Total Bilirubin 02/22/2023 0.4  0.0 - 1.2 mg/dL Final   • Globulin 02/22/2023 3.3  gm/dL Final   • A/G Ratio 02/22/2023 1.3  g/dL Final   • BUN/Creatinine Ratio 02/22/2023 21.1  7.0 - 25.0 Final   • Anion Gap 02/22/2023 10.0  5.0 - 15.0 mmol/L Final   • eGFR 02/22/2023 89.8  >60.0 mL/min/1.73 Final   • Total Cholesterol 02/22/2023 162  0 - 200 mg/dL Final   • Triglycerides 02/22/2023 100  0 - 150 mg/dL Final   • HDL Cholesterol 02/22/2023 65 (H)  40 - 60 mg/dL Final   • LDL Cholesterol  02/22/2023 79  0 - 100 mg/dL Final   • VLDL Cholesterol 02/22/2023 18  5 - 40 mg/dL Final   • LDL/HDL Ratio 02/22/2023 1.18   Final   • TSH 02/22/2023 2.780  0.270 - 4.200 uIU/mL Final   • Vitamin B-12 02/22/2023 543  211 - 946 pg/mL Final   • Magnesium 02/22/2023 2.2  1.6 - 2.4 mg/dL Final   • 25 Hydroxy, Vitamin D 02/22/2023 39.5  30.0 - 100.0 ng/ml Final   • Color, UA 02/22/2023 Yellow   Yellow, Straw Final   • Appearance, UA 02/22/2023 Clear  Clear Final   • pH, UA 02/22/2023 6.5  5.0 - 8.0 Final   • Specific Gravity, UA 02/22/2023 1.016  1.005 - 1.030 Final   • Glucose, UA 02/22/2023 Negative  Negative Final   • Ketones, UA 02/22/2023 Negative  Negative Final   • Bilirubin, UA 02/22/2023 Negative  Negative Final   • Blood, UA 02/22/2023 Negative  Negative Final   • Protein, UA 02/22/2023 Negative  Negative Final   • Leuk Esterase, UA 02/22/2023 Trace (A)  Negative Final   • Nitrite, UA 02/22/2023 Negative  Negative Final   • Urobilinogen, UA 02/22/2023 0.2 E.U./dL  0.2 - 1.0 E.U./dL Final   • RBC, UA 02/22/2023 0-2  None Seen, 0-2 /HPF Final   • WBC, UA 02/22/2023 0-2  None Seen, 0-2 /HPF Final   • Bacteria, UA 02/22/2023 None Seen  None Seen /HPF Final   • Squamous Epithelial Cells, UA 02/22/2023 0-2  None Seen, 0-2 /HPF Final   • Hyaline Casts, UA 02/22/2023 None Seen  None Seen /LPF Final   • Methodology 02/22/2023 Automated Microscopy   Final         Review of Systems   Constitutional: Negative.    HENT: Negative.         Dry mouth   Cardiovascular: Negative.    Gastrointestinal: Negative.    Genitourinary: Negative.    Musculoskeletal: Negative.    Skin: Negative.    Neurological: Negative.    Psychiatric/Behavioral: Negative.        Objective   Physical Exam  Constitutional:       Appearance: Normal appearance.   HENT:      Head: Normocephalic.   Cardiovascular:      Rate and Rhythm: Normal rate and regular rhythm.      Pulses: Normal pulses.      Heart sounds: Murmur heard.   Pulmonary:      Effort: Pulmonary effort is normal.      Breath sounds: Normal breath sounds.   Abdominal:      General: Bowel sounds are normal.   Musculoskeletal:         General: Normal range of motion.   Skin:     General: Skin is warm and dry.   Neurological:      General: No focal deficit present.      Mental Status: She is alert and oriented to person, place, and time.   Psychiatric:         Mood and Affect:  Mood normal.         Behavior: Behavior normal.         Procedures    Assessment & Plan   Diagnoses and all orders for this visit:    1. Post-menopausal (Primary)  -     DEXA Bone Density Axial; Future    2. Chronic heel pain, left  -     XR Foot 3+ View Left (In Office)    3. Abnormal TSH    4. Lipid screening  -     Lipid Panel With LDL / HDL Ratio; Future    5. Preventative health care    6. Family history of diabetes mellitus  -     Hemoglobin A1c; Future  -     Comprehensive Metabolic Panel; Future    7. Primary hypertension    8. Former smoker    9. Obstructive sleep apnea syndrome    10. Class 1 obesity due to excess calories with serious comorbidity and body mass index (BMI) of 31.0 to 31.9 in adult    11. Overweight    12. Dry mouth    Other orders  -     hydrOXYzine (ATARAX) 50 MG tablet; 1/2 to 2 tabs 30 minutes before bed  Dispense: 14 tablet; Refill: 0  -     hydrALAZINE (APRESOLINE) 50 MG tablet; Take 1 tablet by mouth Daily.  Dispense: 30 tablet; Refill: 2          Current Outpatient Medications:   •  amLODIPine (NORVASC) 10 MG tablet, Take 1 tablet by mouth Daily., Disp: 90 tablet, Rfl: 3  •  atorvastatin (LIPITOR) 80 MG tablet, Take 1 tablet by mouth Daily., Disp: 90 tablet, Rfl: 3  •  chlorthalidone (HYGROTON) 25 MG tablet, Take 1 tablet by mouth Daily., Disp: , Rfl:   •  ezetimibe (Zetia) 10 MG tablet, Take 1 tablet by mouth Daily., Disp: 90 tablet, Rfl: 3  •  HYDROcodone-acetaminophen (NORCO)  MG per tablet, Take 1 tablet by mouth Every 6 (Six) Hours., Disp: , Rfl: 0  •  tiZANidine (ZANAFLEX) 4 MG tablet, Take 1 tablet by mouth every night at bedtime., Disp: , Rfl: 5  •  hydrOXYzine (ATARAX) 50 MG tablet, 1/2 to 2 tabs 30 minutes before bed, Disp: 14 tablet, Rfl: 0  No current facility-administered medications for this visit.           RUBIN Stoddard 4/17/2023 15:30 EDT  This note has been electronically signed

## 2023-04-17 NOTE — PROGRESS NOTES
April 17, 2023    Hello, may I speak with Melodie Carlos?    My name is Ale    I am  with [facility]  [facility]  [address]  [place] [address].    Before we get started may I verify your date of birth? 1965    I am calling to officially welcome you to our practice and ask about your recent visit. Is this a good time to talk? yes    Tell me about your visit with us. What things went well?  It was good, I really enjoyed it.         We're always looking for ways to make our patients' experiences even better. Do you have recommendations on ways we may improve?  no    Overall were you satisfied with your first visit to our practice? yes       I appreciate you taking the time to speak with me today. Is there anything else I can do for you? no      Thank you, and have a great day.

## 2023-04-17 NOTE — PATIENT INSTRUCTIONS
Blood work  Hydroxyzine 50 mg 1/2 to 2 tablets 30 minutes before bed trial  Hydralzine 50mg daily  Monitor blood pressures  Mammogram/DEXA scan  Left foot x-ray  Schedule eye exam  Stop drinking water before bedtime and during the night  Call [facility] about a mask to cover your mouth for your CPAP  Follow-up 6 months

## 2023-05-11 NOTE — PROGRESS NOTES
EMG and Nerve Conduction Studies    Patient Name: Melodie Carlos    Date of Study: 5/15/23    Referring Provider:DR. HARTMAN    History:  This patient is a 58-year-old female who has had low back pain with some pain in paresthesia in the left leg.  She reports she recently had an injection in the hip and the pain clinic with marked improvement in all of her symptoms.    Results:    The complete report includes the data sheets.     Prior to starting the procedure, the patient's identity was verified, pertinent available records were reviewed, the nature of the procedure was explained, the appropriate site of the exam were confirmed directly with the patient, and a pre-procedure pause was performed for final verification of all the above.    1.  The left sural sensory nerve conduction study was normal.    2.  The left tibial and peroneal motor nerve conduction studies were normal with normal corresponding F-wave latencies.    3.  EMG of the muscles examined in the left C5-T1 myotomes were normal.    Impression:    This is a normal study of the left lower extremity.  There is no evidence of generalized polyneuropathy and no sign of neurogenic change in the muscles examined in the left C5-T1 myotomes.      Electronically signed by :    Joseph Seipel, M.D.  May 15, 2023

## 2023-05-15 ENCOUNTER — PROCEDURE VISIT (OUTPATIENT)
Dept: NEUROLOGY | Facility: CLINIC | Age: 58
End: 2023-05-15
Payer: MEDICARE

## 2023-05-15 VITALS
SYSTOLIC BLOOD PRESSURE: 164 MMHG | BODY MASS INDEX: 31.36 KG/M2 | HEIGHT: 63 IN | WEIGHT: 177 LBS | HEART RATE: 69 BPM | DIASTOLIC BLOOD PRESSURE: 96 MMHG

## 2023-05-15 DIAGNOSIS — R20.2 PARESTHESIA OF LEFT LEG: Primary | ICD-10-CM

## 2023-05-15 DIAGNOSIS — M54.50 LOW BACK PAIN, UNSPECIFIED BACK PAIN LATERALITY, UNSPECIFIED CHRONICITY, UNSPECIFIED WHETHER SCIATICA PRESENT: ICD-10-CM

## 2023-05-15 DIAGNOSIS — G58.7 MONONEUROPATHY MULTIPLEX: ICD-10-CM

## 2023-08-13 RX ORDER — HYDRALAZINE HYDROCHLORIDE 50 MG/1
TABLET, FILM COATED ORAL
Qty: 30 TABLET | Refills: 2 | Status: SHIPPED | OUTPATIENT
Start: 2023-08-13

## 2023-08-23 ENCOUNTER — OFFICE VISIT (OUTPATIENT)
Dept: FAMILY MEDICINE CLINIC | Facility: CLINIC | Age: 58
End: 2023-08-23
Payer: MEDICARE

## 2023-08-23 VITALS
HEART RATE: 80 BPM | HEIGHT: 63 IN | WEIGHT: 176 LBS | DIASTOLIC BLOOD PRESSURE: 85 MMHG | SYSTOLIC BLOOD PRESSURE: 145 MMHG | TEMPERATURE: 97.5 F | BODY MASS INDEX: 31.18 KG/M2 | OXYGEN SATURATION: 93 %

## 2023-08-23 DIAGNOSIS — G47.33 OBSTRUCTIVE SLEEP APNEA SYNDROME: ICD-10-CM

## 2023-08-23 DIAGNOSIS — E66.09 CLASS 1 OBESITY DUE TO EXCESS CALORIES WITH SERIOUS COMORBIDITY AND BODY MASS INDEX (BMI) OF 31.0 TO 31.9 IN ADULT: ICD-10-CM

## 2023-08-23 DIAGNOSIS — D64.9 ANEMIA, UNSPECIFIED TYPE: ICD-10-CM

## 2023-08-23 DIAGNOSIS — E78.5 HYPERLIPIDEMIA, UNSPECIFIED HYPERLIPIDEMIA TYPE: Primary | ICD-10-CM

## 2023-08-23 DIAGNOSIS — I10 PRIMARY HYPERTENSION: ICD-10-CM

## 2023-08-23 DIAGNOSIS — G47.09 OTHER INSOMNIA: ICD-10-CM

## 2023-08-23 DIAGNOSIS — Z00.00 PREVENTATIVE HEALTH CARE: ICD-10-CM

## 2023-08-23 DIAGNOSIS — R73.09 ELEVATED HEMOGLOBIN A1C: ICD-10-CM

## 2023-08-23 RX ORDER — HYDRALAZINE HYDROCHLORIDE 50 MG/1
50 TABLET, FILM COATED ORAL 2 TIMES DAILY
Qty: 180 TABLET | Refills: 1 | Status: SHIPPED | OUTPATIENT
Start: 2023-08-23

## 2023-08-23 RX ORDER — HYDROXYZINE 50 MG/1
TABLET, FILM COATED ORAL
Qty: 180 TABLET | Refills: 1 | Status: SHIPPED | OUTPATIENT
Start: 2023-08-23

## 2023-08-23 NOTE — PATIENT INSTRUCTIONS
Fasting blood work  Increase hydralazine to 50 mg twice daily  Monitor blood pressure with parameters given  Hydroxyzine 50 mg 1-2 nightly for sleep  Find out about mammogram and DEXA scan  Diet and exercise  Monitor sodium intake  Follow-up 3 6 months

## 2023-08-23 NOTE — PROGRESS NOTES
Melodie Carlos is a 58 y.o. female.     History of Present Illness  58-year-old white female with history hypertension, hyperlipidemia, sleep apnea, chronic back pain who goes to pain management who comes in today for follow-up visit    Blood pressure 140/84 heart rate 80 with small systolic murmur she denies any chest pain, dyspnea, tachycardia or dizziness.  On last visit I put her on hydralazine 50 mg daily will increase it to twice a day and gave her parameters to monitor blood pressure    Patient's is a prediabetic A1c 6.0 blood sugars have been normal she knows to monitor her blood sugars.    She states 1 day her ankle swelled up got a little short of breath but it is the last time it happened.  She did eat out at a Mexican restaurant that day probably had too much sodium.  I told her to watch sodium intake and let me know if the problem occurs again    I did place her hydroxyzine last visit for sleep and it did help a great deal and I reordered it for her today    She has sleep apnea but is not using her CPAP machine due to complications with the initial machine she got from [facility] and states she just got frustrated with the whole thing and stopped wearing it    She is up-to-date on COVID-vaccine she did schedule an eye exam she thought [facility] but is not in the chart she is going to check into that and she still has not got her DEXA scan and she plans to schedule.  Her next colonoscopy is due in 2032        Fasting blood work  Increase hydralazine to 50 mg twice daily  Monitor blood pressure with parameters given  Hydroxyzine 50 mg 1-2 nightly for sleep  Find out about mammogram and DEXA scan  Diet and exercise  Monitor sodium intake  Follow-up 3 6 months     The following portions of the patient's history were reviewed and updated as appropriate: allergies, current medications, past family history, past medical history, past social history, past surgical history,  "and problem list.    Vitals:    08/23/23 1054   BP: 145/85   BP Location: Right arm   Patient Position: Sitting   Cuff Size: Adult   Pulse: 80   Temp: 97.5 øF (36.4 øC)   TempSrc: Infrared   SpO2: 93%   Weight: 79.8 kg (176 lb)   Height: 160 cm (62.99\")     Body mass index is 31.18 kg/mý.    Past Medical History:   Diagnosis Date    Anemia     Cerebrovascular accident (CVA) 3/8/2012    x2    Hyperlipidemia     Hypertension      Past Surgical History:   Procedure Laterality Date    COLONOSCOPY      ELBOW ARTHROSCOPY Right 2004    2x    HYSTERECTOMY      WRIST FRACTURE SURGERY       Family History   Problem Relation Age of Onset    Lung cancer Mother     Heart disease Father     No Known Problems Sister     No Known Problems Brother     No Known Problems Brother      Immunization History   Administered Date(s) Administered    COVID-19 (MODERNA) 1st,2nd,3rd Dose Monovalent 03/24/2021, 04/22/2021    Tdap 09/14/2016       Results Encounter on 04/22/2023   Component Date Value Ref Range Status    Total Cholesterol 05/31/2023 141  100 - 199 mg/dL Final    Triglycerides 05/31/2023 56  0 - 149 mg/dL Final    HDL Cholesterol 05/31/2023 77  >39 mg/dL Final    VLDL Cholesterol Compa 05/31/2023 12  5 - 40 mg/dL Final    LDL Chol Calc (NIH) 05/31/2023 52  0 - 99 mg/dL Final    LDL/HDL RATIO 05/31/2023 0.7  0.0 - 3.2 ratio Final    Comment:                                     LDL/HDL Ratio                                              Men  Women                                1/2 Avg.Risk  1.0    1.5                                    Avg.Risk  3.6    3.2                                 2X Avg.Risk  6.2    5.0                                 3X Avg.Risk  8.0    6.1      Hemoglobin A1C 05/31/2023 6.0 (H)  4.8 - 5.6 % Final    Comment:          Prediabetes: 5.7 - 6.4           Diabetes: >6.4           Glycemic control for adults with diabetes: <7.0      Glucose 05/31/2023 89  70 - 99 mg/dL Final    BUN 05/31/2023 17  8 - 27 mg/dL Final "    Creatinine 05/31/2023 0.87  0.57 - 1.00 mg/dL Final    EGFR Result 05/31/2023 76  >59 mL/min/1.73 Final    BUN/Creatinine Ratio 05/31/2023 20  12 - 28 Final    Sodium 05/31/2023 139  134 - 144 mmol/L Final    Potassium 05/31/2023 4.5  3.5 - 5.2 mmol/L Final    Chloride 05/31/2023 98  96 - 106 mmol/L Final    Total CO2 05/31/2023 27  20 - 29 mmol/L Final    Calcium 05/31/2023 9.4  8.7 - 10.3 mg/dL Final    Total Protein 05/31/2023 6.7  6.0 - 8.5 g/dL Final    Albumin 05/31/2023 4.5  3.8 - 4.9 g/dL Final    Globulin 05/31/2023 2.2  1.5 - 4.5 g/dL Final    A/G Ratio 05/31/2023 2.0  1.2 - 2.2 Final    Total Bilirubin 05/31/2023 0.5  0.0 - 1.2 mg/dL Final    Alkaline Phosphatase 05/31/2023 104  44 - 121 IU/L Final    AST (SGOT) 05/31/2023 18  0 - 40 IU/L Final    ALT (SGPT) 05/31/2023 22  0 - 32 IU/L Final         Review of Systems   Constitutional: Negative.    HENT: Negative.     Respiratory: Negative.     Cardiovascular: Negative.    Gastrointestinal: Negative.    Genitourinary: Negative.    Musculoskeletal: Negative.    Skin: Negative.    Neurological: Negative.    Psychiatric/Behavioral:  Positive for sleep disturbance.      Objective   Physical Exam  Constitutional:       Appearance: Normal appearance.   HENT:      Head: Normocephalic.   Cardiovascular:      Rate and Rhythm: Normal rate and regular rhythm.      Pulses: Normal pulses.      Heart sounds: Murmur heard.   Pulmonary:      Effort: Pulmonary effort is normal.      Breath sounds: Normal breath sounds.   Abdominal:      General: Bowel sounds are normal.   Musculoskeletal:         General: Normal range of motion.   Skin:     General: Skin is warm.   Neurological:      General: No focal deficit present.      Mental Status: She is alert and oriented to person, place, and time.   Psychiatric:         Mood and Affect: Mood normal.         Behavior: Behavior normal.       Procedures    Assessment & Plan   Diagnoses and all orders for this visit:    1.  Hyperlipidemia, unspecified hyperlipidemia type (Primary)  -     Lipid Panel With LDL / HDL Ratio; Future    2. Anemia, unspecified type  -     Cancel: CBC & Differential; Future  -     CBC & Differential; Future    3. Elevated hemoglobin A1c  -     Hemoglobin A1c; Future    4. Preventative health care  -     Comprehensive Metabolic Panel; Future    5. Class 1 obesity due to excess calories with serious comorbidity and body mass index (BMI) of 31.0 to 31.9 in adult    6. Primary hypertension    7. Other insomnia    8. Obstructive sleep apnea syndrome    Other orders  -     hydrALAZINE (APRESOLINE) 50 MG tablet; Take 1 tablet by mouth 2 (Two) Times a Day.  Dispense: 180 tablet; Refill: 1  -     hydrOXYzine (ATARAX) 50 MG tablet; 1/2 to 2 tabs 30 minutes before bed  Dispense: 180 tablet; Refill: 1           Current Outpatient Medications:     amLODIPine (NORVASC) 10 MG tablet, Take 1 tablet by mouth Daily., Disp: 90 tablet, Rfl: 3    atorvastatin (LIPITOR) 80 MG tablet, Take 1 tablet by mouth Daily., Disp: 90 tablet, Rfl: 3    chlorthalidone (HYGROTON) 25 MG tablet, Take 1 tablet by mouth Daily., Disp: 90 tablet, Rfl: 1    ezetimibe (Zetia) 10 MG tablet, Take 1 tablet by mouth Daily., Disp: 90 tablet, Rfl: 3    hydrALAZINE (APRESOLINE) 50 MG tablet, Take 1 tablet by mouth 2 (Two) Times a Day., Disp: 180 tablet, Rfl: 1    HYDROcodone-acetaminophen (NORCO)  MG per tablet, Take 1 tablet by mouth Every 6 (Six) Hours., Disp: , Rfl: 0    hydrOXYzine (ATARAX) 50 MG tablet, 1/2 to 2 tabs 30 minutes before bed, Disp: 180 tablet, Rfl: 1    tiZANidine (ZANAFLEX) 4 MG tablet, Take 1 tablet by mouth every night at bedtime., Disp: , Rfl: 5           Shiloh Salazar, APRN 8/23/2023 11:34 EDT  This note has been electronically signed

## 2023-10-06 RX ORDER — EZETIMIBE 10 MG/1
TABLET ORAL
Qty: 90 TABLET | Refills: 0 | Status: SHIPPED | OUTPATIENT
Start: 2023-10-06

## 2023-10-06 RX ORDER — ATORVASTATIN CALCIUM 80 MG/1
TABLET, FILM COATED ORAL
Qty: 90 TABLET | Refills: 3 | Status: SHIPPED | OUTPATIENT
Start: 2023-10-06

## 2023-11-06 RX ORDER — EZETIMIBE 10 MG/1
TABLET ORAL
Qty: 90 TABLET | Refills: 0 | Status: SHIPPED | OUTPATIENT
Start: 2023-11-06

## 2023-11-06 RX ORDER — AMLODIPINE BESYLATE 10 MG/1
10 TABLET ORAL DAILY
Qty: 90 TABLET | Refills: 3 | Status: SHIPPED | OUTPATIENT
Start: 2023-11-06

## 2023-11-06 NOTE — TELEPHONE ENCOUNTER
Rx Refill Note  Requested Prescriptions     Pending Prescriptions Disp Refills    amLODIPine (NORVASC) 10 MG tablet [Pharmacy Med Name: amlodipine 10 mg tablet] 90 tablet 3     Sig: TAKE ONE TABLET BY MOUTH EVERY DAY      Last office visit with prescribing clinician: 10/10/2022   Last telemedicine visit with prescribing clinician: Visit date not found   Next office visit with prescribing clinician: Visit date not found                         Would you like a call back once the refill request has been completed: [] Yes [] No    If the office needs to give you a call back, can they leave a voicemail: [] Yes [] No    Ginger Ramirez MA  11/06/23, 09:41 EST

## 2023-11-13 ENCOUNTER — TELEPHONE (OUTPATIENT)
Dept: FAMILY MEDICINE CLINIC | Facility: CLINIC | Age: 58
End: 2023-11-13

## 2023-11-15 RX ORDER — HYDROXYZINE 50 MG/1
TABLET, FILM COATED ORAL
Qty: 180 TABLET | Refills: 0 | Status: SHIPPED | OUTPATIENT
Start: 2023-11-15

## 2023-12-04 ENCOUNTER — OFFICE VISIT (OUTPATIENT)
Dept: FAMILY MEDICINE CLINIC | Facility: CLINIC | Age: 58
End: 2023-12-04
Payer: MEDICARE

## 2023-12-04 VITALS
OXYGEN SATURATION: 95 % | BODY MASS INDEX: 31.18 KG/M2 | HEIGHT: 63 IN | WEIGHT: 176 LBS | HEART RATE: 62 BPM | DIASTOLIC BLOOD PRESSURE: 72 MMHG | TEMPERATURE: 97.3 F | SYSTOLIC BLOOD PRESSURE: 128 MMHG

## 2023-12-04 DIAGNOSIS — R73.09 ELEVATED HEMOGLOBIN A1C: ICD-10-CM

## 2023-12-04 DIAGNOSIS — E78.5 HYPERLIPIDEMIA, UNSPECIFIED HYPERLIPIDEMIA TYPE: ICD-10-CM

## 2023-12-04 DIAGNOSIS — R01.1 HEART MURMUR: ICD-10-CM

## 2023-12-04 DIAGNOSIS — G47.09 OTHER INSOMNIA: ICD-10-CM

## 2023-12-04 DIAGNOSIS — Z12.2 SCREENING FOR LUNG CANCER: Primary | ICD-10-CM

## 2023-12-04 DIAGNOSIS — Z87.891 PERSONAL HISTORY OF NICOTINE DEPENDENCE: ICD-10-CM

## 2023-12-04 DIAGNOSIS — E66.09 CLASS 1 OBESITY DUE TO EXCESS CALORIES WITH SERIOUS COMORBIDITY AND BODY MASS INDEX (BMI) OF 31.0 TO 31.9 IN ADULT: ICD-10-CM

## 2023-12-04 DIAGNOSIS — E07.9 THYROID DISEASE: ICD-10-CM

## 2023-12-04 DIAGNOSIS — G47.33 OBSTRUCTIVE SLEEP APNEA SYNDROME: ICD-10-CM

## 2023-12-04 NOTE — PROGRESS NOTES
"    Melodie Carlos is a 58 y.o. female.     History of Present Illness  58-year-old white female with history hypertension, hyperlipidemia, sleep apnea, chronic back pain who goes to pain management, CVA x 2 who comes in today for Medicare wellness visit    Blood pressure 128/72 heart rate 62 with small systolic murmur she denies any chest pain, dyspnea, tachycardia or dizziness. On last visit placed patient on  hydralazine 50 mg twice a day for blood pressure and it is doing very well she has an appointment in December with cardiology    Patient is an ex-smoker did get a letter in the mail that it is time to order her low-dose CT scan.  Will order at [facility] and gave patient the number to schedule    Also placed her on some hydroxyzine for sleep which she states has helped a little bit but she wakes up every 2 hours.  She has sleep apnea is not wearing her CPAP because she had so much trouble with it not working.  Will refer her back to neurology and will go through [facility] [place] instead of [facility] for her equipment this time    Weight is 176 with a BMI of 31.2.  She has had 2 COVID vaccines up-to-date on eye exam had a mammogram through [facility] this year but is not in the chart will call and try to find that.  Her next DEXA scan is due in September 2025 and her next colonoscopy is due in 2032            Fasting blood work  Neurology referral  CT chest low-dose without at [facility]  Keep appointment with cardiology  Diet and exercise  Follow-up 6 months         The following portions of the patient's history were reviewed and updated as appropriate: allergies, current medications, past family history, past medical history, past social history, past surgical history, and problem list.    Vitals:    12/04/23 0851   BP: 128/72   BP Location: Right arm   Patient Position: Sitting   Cuff Size: Adult   Pulse: 62   Temp: 97.3 °F (36.3 °C)   TempSrc: Infrared   SpO2: 95%   Weight: 79.8 kg (176 lb)   Height: 160 cm (62.99\")     Body " mass index is 31.18 kg/m².    Past Medical History:   Diagnosis Date    Anemia     Cerebrovascular accident (CVA) 3/8/2012    x2    Hyperlipidemia     Hypertension      Past Surgical History:   Procedure Laterality Date    COLONOSCOPY      ELBOW ARTHROSCOPY Right 2004    2x    HYSTERECTOMY      WRIST FRACTURE SURGERY       Family History   Problem Relation Age of Onset    Lung cancer Mother     Heart disease Father     No Known Problems Sister     No Known Problems Brother     No Known Problems Brother      Immunization History   Administered Date(s) Administered    COVID-19 (MODERNA) 1st,2nd,3rd Dose Monovalent 03/24/2021, 04/22/2021    Tdap 09/14/2016       Admission on 11/11/2023, Discharged on 11/11/2023   Component Date Value Ref Range Status    Rapid Strep A Screen 11/11/2023 Negative   Final    Internal Control 11/11/2023 Passed   Final    Lot Number 11/11/2023 [id number]   Final    Expiration Date 11/11/2023 02/27/2025   Final         Review of Systems   Psychiatric/Behavioral:  Positive for sleep disturbance.        Objective   Physical Exam  Constitutional:       Appearance: Normal appearance.   HENT:      Head: Normocephalic.   Cardiovascular:      Rate and Rhythm: Normal rate and regular rhythm.      Pulses: Normal pulses.      Heart sounds: Normal heart sounds. Murmur heard.   Pulmonary:      Effort: Pulmonary effort is normal.      Breath sounds: Normal breath sounds.   Abdominal:      General: Bowel sounds are normal.   Musculoskeletal:         General: Normal range of motion.   Skin:     General: Skin is warm.   Neurological:      General: No focal deficit present.      Mental Status: She is alert and oriented to person, place, and time.   Psychiatric:         Mood and Affect: Mood normal.         Behavior: Behavior normal.         Procedures    Assessment & Plan   Diagnoses and all orders for this visit:    1. Screening for lung cancer (Primary)  -     CT Chest Low Dose Wo; Future    2. Hyperlipidemia,  unspecified hyperlipidemia type  -     Lipid Panel With LDL / HDL Ratio    3. Elevated hemoglobin A1c  -     Hemoglobin A1c  -     Comprehensive Metabolic Panel    4. Thyroid disease  -     TSH+Free T4  -     T3    5. Personal history of nicotine dependence  -     CT Chest Low Dose Wo; Future    6. Heart murmur    7. Class 1 obesity due to excess calories with serious comorbidity and body mass index (BMI) of 31.0 to 31.9 in adult    8. Obstructive sleep apnea syndrome    9. Other insomnia           Current Outpatient Medications:     amLODIPine (NORVASC) 10 MG tablet, TAKE ONE TABLET BY MOUTH EVERY DAY, Disp: 90 tablet, Rfl: 3    atorvastatin (LIPITOR) 80 MG tablet, TAKE ONE TABLET BY MOUTH EVERY DAY, Disp: 90 tablet, Rfl: 3    chlorthalidone (HYGROTON) 25 MG tablet, Take 1 tablet by mouth Daily., Disp: 90 tablet, Rfl: 1    diclofenac (VOLTAREN) 75 MG EC tablet, Take 1 tablet by mouth Every 12 (Twelve) Hours., Disp: , Rfl:     ezetimibe (ZETIA) 10 MG tablet, TAKE ONE TABLET BY MOUTH EVERY DAY, Disp: 90 tablet, Rfl: 0    ezetimibe (ZETIA) 10 MG tablet, 0, Disp: , Rfl:     hydrALAZINE (APRESOLINE) 50 MG tablet, Take 1 tablet by mouth 2 (Two) Times a Day., Disp: 180 tablet, Rfl: 1    HYDROcodone-acetaminophen (NORCO)  MG per tablet, Take 1 tablet by mouth Every 6 (Six) Hours., Disp: , Rfl: 0    hydrOXYzine (ATARAX) 50 MG tablet, TAKE 1/2 TO 2 TABLETS 30 MINUTES BEFORE BED, Disp: 180 tablet, Rfl: 0    tiZANidine (ZANAFLEX) 4 MG tablet, Take 1 tablet by mouth every night at bedtime., Disp: , Rfl: 5           Shiloh Salazar, APRN 12/4/2023 09:41 EST  This note has been electronically signed

## 2023-12-04 NOTE — PATIENT INSTRUCTIONS
Fasting blood work  Neurology referral  CT chest low-dose without at [facility]  Keep appointment with cardiology  Diet and exercise  Follow-up 6 months

## 2023-12-05 LAB
ALBUMIN SERPL-MCNC: 4.6 G/DL (ref 3.8–4.9)
ALBUMIN/GLOB SERPL: 1.9 {RATIO} (ref 1.2–2.2)
ALP SERPL-CCNC: 119 IU/L (ref 44–121)
ALT SERPL-CCNC: 25 IU/L (ref 0–32)
AST SERPL-CCNC: 19 IU/L (ref 0–40)
BILIRUB SERPL-MCNC: 0.3 MG/DL (ref 0–1.2)
BUN SERPL-MCNC: 17 MG/DL (ref 8–27)
BUN/CREAT SERPL: 19 (ref 12–28)
CALCIUM SERPL-MCNC: 9.8 MG/DL (ref 8.7–10.3)
CHLORIDE SERPL-SCNC: 99 MMOL/L (ref 96–106)
CHOLEST SERPL-MCNC: 143 MG/DL (ref 100–199)
CO2 SERPL-SCNC: 26 MMOL/L (ref 20–29)
CREAT SERPL-MCNC: 0.89 MG/DL (ref 0.57–1)
EGFRCR SERPLBLD CKD-EPI 2021: 74 ML/MIN/1.73
GLOBULIN SER CALC-MCNC: 2.4 G/DL (ref 1.5–4.5)
GLUCOSE SERPL-MCNC: 88 MG/DL (ref 70–99)
HBA1C MFR BLD: 5.9 % (ref 4.8–5.6)
HDLC SERPL-MCNC: 57 MG/DL
LDLC SERPL CALC-MCNC: 68 MG/DL (ref 0–99)
LDLC/HDLC SERPL: 1.2 RATIO (ref 0–3.2)
POTASSIUM SERPL-SCNC: 4.1 MMOL/L (ref 3.5–5.2)
PROT SERPL-MCNC: 7 G/DL (ref 6–8.5)
SODIUM SERPL-SCNC: 140 MMOL/L (ref 134–144)
T3 SERPL-MCNC: 130 NG/DL (ref 71–180)
T4 FREE SERPL-MCNC: 1.02 NG/DL (ref 0.82–1.77)
TRIGL SERPL-MCNC: 96 MG/DL (ref 0–149)
TSH SERPL DL<=0.005 MIU/L-ACNC: 5.27 UIU/ML (ref 0.45–4.5)
VLDLC SERPL CALC-MCNC: 18 MG/DL (ref 5–40)

## 2023-12-13 RX ORDER — LEVOTHYROXINE SODIUM 0.03 MG/1
25 TABLET ORAL
Qty: 90 TABLET | Refills: 0 | Status: SHIPPED | OUTPATIENT
Start: 2023-12-13

## 2023-12-14 ENCOUNTER — OFFICE VISIT (OUTPATIENT)
Dept: CARDIOLOGY | Facility: CLINIC | Age: 58
End: 2023-12-14
Payer: MEDICARE

## 2023-12-14 VITALS
WEIGHT: 178 LBS | SYSTOLIC BLOOD PRESSURE: 126 MMHG | HEART RATE: 84 BPM | HEIGHT: 63 IN | OXYGEN SATURATION: 96 % | BODY MASS INDEX: 31.54 KG/M2 | DIASTOLIC BLOOD PRESSURE: 73 MMHG

## 2023-12-14 DIAGNOSIS — E78.5 HYPERLIPIDEMIA, UNSPECIFIED HYPERLIPIDEMIA TYPE: Primary | ICD-10-CM

## 2023-12-14 DIAGNOSIS — R01.1 HEART MURMUR: ICD-10-CM

## 2023-12-14 DIAGNOSIS — Z86.73 HISTORY OF STROKE: ICD-10-CM

## 2023-12-14 DIAGNOSIS — I10 PRIMARY HYPERTENSION: ICD-10-CM

## 2023-12-14 RX ORDER — ASPIRIN 81 MG/1
81 TABLET ORAL DAILY
Qty: 180 TABLET | Refills: 3 | Status: SHIPPED | OUTPATIENT
Start: 2023-12-14

## 2023-12-14 NOTE — PROGRESS NOTES
Encounter Date:10/10/2022      Patient ID: Melodie Carlos is a 58 y.o. female.    Chief Complaint   Patient presents with    Hypertension    Follow-up    Hyperlipidemia          History of Present Illness    Melodie is a 57-year-old with past medical history of hypertension, hyperlipidemia and previous stroke who presents as a follow-up.  Since I last saw her she is doing really well.  She says her blood pressure is very well-controlled now.  Denies any dizziness or lightheadedness.  No chest pain, shortness of breath orthopnea or PND.  Blood pressure is well-controlled at home also.  No further episodes of chest pain.    Previous note:    Echo on 8/15/2022 was reviewed in clinic today and showed moderate concentric hypertrophy, EF 60%, mild dilatation of the ascending aorta measuring 3.8 cm.  She has severe left atrial enlargement.    For her hyperlipidemia she has been on Zetia.  She was prescribed Repatha but this was not covered by her insurance plan so she has not been using that.  She is currently not on any statins.    The following portions of the patient's history were reviewed and updated as appropriate: allergies, current medications, past family history, past medical history, past social history, past surgical history, and problem list.    Review of Systems   Constitutional: Negative for fever and malaise/fatigue.   Cardiovascular:  Positive for leg swelling. Negative for chest pain, dyspnea on exertion and palpitations.   Respiratory:  Negative for cough and shortness of breath.    Skin:  Negative for rash.   Gastrointestinal:  Negative for abdominal pain, nausea and vomiting.   Neurological:  Negative for focal weakness and headaches.   All other systems reviewed and are negative.        Current Outpatient Medications:     amLODIPine (NORVASC) 10 MG tablet, TAKE ONE TABLET BY MOUTH EVERY DAY, Disp: 90 tablet, Rfl: 3    atorvastatin (LIPITOR) 80 MG tablet, TAKE ONE TABLET BY MOUTH EVERY DAY, Disp: 90  tablet, Rfl: 3    chlorthalidone (HYGROTON) 25 MG tablet, Take 1 tablet by mouth Daily. (Patient taking differently: Take 0.5 tablets by mouth Daily.), Disp: 90 tablet, Rfl: 1    ezetimibe (ZETIA) 10 MG tablet, TAKE ONE TABLET BY MOUTH EVERY DAY, Disp: 90 tablet, Rfl: 0    hydrALAZINE (APRESOLINE) 50 MG tablet, Take 1 tablet by mouth 2 (Two) Times a Day., Disp: 180 tablet, Rfl: 1    HYDROcodone-acetaminophen (NORCO)  MG per tablet, Take 1 tablet by mouth Every 6 (Six) Hours., Disp: , Rfl: 0    hydrOXYzine (ATARAX) 50 MG tablet, TAKE 1/2 TO 2 TABLETS 30 MINUTES BEFORE BED, Disp: 180 tablet, Rfl: 0    levothyroxine (Synthroid) 25 MCG tablet, Take 1 tablet by mouth Every Morning., Disp: 90 tablet, Rfl: 0    tiZANidine (ZANAFLEX) 4 MG tablet, Take 1 tablet by mouth every night at bedtime., Disp: , Rfl: 5    aspirin 81 MG EC tablet, Take 1 tablet by mouth Daily., Disp: 180 tablet, Rfl: 3    Allergies   Allergen Reactions    Azithromycin Diarrhea    Lisinopril Cough       Family History   Problem Relation Age of Onset    Lung cancer Mother     Heart disease Father     No Known Problems Sister     No Known Problems Brother     No Known Problems Brother        Past Surgical History:   Procedure Laterality Date    COLONOSCOPY      ELBOW ARTHROSCOPY Right 2004    2x    HYSTERECTOMY      WRIST FRACTURE SURGERY         Past Medical History:   Diagnosis Date    Anemia     Cerebrovascular accident (CVA) 3/8/2012    x2    Hyperlipidemia     Hypertension        Social History     Socioeconomic History    Marital status: Single    Number of children: 1   Tobacco Use    Smoking status: Former     Packs/day: 2.00     Years: 35.00     Additional pack years: 0.00     Total pack years: 70.00     Types: Cigarettes     Quit date: 2013     Years since quitting: 10.9     Passive exposure: Past    Smokeless tobacco: Never   Vaping Use    Vaping Use: Never used   Substance and Sexual Activity    Alcohol use: Yes     Comment: social     "Drug use: No    Sexual activity: Not Currently         Procedures click this look      Objective:       Physical Exam    /73 (BP Location: Left arm, Patient Position: Sitting, Cuff Size: Adult)   Pulse 84   Ht 160 cm (63\")   Wt 80.7 kg (178 lb)   LMP  (LMP Unknown)   SpO2 96%   BMI 31.53 kg/m²   The patient is alert, oriented and in no distress.    Vital signs as noted above.    Head and neck revealed no carotid bruits or jugular venous distension.  No thyromegaly or lymphadenopathy is present.    Lungs clear.  No wheezing.  Breath sounds are normal bilaterally.    Heart normal first and second heart sounds.  No murmur..  No pericardial rub is present.  No gallop is present.    Abdomen soft and nontender.  No organomegaly is present.    Extremities revealed good peripheral pulses without any pedal edema.    Skin warm and dry.    Musculoskeletal system is grossly normal.    CNS grossly normal.           Diagnosis Plan   1. Hyperlipidemia, unspecified hyperlipidemia type        2. Primary hypertension        3. Heart murmur        4. History of stroke          LAB RESULTS (LAST 7 DAYS)    CBC        BMP        CMP         BNP        TROPONIN        CoAg        Creatinine Clearance  Estimated Creatinine Clearance: 67.6 mL/min (by C-G formula based on SCr of 0.89 mg/dL).    ABG        Radiology  No radiology results for the last day         Assessment and Plan       Diagnoses and all orders for this visit:    1. Hyperlipidemia, unspecified hyperlipidemia type (Primary)    2. Primary hypertension    3. Heart murmur  Overview:  Grade 2/5 Systollic murmur LSB-echo ordered      4. History of stroke    Other orders  -     aspirin 81 MG EC tablet; Take 1 tablet by mouth Daily.  Dispense: 180 tablet; Refill: 3      Hyperlipidemia  Total cholesterol 173, HDL 53, LDL 97, triglycerides 129  History of previous stroke  Continue statin and zetia    Hypertension  Now controlled  Continue amlodipine, chlorthalidone, and " hydralazine  She wants to stay on the current regimen for now    Murmur  No significant VHD on echo    Atypical chest pain  Resolved    History of stroke  aspirin 81 mg      Diana Alonzo MD

## 2023-12-19 NOTE — PROGRESS NOTES
"Chief Complaint  Sleep Apnea    Subjective          Melodie Carlos presents to [facility] NEUROLOG[facility]  History of Present Illness  SANDY f/u patient states she tried machine using cpap machine but states she was having issues w/ machine she states her machine would run 2 hrs and stop working, patient states she does not want to go through [facility] again  Does not sleep with CPAP     Sleep testing history:    On NPSG at [facility] , 1/30/23 patient had Severe obstructive sleep apnea syndrome with apnea-hypopnea index of 33.8 per sleep hour, minimum SpO2 of 83%  PT WAS NEVER ABLE TO SLEEP WITH THE CPAP SO MACHINE WAS RETURNED TO [facility]  PT HAS TROUBLE WITH INSOMNIA, DOES NOT FALL ASLEEP DURING     [place] Sleepiness Scale:  Sitting and reading 0 WatchingTV 0  Sitting, inactive, in a public place 0  As a passenger in a car for 1 hour w/o a break  0  Lying down to rest in the afternoon  0  Sitting and talking to someone  0  Sitting quietly after a lunch  0  In a car, while stopped for traffic or a light  0  Total 0    SHE HAS HAD ent PROCEDURE AND NOW CAN BREATHE BETTER THROUGH HER NOSE      Review of Systems   Endocrine: Positive for cold intolerance and heat intolerance.   Musculoskeletal:  Positive for back pain, neck pain and neck stiffness.   Psychiatric/Behavioral:  Positive for sleep disturbance.          Objective   Vital Signs:   /85   Pulse 89   Resp 16   Ht 160 cm (63\")   Wt 80.3 kg (177 lb)   BMI 31.35 kg/m²     Physical Exam  Vitals reviewed.   Cardiovascular:      Rate and Rhythm: Normal rate.   Pulmonary:      Effort: Pulmonary effort is normal. No respiratory distress.   Neurological:      General: No focal deficit present.      Mental Status: She is alert and oriented to person, place, and time.   Psychiatric:         Mood and Affect: Mood normal.        Result Review :                 Assessment and Plan    Diagnoses and all orders for this visit:    1. Obstructive sleep apnea " syndrome (Primary)      SEVERE SANDY, DID NOT DO WELL WITH AUTO CPAP  Will schedule in lab titration study then obtain pap as indicated       Follow Up   Return for Follow Up visit 30 to 90 days after obtaining PAP.    Patient was given instructions and counseling regarding her condition or for health maintenance advice. Please see specific information pulled into the AVS if appropriate.       This document has been electronically signed by Joseph Seipel, MD on December 20, 2023 09:13 EST

## 2023-12-20 ENCOUNTER — OFFICE VISIT (OUTPATIENT)
Dept: NEUROLOGY | Facility: CLINIC | Age: 58
End: 2023-12-20
Payer: MEDICARE

## 2023-12-20 VITALS
HEART RATE: 89 BPM | BODY MASS INDEX: 31.36 KG/M2 | SYSTOLIC BLOOD PRESSURE: 138 MMHG | WEIGHT: 177 LBS | DIASTOLIC BLOOD PRESSURE: 85 MMHG | RESPIRATION RATE: 16 BRPM | HEIGHT: 63 IN

## 2023-12-20 DIAGNOSIS — G47.33 OBSTRUCTIVE SLEEP APNEA SYNDROME: Primary | ICD-10-CM

## 2023-12-20 PROCEDURE — 3075F SYST BP GE 130 - 139MM HG: CPT | Performed by: PSYCHIATRY & NEUROLOGY

## 2023-12-20 PROCEDURE — 1159F MED LIST DOCD IN RCRD: CPT | Performed by: PSYCHIATRY & NEUROLOGY

## 2023-12-20 PROCEDURE — 3079F DIAST BP 80-89 MM HG: CPT | Performed by: PSYCHIATRY & NEUROLOGY

## 2023-12-20 PROCEDURE — 99213 OFFICE O/P EST LOW 20 MIN: CPT | Performed by: PSYCHIATRY & NEUROLOGY

## 2023-12-20 PROCEDURE — 1160F RVW MEDS BY RX/DR IN RCRD: CPT | Performed by: PSYCHIATRY & NEUROLOGY

## 2024-01-05 RX ORDER — CHLORTHALIDONE 25 MG/1
25 TABLET ORAL DAILY
Qty: 90 TABLET | Refills: 1 | Status: SHIPPED | OUTPATIENT
Start: 2024-01-05

## 2024-01-05 NOTE — TELEPHONE ENCOUNTER
Rx Refill Note  Requested Prescriptions     Signed Prescriptions Disp Refills    chlorthalidone (HYGROTON) 25 MG tablet 90 tablet 1     Sig: TAKE ONE TABLET BY MOUTH DAILY     Authorizing Provider: CONI CHRISTIANSON     Ordering User: ANTONIO HAMMONDS      Last office visit with prescribing clinician: 12/14/2023   Last telemedicine visit with prescribing clinician: Visit date not found   Next office visit with prescribing clinician: 12/19/2024                             Antonio Hammonds MA  01/05/24, 12:03 EST

## 2024-02-07 ENCOUNTER — HOSPITAL ENCOUNTER (OUTPATIENT)
Dept: SLEEP MEDICINE | Facility: HOSPITAL | Age: 59
Discharge: HOME | End: 2024-02-07
Admitting: PSYCHIATRY & NEUROLOGY
Payer: MEDICARE

## 2024-02-07 VITALS — BODY MASS INDEX: 31.37 KG/M2 | HEIGHT: 63 IN | WEIGHT: 177.03 LBS

## 2024-02-07 DIAGNOSIS — G47.33 OBSTRUCTIVE SLEEP APNEA SYNDROME: ICD-10-CM

## 2024-02-07 PROCEDURE — 95811 POLYSOM 6/>YRS CPAP 4/> PARM: CPT | Performed by: PSYCHIATRY & NEUROLOGY

## 2024-02-07 PROCEDURE — 95811 POLYSOM 6/>YRS CPAP 4/> PARM: CPT

## 2024-02-28 ENCOUNTER — TELEPHONE (OUTPATIENT)
Dept: NEUROLOGY | Facility: CLINIC | Age: 59
End: 2024-02-28
Payer: MEDICARE

## 2024-02-28 DIAGNOSIS — G47.33 OBSTRUCTIVE SLEEP APNEA: Primary | ICD-10-CM

## 2024-02-28 NOTE — TELEPHONE ENCOUNTER
----- Message from Joseph F Seipel, MD sent at 2/12/2024  7:57 PM EST -----  Regarding: sleep  Auto CPAP minimum 7 maximum 10    She tried CPAP in the past ask her if she feels she needs Ambien to allow her to adjust to CPAP.

## 2024-02-28 NOTE — TELEPHONE ENCOUNTER
Pt aware of results and orders.  She would like Ambien if needed. [facility] please.     Patient/Caregiver provided printed discharge information.

## 2024-03-04 ENCOUNTER — TELEPHONE (OUTPATIENT)
Dept: NEUROLOGY | Facility: CLINIC | Age: 59
End: 2024-03-04
Payer: MEDICARE

## 2024-03-04 NOTE — TELEPHONE ENCOUNTER
Provider: DR SEIPEL    Caller: MARIAM WITH [facility] SOLANO    Phone Number: [phone] EXT [phone]    FAX# [phone]    Reason for Call: CALLED TO REQUEST THAT THE ORIGINAL SLEEP STUDY BE FAXED OVER. STATES THEY GOT THE TITRATION BUT NEED THE DIAGNOSTIC. PLEASE REVIEW AND ADVISE, THANK YOU.

## 2024-03-05 RX ORDER — HYDRALAZINE HYDROCHLORIDE 50 MG/1
50 TABLET, FILM COATED ORAL 2 TIMES DAILY
Qty: 180 TABLET | Refills: 0 | Status: SHIPPED | OUTPATIENT
Start: 2024-03-05

## 2024-03-05 RX ORDER — EZETIMIBE 10 MG/1
10 TABLET ORAL DAILY
Qty: 90 TABLET | Refills: 0 | Status: SHIPPED | OUTPATIENT
Start: 2024-03-05

## 2024-03-11 RX ORDER — LEVOTHYROXINE SODIUM 0.03 MG/1
25 TABLET ORAL
Qty: 90 TABLET | Refills: 0 | Status: SHIPPED | OUTPATIENT
Start: 2024-03-11

## 2024-03-11 NOTE — TELEPHONE ENCOUNTER
Caller: Melodie Carlos    Relationship: Self    Best call back number: [phone]     Requested Prescriptions:   Requested Prescriptions     Pending Prescriptions Disp Refills    levothyroxine (Synthroid) 25 MCG tablet 90 tablet 0     Sig: Take 1 tablet by mouth Every Morning.        Pharmacy where request should be sent: [address], [facility] - [phone] [facility] [phone] FX     Last office visit with prescribing clinician: 12/4/2023   Last telemedicine visit with prescribing clinician: Visit date not found   Next office visit with prescribing clinician: 6/4/2024     Does the patient have less than a 3 day supply:  [] Yes  [x] No

## 2024-03-18 ENCOUNTER — TELEPHONE (OUTPATIENT)
Dept: NEUROLOGY | Facility: CLINIC | Age: 59
End: 2024-03-18
Payer: MEDICARE

## 2024-03-18 NOTE — TELEPHONE ENCOUNTER
Caller: Melodie Carlos    Relationship to patient: Self    Best call back number: [phone]    Chief complaint: SLEEP COMPLIANCE VISIT    Type of visit: SLEEP FOLLOW UP    Requested date: BETWEEN 4/15-6/15    Additional notes: PATIENT GOT NEW CPAP AND STARTED USING IT ON 3/15 AND NEEDS A COMPLIANCE VISIT SCHEDULED BETWEEN 30-90 DAYS AND WE DON'T HAVE ANYTHING IN THAT TIMEFRAME AVAILABLE.

## 2024-05-09 ENCOUNTER — HOSPITAL ENCOUNTER (OUTPATIENT)
Dept: GENERAL RADIOLOGY | Facility: HOSPITAL | Age: 59
Discharge: HOME | End: 2024-05-09
Payer: MEDICARE

## 2024-05-09 ENCOUNTER — LAB (OUTPATIENT)
Dept: LAB | Facility: HOSPITAL | Age: 59
End: 2024-05-09
Payer: MEDICARE

## 2024-05-09 ENCOUNTER — TRANSCRIBE ORDERS (OUTPATIENT)
Dept: LAB | Facility: HOSPITAL | Age: 59
End: 2024-05-09
Payer: MEDICARE

## 2024-05-09 ENCOUNTER — HOSPITAL ENCOUNTER (OUTPATIENT)
Dept: CARDIOLOGY | Facility: HOSPITAL | Age: 59
Discharge: HOME | End: 2024-05-09
Payer: MEDICARE

## 2024-05-09 DIAGNOSIS — I10 ESSENTIAL HYPERTENSION, MALIGNANT: ICD-10-CM

## 2024-05-09 DIAGNOSIS — I10 ESSENTIAL HYPERTENSION, MALIGNANT: Primary | ICD-10-CM

## 2024-05-09 LAB
ANION GAP SERPL CALCULATED.3IONS-SCNC: 11 MMOL/L (ref 5–15)
BASOPHILS # BLD AUTO: 0.06 10*3/MM3 (ref 0–0.2)
BASOPHILS NFR BLD AUTO: 0.7 % (ref 0–1.5)
BUN SERPL-MCNC: 19 MG/DL (ref 8–23)
BUN/CREAT SERPL: 24.4 (ref 7–25)
CALCIUM SPEC-SCNC: 9.6 MG/DL (ref 8.6–10.5)
CHLORIDE SERPL-SCNC: 97 MMOL/L (ref 98–107)
CO2 SERPL-SCNC: 28 MMOL/L (ref 22–29)
CREAT SERPL-MCNC: 0.78 MG/DL (ref 0.57–1)
DEPRECATED RDW RBC AUTO: 48.6 FL (ref 37–54)
EGFRCR SERPLBLD CKD-EPI 2021: 86.5 ML/MIN/1.73
EOSINOPHIL # BLD AUTO: 0.56 10*3/MM3 (ref 0–0.4)
EOSINOPHIL NFR BLD AUTO: 6.9 % (ref 0.3–6.2)
ERYTHROCYTE [DISTWIDTH] IN BLOOD BY AUTOMATED COUNT: 14.5 % (ref 12.3–15.4)
GLUCOSE SERPL-MCNC: 83 MG/DL (ref 65–99)
HCT VFR BLD AUTO: 39.4 % (ref 34–46.6)
HGB BLD-MCNC: 12.7 G/DL (ref 12–15.9)
IMM GRANULOCYTES # BLD AUTO: 0.02 10*3/MM3 (ref 0–0.05)
IMM GRANULOCYTES NFR BLD AUTO: 0.2 % (ref 0–0.5)
LYMPHOCYTES # BLD AUTO: 2.05 10*3/MM3 (ref 0.7–3.1)
LYMPHOCYTES NFR BLD AUTO: 25.2 % (ref 19.6–45.3)
MCH RBC QN AUTO: 29.1 PG (ref 26.6–33)
MCHC RBC AUTO-ENTMCNC: 32.2 G/DL (ref 31.5–35.7)
MCV RBC AUTO: 90.4 FL (ref 79–97)
MONOCYTES # BLD AUTO: 0.65 10*3/MM3 (ref 0.1–0.9)
MONOCYTES NFR BLD AUTO: 8 % (ref 5–12)
NEUTROPHILS NFR BLD AUTO: 4.8 10*3/MM3 (ref 1.7–7)
NEUTROPHILS NFR BLD AUTO: 59 % (ref 42.7–76)
NRBC BLD AUTO-RTO: 0 /100 WBC (ref 0–0.2)
PLATELET # BLD AUTO: 311 10*3/MM3 (ref 140–450)
PMV BLD AUTO: 12.5 FL (ref 6–12)
POTASSIUM SERPL-SCNC: 3.7 MMOL/L (ref 3.5–5.2)
QT INTERVAL: 388 MS
QTC INTERVAL: 412 MS
RBC # BLD AUTO: 4.36 10*6/MM3 (ref 3.77–5.28)
SODIUM SERPL-SCNC: 136 MMOL/L (ref 136–145)
WBC NRBC COR # BLD AUTO: 8.14 10*3/MM3 (ref 3.4–10.8)

## 2024-05-09 PROCEDURE — 71046 X-RAY EXAM CHEST 2 VIEWS: CPT

## 2024-05-09 PROCEDURE — 85025 COMPLETE CBC W/AUTO DIFF WBC: CPT

## 2024-05-09 PROCEDURE — 36415 COLL VENOUS BLD VENIPUNCTURE: CPT

## 2024-05-09 PROCEDURE — 80048 BASIC METABOLIC PNL TOTAL CA: CPT

## 2024-05-09 PROCEDURE — 93005 ELECTROCARDIOGRAM TRACING: CPT | Performed by: PODIATRIST

## 2024-06-01 RX ORDER — LEVOTHYROXINE SODIUM 0.03 MG/1
25 TABLET ORAL
Qty: 90 TABLET | Refills: 1 | Status: SHIPPED | OUTPATIENT
Start: 2024-06-01

## 2024-06-04 ENCOUNTER — OFFICE VISIT (OUTPATIENT)
Dept: FAMILY MEDICINE CLINIC | Facility: CLINIC | Age: 59
End: 2024-06-04
Payer: MEDICARE

## 2024-06-04 VITALS
TEMPERATURE: 97.5 F | HEIGHT: 63 IN | HEART RATE: 82 BPM | OXYGEN SATURATION: 98 % | SYSTOLIC BLOOD PRESSURE: 112 MMHG | DIASTOLIC BLOOD PRESSURE: 64 MMHG | WEIGHT: 178 LBS | BODY MASS INDEX: 31.54 KG/M2

## 2024-06-04 DIAGNOSIS — R73.09 ELEVATED HEMOGLOBIN A1C: ICD-10-CM

## 2024-06-04 DIAGNOSIS — E66.09 CLASS 1 OBESITY DUE TO EXCESS CALORIES WITH SERIOUS COMORBIDITY AND BODY MASS INDEX (BMI) OF 31.0 TO 31.9 IN ADULT: ICD-10-CM

## 2024-06-04 DIAGNOSIS — E07.9 THYROID DISEASE: ICD-10-CM

## 2024-06-04 DIAGNOSIS — Z12.2 SCREENING FOR LUNG CANCER: ICD-10-CM

## 2024-06-04 DIAGNOSIS — E78.5 HYPERLIPIDEMIA, UNSPECIFIED HYPERLIPIDEMIA TYPE: Primary | ICD-10-CM

## 2024-06-04 DIAGNOSIS — E03.9 ACQUIRED HYPOTHYROIDISM: ICD-10-CM

## 2024-06-04 DIAGNOSIS — Z12.31 OTHER SCREENING MAMMOGRAM: ICD-10-CM

## 2024-06-04 PROCEDURE — 3074F SYST BP LT 130 MM HG: CPT | Performed by: NURSE PRACTITIONER

## 2024-06-04 PROCEDURE — 99214 OFFICE O/P EST MOD 30 MIN: CPT | Performed by: NURSE PRACTITIONER

## 2024-06-04 PROCEDURE — 1125F AMNT PAIN NOTED PAIN PRSNT: CPT | Performed by: NURSE PRACTITIONER

## 2024-06-04 PROCEDURE — 3078F DIAST BP <80 MM HG: CPT | Performed by: NURSE PRACTITIONER

## 2024-06-04 NOTE — PROGRESS NOTES
Melodie Carlos is a 59 y.o. female.     History of Present Illness  59-year-old white female who quit smoking with history of hypertension, hyperlipidemia, sleep apnea, chronic back pain who goes to pain management, CVA x 2 who comes in today for 6-month follow-up visit and fasting blood work    Blood pressure 112/64 heart rate 82 she denies any chest pain, dyspnea, tachycardia or dizziness.    Patient just had surgery on left foot for plantar fasciitis.  There were was a suture removed on the outer aspect of the heel and she thinks there is a suture in the left inner part of the heel.  It is heavily scabbed over cannot really see a suture.  I advised her to call podiatry and ask if there was a suture placed or even better go by his office and let them look at it    She has been complaining of some puffiness in her legs that usually goes down in the morning and she said that she never does prop her legs up even after surgery on her foot and I encouraged her to do so and we did discussion on hidden sodium and different types of food    On last blood work thyroid was elevated at 5.27 I started her on a low-dose Synthroid will be rechecking that today.  A1c 5.9 blood sugar 88.  Patient also is due her CT low-dose lung cancer screening will order that and make sure it is covered.    Weight is 178 with a BMI of 31.5.  She has had 2 COVID vaccines up-to-date on eye exam.  Currently her OB/GYN ordered a mammogram but she is not sure when it was done she is going to find out if not I put an order in for her to get [facility] done at [facility].  Her DEXA scan is due in September 2024 and she is up-to-date on Pap smears.  Next colonoscopy is due in 2032       The following portions of the patient's history were reviewed and updated as appropriate: allergies, current medications, past family history, past medical history, past social history, past surgical history, and problem list.    Vitals:    06/04/24 0829   BP:  "112/64   BP Location: Right arm   Patient Position: Sitting   Cuff Size: Adult   Pulse: 82   Temp: 97.5 °F (36.4 °C)   TempSrc: Infrared   SpO2: 98%   Weight: 80.7 kg (178 lb)   Height: 160 cm (62.99\")     Body mass index is 31.54 kg/m².    Past Medical History:   Diagnosis Date    Anemia     Cerebrovascular accident (CVA) 3/8/2012    x2    Hyperlipidemia     Hypertension      Past Surgical History:   Procedure Laterality Date    COLONOSCOPY      ELBOW ARTHROSCOPY Right 2004    2x    HYSTERECTOMY      WRIST FRACTURE SURGERY       Family History   Problem Relation Age of Onset    Lung cancer Mother     Heart disease Father     No Known Problems Sister     No Known Problems Brother     No Known Problems Brother      Immunization History   Administered Date(s) Administered    COVID-19 (MODERNA) 1st,2nd,3rd Dose Monovalent 03/24/2021, 04/22/2021    Tdap 09/14/2016       Hospital Outpatient Visit on 05/09/2024   Component Date Value Ref Range Status    QT Interval 05/09/2024 388  ms Final    QTC Interval 05/09/2024 412  ms Final         Review of Systems   Constitutional: Negative.    HENT: Negative.     Respiratory: Negative.     Cardiovascular: Negative.    Gastrointestinal: Negative.    Genitourinary: Negative.    Musculoskeletal: Negative.    Skin:         Possible stitch in left foot   Neurological: Negative.    Psychiatric/Behavioral: Negative.         Objective   Physical Exam  Constitutional:       Appearance: Normal appearance.   HENT:      Head: Normocephalic.   Cardiovascular:      Rate and Rhythm: Normal rate and regular rhythm.      Pulses: Normal pulses.      Heart sounds: Normal heart sounds.   Pulmonary:      Effort: Pulmonary effort is normal.      Breath sounds: Normal breath sounds.   Abdominal:      General: Bowel sounds are normal.   Musculoskeletal:         General: Normal range of motion.   Skin:     Capillary Refill: Capillary refill takes less than 2 seconds.      Comments: Scabbed area on left " inner heel unable to visualize the suture   Neurological:      General: No focal deficit present.      Mental Status: She is alert and oriented to person, place, and time.   Psychiatric:         Mood and Affect: Mood normal.         Behavior: Behavior normal.         Procedures    Assessment & Plan   Diagnoses and all orders for this visit:    1. Hyperlipidemia, unspecified hyperlipidemia type (Primary)  -     Lipid Panel With LDL / HDL Ratio    2. Elevated hemoglobin A1c  -     Hemoglobin A1c    3. Acquired hypothyroidism  -     TSH+Free T4  -     T3    4. Screening for lung cancer  -     CT Chest Low Dose Follow Up Without Contrast; Future    5. Other screening mammogram  -     Mammo Screening Digital Tomosynthesis Bilateral With CAD; Future    6. Class 1 obesity due to excess calories with serious comorbidity and body mass index (BMI) of 31.0 to 31.9 in adult    7. Thyroid disease           Current Outpatient Medications:     amLODIPine (NORVASC) 10 MG tablet, TAKE ONE TABLET BY MOUTH EVERY DAY, Disp: 90 tablet, Rfl: 3    aspirin 81 MG EC tablet, Take 1 tablet by mouth Daily., Disp: 180 tablet, Rfl: 3    atorvastatin (LIPITOR) 80 MG tablet, TAKE ONE TABLET BY MOUTH EVERY DAY, Disp: 90 tablet, Rfl: 3    chlorthalidone (HYGROTON) 25 MG tablet, TAKE ONE TABLET BY MOUTH DAILY, Disp: 90 tablet, Rfl: 1    ezetimibe (ZETIA) 10 MG tablet, TAKE 1 TABLET BY MOUTH DAILY, Disp: 90 tablet, Rfl: 0    hydrALAZINE (APRESOLINE) 50 MG tablet, TAKE ONE TABLET BY MOUTH TWICE DAILY, Disp: 180 tablet, Rfl: 0    HYDROcodone-acetaminophen (NORCO)  MG per tablet, Take 1 tablet by mouth Every 6 (Six) Hours., Disp: , Rfl: 0    levothyroxine (SYNTHROID, LEVOTHROID) 25 MCG tablet, TAKE 1 TABLET BY MOUTH EVERY MORNING, Disp: 90 tablet, Rfl: 1    tiZANidine (ZANAFLEX) 4 MG tablet, Take 1 tablet by mouth every night at bedtime., Disp: , Rfl: 5           RUBIN Stoddard 6/4/2024 09:31 EDT  This note has been electronically signed

## 2024-06-05 LAB
CHOLEST SERPL-MCNC: 134 MG/DL (ref 100–199)
HBA1C MFR BLD: 5.8 % (ref 4.8–5.6)
HDLC SERPL-MCNC: 58 MG/DL
LDLC SERPL CALC-MCNC: 56 MG/DL (ref 0–99)
LDLC/HDLC SERPL: 1 RATIO (ref 0–3.2)
T3 SERPL-MCNC: 141 NG/DL (ref 71–180)
T4 FREE SERPL-MCNC: 1.25 NG/DL (ref 0.82–1.77)
TRIGL SERPL-MCNC: 110 MG/DL (ref 0–149)
TSH SERPL DL<=0.005 MIU/L-ACNC: 3.53 UIU/ML (ref 0.45–4.5)
VLDLC SERPL CALC-MCNC: 20 MG/DL (ref 5–40)

## 2024-06-25 NOTE — PROGRESS NOTES
Chief Complaint  SANDY    Subjective          Melodie Carlos presents to [facility] NEUROLOGY  History of Present Illness    Melodie Carlos is a 59-year-old female seen in follow-up for insomnia and obstructive sleep apnea.  The patient was seen initially by Dr. Seipel 3/22/2022.  Sleep study revealed severe sleep apnea with an overall AHI of 33.8.  She attempted PAP therapy but was not able to meet compliance due to severe insomnia.  An in-lab titration study was obtained to revisit PAP therapy and recommended a setting 7 to 10 cm H2O.  A new AutoPap was set up 3/15/2024 through [employer] [facility] [employer].    Patient reports that she does well with her PAP therapy but she struggles with insomnia.  She states that she usually falls asleep around 730-8 PM but wakes up around 1 AM.  She is awake until 5 or 6 AM and then may go back to sleep for an hour.  She typically gets 4-5 hours of sleep a night.  She has tried Ambien, hydroxyzine, and melatonin without benefit.    She uses a nasal mask.    Sleep testing history:    On NPSG at [facility] , 1/30/23 patient had Severe obstructive sleep apnea syndrome with apnea-hypopnea index of 33.8 per sleep hour, minimum SpO2 of 83%  PT WAS NEVER ABLE TO SLEEP WITH THE CPAP SO MACHINE WAS RETURNED TO [facility]  PT HAS TROUBLE WITH INSOMNIA, DOES NOT FALL ASLEEP DURING     PAP download [employer] (3/23/2024 - 4/11/2024):  The patient is on CPAP therapy at 7-10 cm/H2O.   Data indicates moderate compliance. With 80% usage for more than 4 hours with an average usage of 4 hours 17 minutes. AHI down to 3.2 .  Average pressures 8.8 centimeters H2O.  Average large leak 5.8 liters per minute.     The patient's hypersomnia has stayed the same       [place] Sleepiness Scale:  Sitting and reading 0 WatchingTV 0  Sitting, inactive, in a public place 0  As a passenger in a car for 1 hour w/o a break  0  Lying down to rest in the afternoon  0  Sitting and talking to someone  0  Sitting  "quietly after a lunch  0  In a car, while stopped for traffic or a light  0  Total 0    Review of Systems   Musculoskeletal:  Positive for back pain.   Psychiatric/Behavioral:  Positive for sleep disturbance.    All other systems reviewed and are negative.     Objective   Vital Signs:   /76   Pulse 80   Ht 160 cm (62.99\")   Wt 80.7 kg (178 lb)   BMI 31.54 kg/m²     Physical Exam   Result Review :                 Assessment and Plan    Diagnoses and all orders for this visit:    1. Obstructive sleep apnea syndrome (Primary)  -     PAP Therapy    2. Other insomnia  Overview:  Awakens  And can't stay asleep X1 year.      3. History of cerebellar stroke    Sophia Kolb is seen today in follow-up for 31 to 90-day compliance visit for CPAP therapy.  The patient was diagnosed with severe obstructive sleep apnea from a sleep study done 1/2023.  The patient was set up with AutoPap 7 to 10 cm H2O through [facility] [employer].  She states that she has not having any difficulty with PAP therapy but continues to have problems with insomnia.  AutoPap is effective in treating her sleep apnea with an overall AHI of 3.2 over the last 103 days.  She met compliance with 80% over 4 hours from 3/23/2024 - 4/21/2024.    We discussed the importance of sleep hygiene and attempting to get 7 to 8 hours of sleep each night.  I encouraged the patient to avoid any screens within her bedroom.  Avoid caffeine after noon and participate in regular physical activity.    We discussed insomnia and I recommended the patient see Dr. Merida sleep psychologist.  The patient is not interested in traveling to [place]    The patient will continue wear PAP therapy over 4 hours every night.  I explained the importance of treating severe sleep apnea in relationship to helping reduce stroke recurrence.  Patient will follow-up in 6 months or sooner if needed for another compliance visit.  She is encouraged to contact the office in the meantime with " questions or concerns.    The patient is compliant with and benefiting from PAP therapy.    I spent 35 minutes caring for Melodie on this date of service. This time includes time spent by me in the following activities:preparing for the visit, reviewing tests, performing a medically appropriate examination and/or evaluation , counseling and educating the patient/family/caregiver, ordering medications, tests, or procedures, and documenting information in the medical record  Follow Up   Return in about 6 months (around 12/28/2024).    Patient was given instructions and counseling regarding her condition or for health maintenance advice. Please see specific information pulled into the AVS if appropriate.       This document has been electronically signed by RUBIN Christina on June 28, 2024 14:02 EDT

## 2024-06-28 ENCOUNTER — OFFICE VISIT (OUTPATIENT)
Dept: NEUROLOGY | Facility: CLINIC | Age: 59
End: 2024-06-28
Payer: MEDICARE

## 2024-06-28 VITALS
SYSTOLIC BLOOD PRESSURE: 127 MMHG | HEART RATE: 80 BPM | WEIGHT: 178 LBS | DIASTOLIC BLOOD PRESSURE: 76 MMHG | HEIGHT: 63 IN | BODY MASS INDEX: 31.54 KG/M2

## 2024-06-28 DIAGNOSIS — G47.09 OTHER INSOMNIA: ICD-10-CM

## 2024-06-28 DIAGNOSIS — G47.33 OBSTRUCTIVE SLEEP APNEA SYNDROME: Primary | ICD-10-CM

## 2024-06-28 DIAGNOSIS — Z86.73 HISTORY OF CEREBELLAR STROKE: ICD-10-CM

## 2024-06-28 RX ORDER — ATORVASTATIN CALCIUM 80 MG/1
TABLET, FILM COATED ORAL
Qty: 90 TABLET | Refills: 3 | Status: SHIPPED | OUTPATIENT
Start: 2024-06-28

## 2024-06-28 RX ORDER — CHLORTHALIDONE 25 MG/1
12.5 TABLET ORAL DAILY
Qty: 90 TABLET | Refills: 3 | Status: SHIPPED | OUTPATIENT
Start: 2024-06-28

## 2024-06-28 RX ORDER — EZETIMIBE 10 MG/1
10 TABLET ORAL DAILY
Qty: 90 TABLET | Refills: 11 | Status: SHIPPED | OUTPATIENT
Start: 2024-06-28

## 2024-06-28 NOTE — TELEPHONE ENCOUNTER
Rx Refill Note  Requested Prescriptions     Pending Prescriptions Disp Refills    atorvastatin (LIPITOR) 80 MG tablet [Pharmacy Med Name: atorvastatin 80 mg tablet] 90 tablet 3     Sig: TAKE ONE TABLET BY MOUTH EVERY DAY    chlorthalidone (HYGROTON) 25 MG tablet 90 tablet 3     Sig: Take 0.5 tablets by mouth Daily.      Last office visit with prescribing clinician: 12/14/2023   Last telemedicine visit with prescribing clinician: Visit date not found   Next office visit with prescribing clinician: 12/19/2024                         Would you like a call back once the refill request has been completed: [] Yes [] No    If the office needs to give you a call back, can they leave a voicemail: [] Yes [] No    Xin Goncalves MA  06/28/24, 10:08 EDT

## 2024-08-27 RX ORDER — LEVOTHYROXINE SODIUM 25 UG/1
25 TABLET ORAL
Qty: 90 TABLET | Refills: 1 | Status: SHIPPED | OUTPATIENT
Start: 2024-08-27

## 2024-09-06 ENCOUNTER — TELEPHONE (OUTPATIENT)
Dept: CARDIOLOGY | Facility: CLINIC | Age: 59
End: 2024-09-06
Payer: MEDICARE

## 2024-09-06 DIAGNOSIS — R60.0 LOWER EXTREMITY EDEMA: Primary | ICD-10-CM

## 2024-09-06 RX ORDER — FUROSEMIDE 20 MG
20 TABLET ORAL DAILY
Qty: 5 TABLET | Refills: 0 | Status: SHIPPED | OUTPATIENT
Start: 2024-09-06 | End: 2024-09-06 | Stop reason: SDUPTHER

## 2024-09-06 RX ORDER — FUROSEMIDE 20 MG
20 TABLET ORAL DAILY
Qty: 5 TABLET | Refills: 0 | Status: SHIPPED | OUTPATIENT
Start: 2024-09-06 | End: 2024-09-11

## 2024-09-06 NOTE — TELEPHONE ENCOUNTER
Caller: Melodie Carlos    Relationship to patient: Self    Best call back number: [phone]    Patient is needing:    PT REPORTS SWELLING IN LEGS AND FEET FOR THE PAST 2 MONTHS. WANTS TO DISCUSS A PLAN.

## 2024-09-06 NOTE — TELEPHONE ENCOUNTER
Tired to call patient back no answer left message to give our office a call.      Per Della Please call patient and let her know I will give her a few days of Lasix to see if it helps with the swelling     Please also schedule an appointment to be evaluated in office with Keiko or Dr. Redd sooner than December appointment to discuss swelling over the last 2 months.     Patient Rx has been sent to her pharmacy

## 2024-09-06 NOTE — TELEPHONE ENCOUNTER
Please call patient and let her know I will give her a few days of Lasix to see if it helps with the swelling    Please also schedule an appointment to be evaluated in office with Keiko or Dr. Redd sooner than December appointment to discuss swelling over the last 2 months

## 2024-09-06 NOTE — TELEPHONE ENCOUNTER
Please cancel refill at [facility]. She uses [facility] in [place].       Made apt 9/30 with Keiko.     She does not need call back.

## 2024-09-18 ENCOUNTER — TELEPHONE (OUTPATIENT)
Dept: FAMILY MEDICINE CLINIC | Facility: CLINIC | Age: 59
End: 2024-09-18

## 2024-09-18 NOTE — TELEPHONE ENCOUNTER
Please call patient and see if she has had her mammogram. If so, please request records. Thank you.

## 2024-09-20 ENCOUNTER — TELEPHONE (OUTPATIENT)
Dept: FAMILY MEDICINE CLINIC | Facility: CLINIC | Age: 59
End: 2024-09-20

## 2024-09-20 NOTE — TELEPHONE ENCOUNTER
Caller: Melodie Carlos    Relationship: Self    Best call back number: [phone]     What is the best time to reach you:  ANY    Who are you requesting to speak with (clinical staff, provider,  specific staff member): CLINICAL STAFF    Do you know the name of the person who called:      What was the call regarding: PATIENT CALLED SHE IS GETTING HER MAMMOGRAM DONE ON MONDAY 9/23/24 AT [facility].      Is it okay if the provider responds through MyChart:

## 2024-09-26 ENCOUNTER — HOSPITAL ENCOUNTER (OUTPATIENT)
Dept: CT IMAGING | Facility: HOSPITAL | Age: 59
Discharge: HOME | End: 2024-09-26
Admitting: NURSE PRACTITIONER
Payer: MEDICARE

## 2024-09-26 DIAGNOSIS — Z12.2 SCREENING FOR LUNG CANCER: ICD-10-CM

## 2024-09-26 PROCEDURE — 71250 CT THORAX DX C-: CPT

## 2024-09-26 RX ORDER — HYDRALAZINE HYDROCHLORIDE 50 MG/1
50 TABLET, FILM COATED ORAL 2 TIMES DAILY
Qty: 180 TABLET | Refills: 0 | Status: SHIPPED | OUTPATIENT
Start: 2024-09-26

## 2024-09-28 NOTE — PROGRESS NOTES
Subjective:     Encounter Date:09/30/2024        Patient ID: Melodie Carlos [id number]     Chief Complaint:  bilateral lower extremity edema    History of Present Illness:  Melodie Carlos is a 59 y.o. female with a history of hypertension, hyperlipidemia, two strokes, SANDY, and hypothyroidism who presents to the office complaining of bilateral lower extremity edema. She is a former patient of Dr. Diana Alonzo. She reports increasing leg swelling for the past 4-5 months. She was given a trial of oral Lasix, which improved her swelling initially. She is also on chlorthalidone 12.5 mg daily. She states the swelling occurs at all hours of the day. She denies any exacerbating factors. She is also complaining of severe pain in both lower legs and feet. She reports some dyspnea on exertion, but denies shortness of breath at rest. She denies any chest pain. She is a former smoker. She denies a history of blood clots or known peripheral vascular disease.       Review of systems:  Review of Systems   Constitutional: Positive for malaise/fatigue.   Cardiovascular:  Positive for leg swelling. Negative for chest pain, dyspnea on exertion and palpitations.   Respiratory:  Negative for cough and shortness of breath.    Gastrointestinal:  Negative for abdominal pain, nausea and vomiting.   Neurological:  Positive for numbness. Negative for dizziness, focal weakness, headaches and light-headedness.   All other systems reviewed and are negative.        The following portions of the patient's history were reviewed and updated as appropriate: allergies, current medications, past family history, past medical history, past social history, past surgical history and problem list.    Past Medical History:  Past Medical History:   Diagnosis Date    Anemia     Cerebrovascular accident (CVA) 3/8/2012    x2    Hyperlipidemia     Hypertension        Past Surgical History:  Past Surgical History:   Procedure Laterality Date     COLONOSCOPY      ELBOW ARTHROSCOPY Right 2004    2x    HYSTERECTOMY      WRIST FRACTURE SURGERY          Allergies:  Allergies   Allergen Reactions    Azithromycin Diarrhea    Lisinopril Cough       Current Meds:     Current Outpatient Medications:     amLODIPine (NORVASC) 10 MG tablet, TAKE ONE TABLET BY MOUTH EVERY DAY, Disp: 90 tablet, Rfl: 3    atorvastatin (LIPITOR) 80 MG tablet, TAKE ONE TABLET BY MOUTH EVERY DAY, Disp: 90 tablet, Rfl: 3    chlorthalidone (HYGROTON) 25 MG tablet, Take 1 tablet by mouth Daily., Disp: 90 tablet, Rfl: 3    ezetimibe (ZETIA) 10 MG tablet, TAKE 1 TABLET BY MOUTH DAILY, Disp: 90 tablet, Rfl: 11    hydrALAZINE (APRESOLINE) 50 MG tablet, TAKE 1 TABLET BY MOUTH TWICE DAILY, Disp: 180 tablet, Rfl: 0    HYDROcodone-acetaminophen (NORCO)  MG per tablet, Take 1 tablet by mouth Every 6 (Six) Hours., Disp: , Rfl: 0    levothyroxine (SYNTHROID, LEVOTHROID) 25 MCG tablet, TAKE 1 TABLET BY MOUTH EVERY MORNING, Disp: 90 tablet, Rfl: 1    tiZANidine (ZANAFLEX) 4 MG tablet, Take 1 tablet by mouth every night at bedtime., Disp: , Rfl: 5    aspirin 81 MG EC tablet, Take 1 tablet by mouth Daily., Disp: 90 tablet, Rfl: 3    diclofenac (VOLTAREN) 75 MG EC tablet, Take 1 tablet by mouth 2 (Two) Times a Day., Disp: , Rfl:     Social History:   Social History     Tobacco Use    Smoking status: Former     Current packs/day: 0.00     Average packs/day: 2.0 packs/day for 35.0 years (70.0 ttl pk-yrs)     Types: Cigarettes     Start date:      Quit date: 2013     Years since quittin.7     Passive exposure: Past    Smokeless tobacco: Never   Substance Use Topics    Alcohol use: Yes     Comment: social        Family History:  Family History   Problem Relation Age of Onset    Lung cancer Mother     Heart disease Father     No Known Problems Sister     No Known Problems Brother     No Known Problems Brother                  Objective:       Physical Exam:  Vitals reviewed.   Constitutional:        "Appearance: Well-developed and well-groomed. Obese.   Eyes:      Conjunctiva/sclera: Conjunctivae normal.      Pupils: Pupils are equal, round, and reactive to light.   HENT:      Head: Normocephalic and atraumatic.    Mouth/Throat:      Mouth: Mucous membranes are moist.      Pharynx: Oropharynx is clear.   Pulmonary:      Effort: Pulmonary effort is normal.      Breath sounds: Normal breath sounds.   Cardiovascular:      PMI at left midclavicular line. Normal rate. Regular rhythm.   Pulses:     Intact distal pulses.   Edema:     Pretibial: bilateral 1+ pitting edema of the pretibial area.     Ankle: bilateral 1+ pitting edema of the ankle.     Feet: bilateral 1+ edema of the feet.  Abdominal:      General: Bowel sounds are normal.      Palpations: Abdomen is soft.   Musculoskeletal: Normal range of motion.      Cervical back: Normal range of motion. Skin:     General: Skin is warm and dry.   Neurological:      Mental Status: Alert and oriented to person, place, and time.   Psychiatric:         Mood and Affect: Mood normal.         Behavior: Behavior normal.         Vital signs:  /72 (BP Location: Left arm, Patient Position: Sitting, Cuff Size: Adult)   Pulse 80   Ht 157.5 cm (62\")   Wt 76.2 kg (168 lb)   LMP  (LMP Unknown)   SpO2 95%   BMI 30.73 kg/m²       Recent labs reviewed:    CBC        BMP        CMP       Creatinine Clearance  CrCl cannot be calculated (Patient's most recent lab result is older than the maximum 30 days allowed.).    BNP        TROPONIN        CoAg          Cardiology results reviewed:      Echocardiogram  Results for orders placed during the hospital encounter of 09/09/22    Adult Transthoracic Echo Complete W/ Cont if Necessary Per Protocol    Interpretation Summary  · Left ventricular wall thickness is consistent with mild concentric hypertrophy.  · Estimated left ventricular EF = 60% Estimated left ventricular EF was in agreement with the calculated left ventricular EF. Left " ventricular systolic function is normal.  · Mild dilation of the ascending aorta is present.  · Ascending aorta measures 3.8 cm  · Estimated right ventricular systolic pressure from tricuspid regurgitation is normal (<35 mmHg).  · Left ventricular diastolic dysfunction is noted.      Cardiac catheterization  No results found for this or any previous visit.               Assessment:         Diagnoses and all orders for this visit:    1. Bilateral lower extremity edema (Primary)  -     Adult Transthoracic Echo Complete W/ Cont if Necessary Per Protocol; Future  -     chlorthalidone (HYGROTON) 25 MG tablet; Take 1 tablet by mouth Daily.  Dispense: 90 tablet; Refill: 3    2. Primary hypertension    3. Hyperlipidemia, unspecified hyperlipidemia type    4. History of cerebellar stroke    5. Hypothyroidism, unspecified type    6. Class 1 obesity due to excess calories with serious comorbidity and body mass index (BMI) of 31.0 to 31.9 in adult    7. Obstructive sleep apnea syndrome    8. Rest pain of both lower extremities due to atherosclerosis  -     Doppler Ankle Brachial Index Single Level CAR; Future    Other orders  -     aspirin 81 MG EC tablet; Take 1 tablet by mouth Daily.  Dispense: 90 tablet; Refill: 3                Plan:       Bilateral lower extremity edema  Etiology unclear  Increase chlorthalidone to 25 mg daily  Patient encouraged to weigh herself daily and keep a log.  She will notify us if her swelling does not improve with increased diuretic dose.  Consider switching to Lasix 40 mg daily if no improvement.   Obtain echocardiogram to evaluate LV function     Primary hypertension, chronic  Blood pressure is controlled  Continue amlodipine, chlorthalidone, and hydralazine     Hyperlipidemia, unspecified hyperlipidemia type  Last LDL 56  Continue atorvastatin and Zetia  Goal LDL <70    History of cerebellar stroke  Continue high intensity statin  Restart aspirin 81 mg daily     Hypothyroidism, unspecified  type  On levothyroxine     Class 1 obesity due to excess calories with serious comorbidity   BMI 30.73  Lifestyle modifications recommended    Obstructive sleep apnea syndrome  Compliance with CPAP recommended     Rest pain of both lower extremities due to atherosclerosis  Obtain doppler ankle brachial index  Consider ambulatory referral to vascular surgery       Electronically signed by RUBIN Graff, 10/01/24, 11:12 AM EDT.

## 2024-09-30 ENCOUNTER — OFFICE VISIT (OUTPATIENT)
Dept: CARDIOLOGY | Facility: CLINIC | Age: 59
End: 2024-09-30
Payer: MEDICARE

## 2024-09-30 VITALS
SYSTOLIC BLOOD PRESSURE: 127 MMHG | HEIGHT: 62 IN | HEART RATE: 80 BPM | BODY MASS INDEX: 30.91 KG/M2 | WEIGHT: 168 LBS | DIASTOLIC BLOOD PRESSURE: 72 MMHG | OXYGEN SATURATION: 95 %

## 2024-09-30 DIAGNOSIS — E66.09 CLASS 1 OBESITY DUE TO EXCESS CALORIES WITH SERIOUS COMORBIDITY AND BODY MASS INDEX (BMI) OF 31.0 TO 31.9 IN ADULT: Chronic | ICD-10-CM

## 2024-09-30 DIAGNOSIS — I70.223 REST PAIN OF BOTH LOWER EXTREMITIES DUE TO ATHEROSCLEROSIS: ICD-10-CM

## 2024-09-30 DIAGNOSIS — I10 PRIMARY HYPERTENSION: Chronic | ICD-10-CM

## 2024-09-30 DIAGNOSIS — Z86.73 HISTORY OF CEREBELLAR STROKE: Chronic | ICD-10-CM

## 2024-09-30 DIAGNOSIS — E66.811 CLASS 1 OBESITY DUE TO EXCESS CALORIES WITH SERIOUS COMORBIDITY AND BODY MASS INDEX (BMI) OF 31.0 TO 31.9 IN ADULT: Chronic | ICD-10-CM

## 2024-09-30 DIAGNOSIS — E03.9 HYPOTHYROIDISM, UNSPECIFIED TYPE: Chronic | ICD-10-CM

## 2024-09-30 DIAGNOSIS — E78.5 HYPERLIPIDEMIA, UNSPECIFIED HYPERLIPIDEMIA TYPE: Chronic | ICD-10-CM

## 2024-09-30 DIAGNOSIS — R60.0 BILATERAL LOWER EXTREMITY EDEMA: Primary | ICD-10-CM

## 2024-09-30 DIAGNOSIS — G47.33 OBSTRUCTIVE SLEEP APNEA SYNDROME: Chronic | ICD-10-CM

## 2024-09-30 PROCEDURE — 3074F SYST BP LT 130 MM HG: CPT | Performed by: NURSE PRACTITIONER

## 2024-09-30 PROCEDURE — 1159F MED LIST DOCD IN RCRD: CPT | Performed by: NURSE PRACTITIONER

## 2024-09-30 PROCEDURE — 99214 OFFICE O/P EST MOD 30 MIN: CPT | Performed by: NURSE PRACTITIONER

## 2024-09-30 PROCEDURE — 1160F RVW MEDS BY RX/DR IN RCRD: CPT | Performed by: NURSE PRACTITIONER

## 2024-09-30 PROCEDURE — 3078F DIAST BP <80 MM HG: CPT | Performed by: NURSE PRACTITIONER

## 2024-09-30 RX ORDER — CHLORTHALIDONE 25 MG/1
25 TABLET ORAL DAILY
Qty: 90 TABLET | Refills: 3 | Status: SHIPPED | OUTPATIENT
Start: 2024-09-30

## 2024-09-30 RX ORDER — ASPIRIN 81 MG/1
81 TABLET ORAL DAILY
Qty: 90 TABLET | Refills: 3 | Status: SHIPPED | OUTPATIENT
Start: 2024-09-30

## 2024-09-30 RX ORDER — DICLOFENAC SODIUM 75 MG/1
75 TABLET, DELAYED RELEASE ORAL 2 TIMES DAILY
COMMUNITY

## 2024-10-01 ENCOUNTER — TELEPHONE (OUTPATIENT)
Dept: CARDIOLOGY | Facility: CLINIC | Age: 59
End: 2024-10-01
Payer: MEDICARE

## 2024-10-01 NOTE — TELEPHONE ENCOUNTER
HUB UNABLE TO WARM TRANSFER    Caller: Melodie Carlos    Relationship to patient: Self    Best call back number:  [phone]    Patient is needing: PATIENT REQUESTING A CALL BACK TO SCHEDULE ECHO

## 2024-10-03 ENCOUNTER — HOSPITAL ENCOUNTER (OUTPATIENT)
Dept: CARDIOLOGY | Facility: HOSPITAL | Age: 59
Discharge: HOME | End: 2024-10-03
Admitting: NURSE PRACTITIONER
Payer: MEDICARE

## 2024-10-03 VITALS — BODY MASS INDEX: 30.91 KG/M2 | WEIGHT: 167.99 LBS | HEIGHT: 62 IN

## 2024-10-03 DIAGNOSIS — R60.0 BILATERAL LOWER EXTREMITY EDEMA: ICD-10-CM

## 2024-10-03 LAB
BH CV ECHO MEAS - ACS: 1.72 CM
BH CV ECHO MEAS - AI P1/2T: 448.8 MSEC
BH CV ECHO MEAS - AO MAX PG: 18.1 MMHG
BH CV ECHO MEAS - AO MEAN PG: 8.7 MMHG
BH CV ECHO MEAS - AO ROOT DIAM: 2.48 CM
BH CV ECHO MEAS - AO V2 MAX: 212.2 CM/SEC
BH CV ECHO MEAS - AO V2 VTI: 40.5 CM
BH CV ECHO MEAS - EDV(CUBED): 114.4 ML
BH CV ECHO MEAS - EDV(MOD-SP4): 58 ML
BH CV ECHO MEAS - EF(MOD-BP): 65 %
BH CV ECHO MEAS - EF(MOD-SP4): 65 %
BH CV ECHO MEAS - ESV(CUBED): 31 ML
BH CV ECHO MEAS - ESV(MOD-SP4): 18.9 ML
BH CV ECHO MEAS - FS: 35.3 %
BH CV ECHO MEAS - IVS/LVPW: 0.99 CM
BH CV ECHO MEAS - IVSD: 0.99 CM
BH CV ECHO MEAS - LA DIMENSION: 3.7 CM
BH CV ECHO MEAS - LV DIASTOLIC VOL/BSA (35-75): 32.7 CM2
BH CV ECHO MEAS - LV MASS(C)D: 173.3 GRAMS
BH CV ECHO MEAS - LV SYSTOLIC VOL/BSA (12-30): 10.7 CM2
BH CV ECHO MEAS - LVIDD: 4.9 CM
BH CV ECHO MEAS - LVIDS: 3.1 CM
BH CV ECHO MEAS - LVOT AREA: 3.1 CM2
BH CV ECHO MEAS - LVOT DIAM: 1.99 CM
BH CV ECHO MEAS - LVPWD: 1.01 CM
BH CV ECHO MEAS - MR MAX PG: 103.2 MMHG
BH CV ECHO MEAS - MR MAX VEL: 507.6 CM/SEC
BH CV ECHO MEAS - MV A MAX VEL: 112.4 CM/SEC
BH CV ECHO MEAS - MV DEC SLOPE: 350.3 CM/SEC2
BH CV ECHO MEAS - MV DEC TIME: 0.23 SEC
BH CV ECHO MEAS - MV E MAX VEL: 82.1 CM/SEC
BH CV ECHO MEAS - MV E/A: 0.73
BH CV ECHO MEAS - MV MAX PG: 6.6 MMHG
BH CV ECHO MEAS - MV MEAN PG: 2.21 MMHG
BH CV ECHO MEAS - MV V2 VTI: 32.5 CM
BH CV ECHO MEAS - PA ACC TIME: 0.11 SEC
BH CV ECHO MEAS - PA V2 MAX: 119 CM/SEC
BH CV ECHO MEAS - PI END-D VEL: 63.3 CM/SEC
BH CV ECHO MEAS - PULM A REVS DUR: 0.1 SEC
BH CV ECHO MEAS - PULM A REVS VEL: 31.9 CM/SEC
BH CV ECHO MEAS - PULM DIAS VEL: 33.1 CM/SEC
BH CV ECHO MEAS - PULM S/D: 1.76
BH CV ECHO MEAS - PULM SYS VEL: 58.4 CM/SEC
BH CV ECHO MEAS - RAP SYSTOLE: 3 MMHG
BH CV ECHO MEAS - RVSP: 38.6 MMHG
BH CV ECHO MEAS - SV(MOD-SP4): 39.1 ML
BH CV ECHO MEAS - SVI(MOD-SP4): 22 ML/M2
BH CV ECHO MEAS - TAPSE (>1.6): 2.8 CM
BH CV ECHO MEAS - TR MAX PG: 35.6 MMHG
BH CV ECHO MEAS - TR MAX VEL: 275 CM/SEC
BH CV ECHO SHUNT ASSESSMENT PERFORMED (HIDDEN SCRIPTING): 1
BH CV XLRA - RV BASE: 3.4 CM
BH CV XLRA - RV MID: 1.8 CM

## 2024-10-03 PROCEDURE — 93306 TTE W/DOPPLER COMPLETE: CPT

## 2024-10-18 ENCOUNTER — OFFICE VISIT (OUTPATIENT)
Dept: FAMILY MEDICINE CLINIC | Facility: CLINIC | Age: 59
End: 2024-10-18
Payer: MEDICARE

## 2024-10-18 VITALS
HEIGHT: 62 IN | OXYGEN SATURATION: 99 % | HEART RATE: 95 BPM | BODY MASS INDEX: 31.1 KG/M2 | TEMPERATURE: 97.1 F | SYSTOLIC BLOOD PRESSURE: 124 MMHG | WEIGHT: 169 LBS | DIASTOLIC BLOOD PRESSURE: 64 MMHG

## 2024-10-18 DIAGNOSIS — Z87.891 FORMER SMOKER: ICD-10-CM

## 2024-10-18 DIAGNOSIS — E66.09 CLASS 1 OBESITY DUE TO EXCESS CALORIES WITH SERIOUS COMORBIDITY AND BODY MASS INDEX (BMI) OF 30.0 TO 30.9 IN ADULT: ICD-10-CM

## 2024-10-18 DIAGNOSIS — E66.811 CLASS 1 OBESITY DUE TO EXCESS CALORIES WITH SERIOUS COMORBIDITY AND BODY MASS INDEX (BMI) OF 30.0 TO 30.9 IN ADULT: ICD-10-CM

## 2024-10-18 DIAGNOSIS — R10.9 RIGHT FLANK PAIN: Primary | ICD-10-CM

## 2024-10-18 DIAGNOSIS — M79.672 LEFT FOOT PAIN: ICD-10-CM

## 2024-10-18 LAB
BILIRUB BLD-MCNC: NEGATIVE MG/DL
CLARITY, POC: CLEAR
COLOR UR: YELLOW
EXPIRATION DATE: ABNORMAL
GLUCOSE UR STRIP-MCNC: NEGATIVE MG/DL
KETONES UR QL: NEGATIVE
LEUKOCYTE EST, POC: NEGATIVE
Lab: ABNORMAL
NITRITE UR-MCNC: NEGATIVE MG/ML
PH UR: 7.5 [PH] (ref 5–8)
PROT UR STRIP-MCNC: NEGATIVE MG/DL
RBC # UR STRIP: ABNORMAL /UL
SP GR UR: 1.01 (ref 1–1.03)
UROBILINOGEN UR QL: NORMAL

## 2024-10-18 PROCEDURE — 3078F DIAST BP <80 MM HG: CPT | Performed by: NURSE PRACTITIONER

## 2024-10-18 PROCEDURE — 3074F SYST BP LT 130 MM HG: CPT | Performed by: NURSE PRACTITIONER

## 2024-10-18 PROCEDURE — 81003 URINALYSIS AUTO W/O SCOPE: CPT | Performed by: NURSE PRACTITIONER

## 2024-10-18 PROCEDURE — 99214 OFFICE O/P EST MOD 30 MIN: CPT | Performed by: NURSE PRACTITIONER

## 2024-10-18 PROCEDURE — 1125F AMNT PAIN NOTED PAIN PRSNT: CPT | Performed by: NURSE PRACTITIONER

## 2024-10-18 RX ORDER — CEPHALEXIN 500 MG/1
500 CAPSULE ORAL 2 TIMES DAILY
Qty: 14 CAPSULE | Refills: 0 | Status: SHIPPED | OUTPATIENT
Start: 2024-10-18

## 2024-10-18 RX ORDER — CYCLOBENZAPRINE HCL 10 MG
10 TABLET ORAL NIGHTLY PRN
Qty: 30 TABLET | Refills: 2 | Status: SHIPPED | OUTPATIENT
Start: 2024-10-18

## 2024-10-18 RX ORDER — CELECOXIB 100 MG/1
100 CAPSULE ORAL 2 TIMES DAILY PRN
Qty: 60 CAPSULE | Refills: 2 | Status: SHIPPED | OUTPATIENT
Start: 2024-10-18

## 2024-10-18 RX ORDER — PREDNISONE 10 MG/1
TABLET ORAL
Qty: 19 TABLET | Refills: 0 | Status: SHIPPED | OUTPATIENT
Start: 2024-10-18 | End: 2024-10-30

## 2024-10-18 NOTE — PROGRESS NOTES
Melodie Carlos is a 59 y.o. female.     History of Present Illness  59-year-old white female who recently quit smoking with history of hypertension, hyperlipidemia, sleep apnea, chronic pain goes to pain management, CVA x 2 who comes in today with complaints of right flank pain    Blood pressure 124/64 heart rate 94 she denies any chest pain, dyspnea, tachycardia or dizziness    Patient's urinalysis just showed blood.  She describes the pain as an 8 or 9 and is a little worse with movement.  I am going to treat her with antibiotics and steroids anti-inflammatories if no improvement by Monday we will probably get a CT stone protocol.  She is not aware of any family history of stones    Patient had recent left foot surgery and the foot still hurts quite a bit ankle still slightly swollen.  She was placed on meloxicam but states it does not do anything she is on hydrocodone already from pain management.  Will switch her over to Celebrex along with Flexeril and steroids for her back    Weight is down 9 pounds with a weight of 169 and a BMI of 30.9.  She has had 2 COVID vaccines up-to-date on eye exam.  Her mammogram and DEXA scan are due in September 2025.  She is up-to-date on Pap smears her next colonoscopy is due in 2032        UA/urine culture  Celebrex 100 mg 1-2 times a day as needed  Prednisone 10 mg taper dose as directed  Flexeril 1-3 times a day monitor for drowsiness  No lifting and heat to back frequently  Keflex 500 mg twice daily x 7 days  If no improvement by Monday call office           The following portions of the patient's history were reviewed and updated as appropriate: allergies, current medications, past family history, past medical history, past social history, past surgical history, and problem list.    Vitals:    10/18/24 0805   BP: 124/64   BP Location: Right arm   Patient Position: Sitting   Cuff Size: Adult   Pulse: 95   Temp: 97.1 °F (36.2 °C)   TempSrc: Oral   SpO2: 99%   Weight:  "76.7 kg (169 lb)   Height: 157.5 cm (62.01\")     Body mass index is 30.9 kg/m².  BMI is >= 30 and <35. (Class 1 Obesity). The following options were offered after discussion;: exercise counseling/recommendations       Past Medical History:   Diagnosis Date    Anemia     Cerebrovascular accident (CVA) 3/8/2012    x2    Hyperlipidemia     Hypertension      Past Surgical History:   Procedure Laterality Date    COLONOSCOPY      ELBOW ARTHROSCOPY Right 2004    2x    HYSTERECTOMY      WRIST FRACTURE SURGERY       Family History   Problem Relation Age of Onset    Lung cancer Mother     Heart disease Father     No Known Problems Sister     No Known Problems Brother     No Known Problems Brother      Immunization History   Administered Date(s) Administered    COVID-19 (MODERNA) 1st,2nd,3rd Dose Monovalent 03/24/2021, 04/22/2021    Tdap 09/14/2016       Hospital Outpatient Visit on 10/03/2024   Component Date Value Ref Range Status    LVIDd 10/03/2024 4.9  cm Final    LVIDs 10/03/2024 3.1  cm Final    IVSd 10/03/2024 0.99  cm Final    LVPWd 10/03/2024 1.01  cm Final    FS 10/03/2024 35.3  % Final    IVS/LVPW 10/03/2024 0.99  cm Final    ESV(cubed) 10/03/2024 31.0  ml Final    LV Sys Vol (BSA corrected) 10/03/2024 10.7  cm2 Final    EDV(cubed) 10/03/2024 114.4  ml Final    LV Evangelista Vol (BSA corrected) 10/03/2024 32.7  cm2 Final    LV mass(C)d 10/03/2024 173.3  grams Final    LVOT area 10/03/2024 3.1  cm2 Final    LVOT diam 10/03/2024 1.99  cm Final    EDV(MOD-sp4) 10/03/2024 58.0  ml Final    ESV(MOD-sp4) 10/03/2024 18.9  ml Final    SV(MOD-sp4) 10/03/2024 39.1  ml Final    SVi(MOD-SP4) 10/03/2024 22.0  ml/m2 Final    EF(MOD-sp4) 10/03/2024 65.0  % Final    MV E max juli 10/03/2024 82.1  cm/sec Final    MV A max juli 10/03/2024 112.4  cm/sec Final    MV dec time 10/03/2024 0.23  sec Final    MV E/A 10/03/2024 0.73   Final    Pulm A Revs Dur 10/03/2024 0.10  sec Final    TR max juli 10/03/2024 275.0  cm/sec Final    LA dimension " (2D)  10/03/2024 3.7  cm Final    Pulm Sys Caio 10/03/2024 58.4  cm/sec Final    Pulm Evangelista Caio 10/03/2024 33.1  cm/sec Final    Pulm S/D 10/03/2024 1.76   Final    Pulm A Revs Caio 10/03/2024 31.9  cm/sec Final    Ao pk caio 10/03/2024 212.2  cm/sec Final    Ao max PG 10/03/2024 18.1  mmHg Final    Ao mean PG 10/03/2024 8.7  mmHg Final    Ao V2 VTI 10/03/2024 40.5  cm Final    AI P1/2t 10/03/2024 448.8  msec Final    MV max PG 10/03/2024 6.6  mmHg Final    MV mean PG 10/03/2024 2.21  mmHg Final    MV V2 VTI 10/03/2024 32.5  cm Final    MV dec slope 10/03/2024 350.3  cm/sec2 Final    MR max caio 10/03/2024 507.6  cm/sec Final    MR max PG 10/03/2024 103.2  mmHg Final    TR max PG 10/03/2024 35.6  mmHg Final    RVSP(TR) 10/03/2024 38.6  mmHg Final    RAP systole 10/03/2024 3.0  mmHg Final    PA V2 max 10/03/2024 119.0  cm/sec Final    PA acc time 10/03/2024 0.11  sec Final    PI end-d caio 10/03/2024 63.3  cm/sec Final    Ao root diam 10/03/2024 2.48  cm Final    ACS 10/03/2024 1.72  cm Final    EF(MOD-bp) 10/03/2024 65.0  % Final    RV Base 10/03/2024 3.40  cm Final    RV Mid 10/03/2024 1.80  cm Final    TAPSE (>1.6) 10/03/2024 2.80  cm Final    BH CV ECHO SHUNT ASSESSMENT PERFOR* 10/03/2024 1   Final         Review of Systems   Constitutional: Negative.    HENT: Negative.     Respiratory: Negative.     Cardiovascular: Negative.    Gastrointestinal: Negative.    Genitourinary:  Positive for flank pain.   Musculoskeletal:         Left foot pain   Skin: Negative.    Neurological: Negative.    Psychiatric/Behavioral: Negative.         Objective   Physical Exam  Constitutional:       Appearance: Normal appearance.   HENT:      Head: Normocephalic.   Cardiovascular:      Rate and Rhythm: Normal rate and regular rhythm.      Pulses: Normal pulses.      Heart sounds: Normal heart sounds.   Pulmonary:      Effort: Pulmonary effort is normal.      Breath sounds: Normal breath sounds.   Abdominal:      General: Bowel sounds are  normal.      Comments: CVA tenderness right flank   Musculoskeletal:         General: Normal range of motion.   Skin:     General: Skin is warm.   Neurological:      General: No focal deficit present.      Mental Status: She is alert and oriented to person, place, and time.   Psychiatric:         Mood and Affect: Mood normal.         Behavior: Behavior normal.         Procedures    Assessment & Plan   Diagnoses and all orders for this visit:    1. Right flank pain (Primary)  -     POC Urinalysis Dipstick, Automated  -     Urine Culture - Urine, Urine, Clean Catch  -     Urine Culture - Urine, Urine, Clean Catch; Future    2. Class 1 obesity due to excess calories with serious comorbidity and body mass index (BMI) of 30.0 to 30.9 in adult    3. Former smoker    4. Left foot pain    Other orders  -     celecoxib (CeleBREX) 100 MG capsule; Take 1 capsule by mouth 2 (Two) Times a Day As Needed for Moderate Pain.  Dispense: 60 capsule; Refill: 2  -     cephalexin (Keflex) 500 MG capsule; Take 1 capsule by mouth 2 (Two) Times a Day.  Dispense: 14 capsule; Refill: 0  -     predniSONE (DELTASONE) 10 MG tablet; Take 3 tablets by mouth Daily for 3 days, THEN 2 tablets Daily for 3 days, THEN 1 tablet Daily for 2 days, THEN 0.5 tablets Daily for 4 days.  Dispense: 19 tablet; Refill: 0  -     cyclobenzaprine (FLEXERIL) 10 MG tablet; Take 1 tablet by mouth At Night As Needed for Muscle Spasms.  Dispense: 30 tablet; Refill: 2           Current Outpatient Medications:     amLODIPine (NORVASC) 10 MG tablet, TAKE ONE TABLET BY MOUTH EVERY DAY, Disp: 90 tablet, Rfl: 3    aspirin 81 MG EC tablet, Take 1 tablet by mouth Daily., Disp: 90 tablet, Rfl: 3    atorvastatin (LIPITOR) 80 MG tablet, TAKE ONE TABLET BY MOUTH EVERY DAY, Disp: 90 tablet, Rfl: 3    chlorthalidone (HYGROTON) 25 MG tablet, Take 1 tablet by mouth Daily., Disp: 90 tablet, Rfl: 3    diclofenac (VOLTAREN) 75 MG EC tablet, Take 1 tablet by mouth 2 (Two) Times a Day., Disp:  , Rfl:     ezetimibe (ZETIA) 10 MG tablet, TAKE 1 TABLET BY MOUTH DAILY, Disp: 90 tablet, Rfl: 11    hydrALAZINE (APRESOLINE) 50 MG tablet, TAKE 1 TABLET BY MOUTH TWICE DAILY, Disp: 180 tablet, Rfl: 0    HYDROcodone-acetaminophen (NORCO)  MG per tablet, Take 1 tablet by mouth Every 6 (Six) Hours., Disp: , Rfl: 0    levothyroxine (SYNTHROID, LEVOTHROID) 25 MCG tablet, TAKE 1 TABLET BY MOUTH EVERY MORNING, Disp: 90 tablet, Rfl: 1    tiZANidine (ZANAFLEX) 4 MG tablet, Take 1 tablet by mouth every night at bedtime., Disp: , Rfl: 5    celecoxib (CeleBREX) 100 MG capsule, Take 1 capsule by mouth 2 (Two) Times a Day As Needed for Moderate Pain., Disp: 60 capsule, Rfl: 2    cephalexin (Keflex) 500 MG capsule, Take 1 capsule by mouth 2 (Two) Times a Day., Disp: 14 capsule, Rfl: 0    cyclobenzaprine (FLEXERIL) 10 MG tablet, Take 1 tablet by mouth At Night As Needed for Muscle Spasms., Disp: 30 tablet, Rfl: 2    predniSONE (DELTASONE) 10 MG tablet, Take 3 tablets by mouth Daily for 3 days, THEN 2 tablets Daily for 3 days, THEN 1 tablet Daily for 2 days, THEN 0.5 tablets Daily for 4 days., Disp: 19 tablet, Rfl: 0           RUBIN Stoddard 10/18/2024 08:57 EDT  This note has been electronically signed

## 2024-10-18 NOTE — PATIENT INSTRUCTIONS
UA/urine culture  Celebrex 100 mg 1-2 times a day as needed  Prednisone 10 mg taper dose as directed  Flexeril 1-3 times a day monitor for drowsiness  No lifting and heat to back frequently  Keflex 500 mg twice daily x 7 days  If no improvement by Monday call office

## 2024-10-20 LAB
BACTERIA UR CULT: NO GROWTH
BACTERIA UR CULT: NORMAL

## 2024-10-21 ENCOUNTER — TELEPHONE (OUTPATIENT)
Dept: FAMILY MEDICINE CLINIC | Facility: CLINIC | Age: 59
End: 2024-10-21
Payer: MEDICARE

## 2024-10-21 DIAGNOSIS — R10.9 ACUTE RIGHT FLANK PAIN: Primary | ICD-10-CM

## 2024-10-21 NOTE — TELEPHONE ENCOUNTER
Melodie called and is still having flank pain, she said that you mention something about a ct scan to check for kidney stones..There is no openings to get her in here to see you. Please advise....

## 2024-10-22 ENCOUNTER — TELEPHONE (OUTPATIENT)
Dept: FAMILY MEDICINE CLINIC | Facility: CLINIC | Age: 59
End: 2024-10-22
Payer: MEDICARE

## 2024-10-22 DIAGNOSIS — E27.9 LESION OF ADRENAL GLAND: Primary | ICD-10-CM

## 2024-10-22 DIAGNOSIS — R10.9 ACUTE RIGHT FLANK PAIN: ICD-10-CM

## 2024-10-22 NOTE — TELEPHONE ENCOUNTER
I called pt left vm to return call. [facility]    [facility]LAY    PER MILADIS~    So CT shows no stones in either kidney, she does have a slightly enlarged heart.     Weight loss and exercise would help with that.      It did show a lesion on the left adrenal gland which sits on top of the left kidney and I think we need to get an MRI to make sure what that is.      Hopefully is just a simple cyst.     I will order the MRI at [place] her insurance probably needs to approve it first

## 2024-10-22 NOTE — TELEPHONE ENCOUNTER
Name: Melodie Carlos REYMUNDO      Relationship: Self      Best Callback Number: [phone]      HUB PROVIDED THE RELAY MESSAGE FROM THE OFFICE      PATIENT: PATIENT WAS READ MESSAGE AND EXPRESSED UNDERSTANDING.

## 2024-11-06 RX ORDER — AMLODIPINE BESYLATE 10 MG/1
10 TABLET ORAL DAILY
Qty: 90 TABLET | Refills: 3 | Status: SHIPPED | OUTPATIENT
Start: 2024-11-06

## 2024-11-20 ENCOUNTER — TELEPHONE (OUTPATIENT)
Dept: FAMILY MEDICINE CLINIC | Facility: CLINIC | Age: 59
End: 2024-11-20
Payer: MEDICARE

## 2024-11-20 DIAGNOSIS — N28.89 RENAL MASS: Primary | ICD-10-CM

## 2024-11-20 NOTE — TELEPHONE ENCOUNTER
Caller: [facility] DEPT    Best call back number: [phone]    What was the call regarding: THE MRI THAT WAS ORDERED NEEDS A PRIOR AUTHORIZATION FROM THE INSURANCE     Is it okay if the provider responds through MyChart: CALL IF NEEDED

## 2024-11-20 NOTE — TELEPHONE ENCOUNTER
[facility] called stating that the MRI of abdomen needs to be with and without contrast.  Please put in a new order

## 2024-11-22 DIAGNOSIS — E27.9 LESION OF ADRENAL GLAND: Primary | ICD-10-CM

## 2024-11-25 DIAGNOSIS — R93.89 ABNORMAL MRI: ICD-10-CM

## 2024-11-25 DIAGNOSIS — E27.8 ADRENAL MASS: Primary | ICD-10-CM

## 2024-11-25 DIAGNOSIS — R93.5 ABNORMAL FINDINGS ON DIAGNOSTIC IMAGING OF OTHER ABDOMINAL REGIONS, INCLUDING RETROPERITONEUM: ICD-10-CM

## 2024-12-12 RX ORDER — CYCLOBENZAPRINE HCL 10 MG
10 TABLET ORAL NIGHTLY PRN
Qty: 30 TABLET | Refills: 2 | Status: SHIPPED | OUTPATIENT
Start: 2024-12-12

## 2024-12-14 NOTE — PROGRESS NOTES
Encounter Date:12/20/2024        Patient ID: Melodie Carlos is a 59 y.o. female.      Chief Complaint:      History of Present Illness  Melodie Carlos is a 59 y.o. female with a history of hypertension, hyperlipidemia, two strokes, SANDY, and hypothyroidism who presents to the office for follow-up.    Current cardiac medications include amlodipine, aspirin, atorvastatin, chlorthalidone, Zetia, hydralazine.    The following portions of the patient's history were reviewed and updated as appropriate: allergies, current medications, past family history, past medical history, past social history, past surgical history, and problem list.    Review of Systems   All other systems reviewed and are negative.        Current Outpatient Medications:     amLODIPine (NORVASC) 10 MG tablet, TAKE 1 TABLET BY MOUTH DAILY, Disp: 90 tablet, Rfl: 3    aspirin 81 MG EC tablet, Take 1 tablet by mouth Daily., Disp: 90 tablet, Rfl: 3    atorvastatin (LIPITOR) 80 MG tablet, TAKE ONE TABLET BY MOUTH EVERY DAY, Disp: 90 tablet, Rfl: 3    celecoxib (CeleBREX) 100 MG capsule, Take 1 capsule by mouth 2 (Two) Times a Day As Needed for Moderate Pain., Disp: 60 capsule, Rfl: 2    chlorthalidone (HYGROTON) 25 MG tablet, Take 1 tablet by mouth Daily., Disp: 90 tablet, Rfl: 3    cyclobenzaprine (FLEXERIL) 10 MG tablet, TAKE 1 TABLET BY MOUTH AT night AS NEEDED FOR MUSCLE SPASMS, Disp: 30 tablet, Rfl: 2    diclofenac (VOLTAREN) 75 MG EC tablet, Take 1 tablet by mouth 2 (Two) Times a Day., Disp: , Rfl:     ezetimibe (ZETIA) 10 MG tablet, TAKE 1 TABLET BY MOUTH DAILY, Disp: 90 tablet, Rfl: 11    hydrALAZINE (APRESOLINE) 50 MG tablet, TAKE 1 TABLET BY MOUTH TWICE DAILY, Disp: 180 tablet, Rfl: 0    HYDROcodone-acetaminophen (NORCO)  MG per tablet, Take 1 tablet by mouth Every 6 (Six) Hours., Disp: , Rfl: 0    levothyroxine (SYNTHROID, LEVOTHROID) 25 MCG tablet, TAKE 1 TABLET BY MOUTH EVERY MORNING, Disp: 90 tablet, Rfl: 1    tiZANidine (ZANAFLEX)  "4 MG tablet, Take 1 tablet by mouth every night at bedtime., Disp: , Rfl: 5    cephalexin (Keflex) 500 MG capsule, Take 1 capsule by mouth 2 (Two) Times a Day. (Patient not taking: Reported on 2024), Disp: 14 capsule, Rfl: 0    Current outpatient and discharge medications have been reconciled for the patient.  Reviewed by: Dustin Redd MD       Allergies   Allergen Reactions    Azithromycin Diarrhea    Lisinopril Cough       Family History   Problem Relation Age of Onset    Lung cancer Mother     Heart disease Father     No Known Problems Sister     No Known Problems Brother     No Known Problems Brother        Past Surgical History:   Procedure Laterality Date    COLONOSCOPY      ELBOW ARTHROSCOPY Right     2x    HYSTERECTOMY      WRIST FRACTURE SURGERY         Past Medical History:   Diagnosis Date    Anemia     Cerebrovascular accident (CVA) 3/8/2012    x2    Hyperlipidemia     Hypertension        Family History   Problem Relation Age of Onset    Lung cancer Mother     Heart disease Father     No Known Problems Sister     No Known Problems Brother     No Known Problems Brother        Social History     Socioeconomic History    Marital status: Single    Number of children: 1   Tobacco Use    Smoking status: Former     Current packs/day: 0.00     Average packs/day: 2.0 packs/day for 35.0 years (70.0 ttl pk-yrs)     Types: Cigarettes     Start date:      Quit date: 2013     Years since quittin.9     Passive exposure: Past    Smokeless tobacco: Never   Vaping Use    Vaping status: Never Used   Substance and Sexual Activity    Alcohol use: Yes     Comment: social    Drug use: No    Sexual activity: Not Currently               Objective:       Physical Exam    /86 (BP Location: Left arm, Patient Position: Sitting, Cuff Size: Adult)   Pulse 85   Ht 157.5 cm (62\")   Wt 73.6 kg (162 lb 3.2 oz)   LMP  (LMP Unknown)   SpO2 96%   BMI 29.67 kg/m²   The patient is alert, oriented and in no " distress.    Vital signs as noted above.    Head and neck revealed no carotid bruits or jugular venous distension.  No thyromegaly or lymphadenopathy is present.    Lungs clear.  No wheezing.  Breath sounds are normal bilaterally.    Heart normal first and second heart sounds.  No murmur..  No pericardial rub is present.  No gallop is present.    Abdomen soft and nontender.  No organomegaly is present.    Extremities revealed good peripheral pulses without any pedal edema.    Skin warm and dry.    Musculoskeletal system is grossly normal.    CNS grossly normal.           Diagnosis Plan   1. Primary hypertension        2. Class 1 obesity due to excess calories with serious comorbidity and body mass index (BMI) of 31.0 to 31.9 in adult        3. Hyperlipidemia, unspecified hyperlipidemia type        4. Bilateral lower extremity edema        5. Obstructive sleep apnea syndrome        6. Heart murmur        7. History of stroke        8. Hypothyroidism, unspecified type        LAB RESULTS (LAST 7 DAYS)    CBC        BMP        CMP         BNP        TROPONIN        CoAg        Creatinine Clearance  CrCl cannot be calculated (Patient's most recent lab result is older than the maximum 30 days allowed.).    ABG        Radiology  No radiology results for the last day    EKG  Procedures    Stress test      Echocardiogram  Results for orders placed during the hospital encounter of 10/03/24    Adult Transthoracic Echo Complete W/ Cont if Necessary Per Protocol    Interpretation Summary    Left ventricular systolic function is normal. Calculated left ventricular EF = 65% Left ventricular ejection fraction appears to be 61 - 65%.    Left ventricular diastolic function is consistent with (grade I) impaired relaxation.    Saline test results are negative for right to left atrial level shunt.    Estimated right ventricular systolic pressure from tricuspid regurgitation is mildly elevated (35-45 mmHg).      Cardiac  catheterization  No results found for this or any previous visit.          Assessment and Plan       Diagnoses and all orders for this visit:    1. Primary hypertension (Primary)    2. Class 1 obesity due to excess calories with serious comorbidity and body mass index (BMI) of 31.0 to 31.9 in adult    3. Hyperlipidemia, unspecified hyperlipidemia type    4. Bilateral lower extremity edema    5. Obstructive sleep apnea syndrome    6. Heart murmur  Overview:  Grade 2/5 Systollic murmur LSB-echo ordered      7. History of stroke    8. Hypothyroidism, unspecified type         Bilateral lower extremity edema  Echocardiogram shows preserved LV function, grade 1 diastolic dysfunction  Continue chlorthalidone  Leg edema has improved     Primary hypertension, chronic  Blood pressure is controlled  Continue amlodipine, chlorthalidone, and hydralazine   Blood pressure and heart rate are well-controlled    Adrenal mass  CT and MRI ordered by PCP due to low back pain  MRI of the abdomen shows masslike enlargement of left adrenal gland.  This could be benign adenoma with adjacent pheochromocytoma.    PET/CT is suggested  She will be following up with oncology     Hyperlipidemia, unspecified hyperlipidemia type  Last LDL 56  Continue atorvastatin and Zetia  Goal LDL <70     History of cerebellar stroke  Continue high intensity statin  Restart aspirin 81 mg daily      Hypothyroidism, unspecified type  On levothyroxine      Class 1 obesity due to excess calories with serious comorbidity   BMI 29.7.  She weighs 162 pounds.  She has lost 5 pounds since last visit  Lifestyle modifications recommended     Obstructive sleep apnea syndrome  Compliance with CPAP recommended      Rest pain of both lower extremities   Bilateral LUH are normal    Future follow-ups will be in [place] office

## 2024-12-16 ENCOUNTER — HOSPITAL ENCOUNTER (OUTPATIENT)
Dept: CARDIOLOGY | Facility: HOSPITAL | Age: 59
Discharge: HOME | End: 2024-12-16
Admitting: NURSE PRACTITIONER
Payer: MEDICARE

## 2024-12-16 DIAGNOSIS — I70.223 REST PAIN OF BOTH LOWER EXTREMITIES DUE TO ATHEROSCLEROSIS: ICD-10-CM

## 2024-12-16 LAB
BH CV LOWER ARTERIAL LEFT ABI RATIO: 1.2
BH CV LOWER ARTERIAL LEFT DORSALIS PEDIS SYS MAX: 118
BH CV LOWER ARTERIAL LEFT GREAT TOE SYS MAX: 89
BH CV LOWER ARTERIAL LEFT POST TIBIAL SYS MAX: 126
BH CV LOWER ARTERIAL LEFT TBI RATIO: 0.85
BH CV LOWER ARTERIAL RIGHT ABI RATIO: 1.19
BH CV LOWER ARTERIAL RIGHT DORSALIS PEDIS SYS MAX: 117
BH CV LOWER ARTERIAL RIGHT GREAT TOE SYS MAX: 103
BH CV LOWER ARTERIAL RIGHT POST TIBIAL SYS MAX: 125
BH CV LOWER ARTERIAL RIGHT TBI RATIO: 0.98
UPPER ARTERIAL LEFT ARM BRACHIAL SYS MAX: 94
UPPER ARTERIAL RIGHT ARM BRACHIAL SYS MAX: 105

## 2024-12-16 PROCEDURE — 93922 UPR/L XTREMITY ART 2 LEVELS: CPT | Performed by: SURGERY

## 2024-12-16 PROCEDURE — 93922 UPR/L XTREMITY ART 2 LEVELS: CPT

## 2024-12-17 ENCOUNTER — HOSPITAL ENCOUNTER (OUTPATIENT)
Dept: PET IMAGING | Facility: HOSPITAL | Age: 59
Discharge: HOME | End: 2024-12-17
Admitting: NURSE PRACTITIONER
Payer: MEDICARE

## 2024-12-17 DIAGNOSIS — E27.8 ADRENAL MASS: ICD-10-CM

## 2024-12-17 DIAGNOSIS — R93.5 ABNORMAL FINDINGS ON DIAGNOSTIC IMAGING OF OTHER ABDOMINAL REGIONS, INCLUDING RETROPERITONEUM: ICD-10-CM

## 2024-12-17 LAB — GLUCOSE BLDC GLUCOMTR-MCNC: 91 MG/DL (ref 70–105)

## 2024-12-17 PROCEDURE — 78815 PET IMAGE W/CT SKULL-THIGH: CPT

## 2024-12-17 PROCEDURE — A9552 F18 FDG: HCPCS | Performed by: NURSE PRACTITIONER

## 2024-12-17 PROCEDURE — 82948 REAGENT STRIP/BLOOD GLUCOSE: CPT

## 2024-12-17 PROCEDURE — 34310000005 FLUDEOXYGLUCOSE F18 SOLUTION: Performed by: NURSE PRACTITIONER

## 2024-12-17 RX ADMIN — FLUDEOXYGLUCOSE F 18 1 DOSE: 200 INJECTION, SOLUTION INTRAVENOUS at 08:20

## 2024-12-19 DIAGNOSIS — E27.9 LESION OF ADRENAL GLAND: ICD-10-CM

## 2024-12-20 ENCOUNTER — OFFICE VISIT (OUTPATIENT)
Dept: CARDIOLOGY | Facility: CLINIC | Age: 59
End: 2024-12-20
Payer: MEDICARE

## 2024-12-20 VITALS
OXYGEN SATURATION: 96 % | HEIGHT: 62 IN | DIASTOLIC BLOOD PRESSURE: 86 MMHG | WEIGHT: 162.2 LBS | SYSTOLIC BLOOD PRESSURE: 139 MMHG | BODY MASS INDEX: 29.85 KG/M2 | HEART RATE: 85 BPM

## 2024-12-20 DIAGNOSIS — E66.09 CLASS 1 OBESITY DUE TO EXCESS CALORIES WITH SERIOUS COMORBIDITY AND BODY MASS INDEX (BMI) OF 31.0 TO 31.9 IN ADULT: ICD-10-CM

## 2024-12-20 DIAGNOSIS — E03.9 HYPOTHYROIDISM, UNSPECIFIED TYPE: ICD-10-CM

## 2024-12-20 DIAGNOSIS — G47.33 OBSTRUCTIVE SLEEP APNEA SYNDROME: ICD-10-CM

## 2024-12-20 DIAGNOSIS — I10 PRIMARY HYPERTENSION: Primary | ICD-10-CM

## 2024-12-20 DIAGNOSIS — R01.1 HEART MURMUR: ICD-10-CM

## 2024-12-20 DIAGNOSIS — E66.811 CLASS 1 OBESITY DUE TO EXCESS CALORIES WITH SERIOUS COMORBIDITY AND BODY MASS INDEX (BMI) OF 31.0 TO 31.9 IN ADULT: ICD-10-CM

## 2024-12-20 DIAGNOSIS — Z86.73 HISTORY OF STROKE: ICD-10-CM

## 2024-12-20 DIAGNOSIS — R60.0 BILATERAL LOWER EXTREMITY EDEMA: ICD-10-CM

## 2024-12-20 DIAGNOSIS — E78.5 HYPERLIPIDEMIA, UNSPECIFIED HYPERLIPIDEMIA TYPE: ICD-10-CM

## 2024-12-20 PROCEDURE — 3075F SYST BP GE 130 - 139MM HG: CPT | Performed by: INTERNAL MEDICINE

## 2024-12-20 PROCEDURE — 99214 OFFICE O/P EST MOD 30 MIN: CPT | Performed by: INTERNAL MEDICINE

## 2024-12-20 PROCEDURE — 1159F MED LIST DOCD IN RCRD: CPT | Performed by: INTERNAL MEDICINE

## 2024-12-20 PROCEDURE — 1160F RVW MEDS BY RX/DR IN RCRD: CPT | Performed by: INTERNAL MEDICINE

## 2024-12-20 PROCEDURE — 3079F DIAST BP 80-89 MM HG: CPT | Performed by: INTERNAL MEDICINE

## 2024-12-23 ENCOUNTER — OFFICE VISIT (OUTPATIENT)
Dept: FAMILY MEDICINE CLINIC | Facility: CLINIC | Age: 59
End: 2024-12-23
Payer: MEDICARE

## 2024-12-23 VITALS
SYSTOLIC BLOOD PRESSURE: 138 MMHG | BODY MASS INDEX: 29.81 KG/M2 | HEART RATE: 86 BPM | DIASTOLIC BLOOD PRESSURE: 80 MMHG | HEIGHT: 62 IN | OXYGEN SATURATION: 97 % | WEIGHT: 162 LBS | TEMPERATURE: 97.3 F

## 2024-12-23 DIAGNOSIS — T78.40XD ALLERGY, SUBSEQUENT ENCOUNTER: ICD-10-CM

## 2024-12-23 DIAGNOSIS — E03.9 HYPOTHYROIDISM, UNSPECIFIED TYPE: ICD-10-CM

## 2024-12-23 DIAGNOSIS — J01.00 ACUTE NON-RECURRENT MAXILLARY SINUSITIS: ICD-10-CM

## 2024-12-23 DIAGNOSIS — E66.3 OVERWEIGHT WITH BODY MASS INDEX (BMI) OF 29 TO 29.9 IN ADULT: ICD-10-CM

## 2024-12-23 DIAGNOSIS — R05.1 ACUTE COUGH: Primary | ICD-10-CM

## 2024-12-23 DIAGNOSIS — R73.09 ELEVATED HEMOGLOBIN A1C: ICD-10-CM

## 2024-12-23 DIAGNOSIS — E78.5 HYPERLIPIDEMIA, UNSPECIFIED HYPERLIPIDEMIA TYPE: ICD-10-CM

## 2024-12-23 DIAGNOSIS — Z00.00 PREVENTATIVE HEALTH CARE: ICD-10-CM

## 2024-12-23 LAB
EXPIRATION DATE: NORMAL
FLUAV AG UPPER RESP QL IA.RAPID: NOT DETECTED
FLUBV AG UPPER RESP QL IA.RAPID: NOT DETECTED
INTERNAL CONTROL: NORMAL
Lab: NORMAL
SARS-COV-2 AG UPPER RESP QL IA.RAPID: NOT DETECTED

## 2024-12-23 PROCEDURE — 3079F DIAST BP 80-89 MM HG: CPT | Performed by: NURSE PRACTITIONER

## 2024-12-23 PROCEDURE — G0439 PPPS, SUBSEQ VISIT: HCPCS | Performed by: NURSE PRACTITIONER

## 2024-12-23 PROCEDURE — 1126F AMNT PAIN NOTED NONE PRSNT: CPT | Performed by: NURSE PRACTITIONER

## 2024-12-23 PROCEDURE — 1170F FXNL STATUS ASSESSED: CPT | Performed by: NURSE PRACTITIONER

## 2024-12-23 PROCEDURE — 99213 OFFICE O/P EST LOW 20 MIN: CPT | Performed by: NURSE PRACTITIONER

## 2024-12-23 PROCEDURE — 87428 SARSCOV & INF VIR A&B AG IA: CPT | Performed by: NURSE PRACTITIONER

## 2024-12-23 PROCEDURE — 3075F SYST BP GE 130 - 139MM HG: CPT | Performed by: NURSE PRACTITIONER

## 2024-12-23 RX ORDER — LEVOTHYROXINE SODIUM 25 UG/1
25 TABLET ORAL
Qty: 90 TABLET | Refills: 1 | Status: SHIPPED | OUTPATIENT
Start: 2024-12-23

## 2024-12-23 RX ORDER — PSEUDOEPHEDRINE HCL 30 MG/1
30 TABLET, FILM COATED ORAL EVERY 4 HOURS PRN
Qty: 60 TABLET | Refills: 2 | Status: SHIPPED | OUTPATIENT
Start: 2024-12-23

## 2024-12-23 RX ORDER — ATORVASTATIN CALCIUM 80 MG/1
80 TABLET, FILM COATED ORAL DAILY
Qty: 90 TABLET | Refills: 1 | Status: SHIPPED | OUTPATIENT
Start: 2024-12-23

## 2024-12-23 RX ORDER — CETIRIZINE HYDROCHLORIDE 10 MG/1
10 TABLET ORAL DAILY
Qty: 30 TABLET | Refills: 2 | Status: SHIPPED | OUTPATIENT
Start: 2024-12-23

## 2024-12-23 RX ORDER — EZETIMIBE 10 MG/1
10 TABLET ORAL DAILY
Qty: 90 TABLET | Refills: 1 | Status: SHIPPED | OUTPATIENT
Start: 2024-12-23

## 2024-12-23 RX ORDER — CELECOXIB 100 MG/1
CAPSULE ORAL
Qty: 60 CAPSULE | Refills: 2 | Status: SHIPPED | OUTPATIENT
Start: 2024-12-23

## 2024-12-23 NOTE — PATIENT INSTRUCTIONS
Fasting blood work  Augmentin 875 twice daily x 10 days  Zyrtec/Sudafed/Benadryl  Wear mask outside  Follow-up 6 months

## 2024-12-23 NOTE — ASSESSMENT & PLAN NOTE
Patient's (Body mass index is 29.62 kg/m².) indicates that they are overweight with health conditions that include hypertension and dyslipidemias . Weight is unchanged. BMI is above average; no BMI management plan is appropriate. We discussed portion control and increasing exercise.

## 2024-12-23 NOTE — PROGRESS NOTES
Subjective   The ABCs of the Annual Wellness Visit  Medicare Wellness Visit      Melodie Carlos is a 59 y.o. patient who presents for a Medicare Wellness Visit.    The following portions of the patient's history were reviewed and   updated as appropriate: allergies, past family history, past medical history, past social history, past surgical history, and problem list.    Compared to one year ago, the patient's physical   health is the same.  Compared to one year ago, the patient's mental   health is the same.    Recent Hospitalizations:  She was not admitted to the hospital during the last year.     Current Medical Providers:  Patient Care Team:  Shiloh Salazar APRN as PCP - General (Nurse Practitioner)  Rigo Snyder MD as Consulting Physician (Gastroenterology)  Diana Alonzo MD as Consulting Physician (Interventional Cardiology)  Keiko Mcgee APRN as Nurse Practitioner (Cardiology)  Keiko Mcgee APRN as Nurse Practitioner (Cardiology)    Outpatient Medications Prior to Visit   Medication Sig Dispense Refill    amLODIPine (NORVASC) 10 MG tablet TAKE 1 TABLET BY MOUTH DAILY 90 tablet 3    aspirin 81 MG EC tablet Take 1 tablet by mouth Daily. 90 tablet 3    celecoxib (CeleBREX) 100 MG capsule Take 1 capsule by mouth 2 (Two) Times a Day As Needed for Moderate Pain. 60 capsule 2    chlorthalidone (HYGROTON) 25 MG tablet Take 1 tablet by mouth Daily. 90 tablet 3    cyclobenzaprine (FLEXERIL) 10 MG tablet TAKE 1 TABLET BY MOUTH AT night AS NEEDED FOR MUSCLE SPASMS 30 tablet 2    diclofenac (VOLTAREN) 75 MG EC tablet Take 1 tablet by mouth 2 (Two) Times a Day.      hydrALAZINE (APRESOLINE) 50 MG tablet TAKE 1 TABLET BY MOUTH TWICE DAILY 180 tablet 0    HYDROcodone-acetaminophen (NORCO)  MG per tablet Take 1 tablet by mouth Every 6 (Six) Hours.  0    tiZANidine (ZANAFLEX) 4 MG tablet Take 1 tablet by mouth every night at bedtime.  5    atorvastatin (LIPITOR) 80 MG tablet TAKE ONE TABLET BY  MOUTH EVERY DAY 90 tablet 3    cephalexin (Keflex) 500 MG capsule Take 1 capsule by mouth 2 (Two) Times a Day. 14 capsule 0    ezetimibe (ZETIA) 10 MG tablet TAKE 1 TABLET BY MOUTH DAILY 90 tablet 11    levothyroxine (SYNTHROID, LEVOTHROID) 25 MCG tablet TAKE 1 TABLET BY MOUTH EVERY MORNING 90 tablet 1     No facility-administered medications prior to visit.     Opioid medication/s are on active medication list.  and I have evaluated her active treatment plan and pain score trends (see table).  Vitals:    12/23/24 0934   PainSc: 0-No pain     I have reviewed the chart for potential of high risk medication and harmful drug interactions in the elderly.        Aspirin is on active medication list. Aspirin use is indicated based on review of current medical condition/s. Pros and cons of this therapy have been discussed today. Benefits of this medication outweigh potential harm.  Patient has been encouraged to continue taking this medication.  .      Patient Active Problem List   Diagnosis    Hyperlipidemia    Hypertension    Hypothyroidism    Anemia    Arthritis    Overweight with body mass index (BMI) of 29 to 29.9 in adult    Family history of malignant neoplasm of trachea, bronchus and lung    Fibromyalgia    Former smoker    Hematuria    Low back pain    Lymphocytic colitis    Neck pain, chronic    Nonspecific abnormal results of function study of kidney    Unilateral inguinal hernia without obstruction or gangrene    Vitamin D deficiency    Acute conjunctivitis    Conjunctivitis due to herpes simplex virus (HSV)    Herpes zoster keratitis of left eye    Presbyopia    COVID-19    H/O pulmonary emphysema    Disorder of intervertebral disc of lumbar spine    Other insomnia    Family history of throat cancer    Chronic left SI joint pain    Heart murmur    Pain of left hand    Benign neoplasm of colon    Benign neoplasm of rectum and anal canal    Colon polyps    Diarrhea    Constipation due to opioid therapy     "Diverticulitis    Dvrtclos of lg int w/o perforation or abscess w/o bleeding    Epigastric pain    Internal hemorrhoids without complication    History of colonic polyps    Lower abdominal pain, unspecified    Pure hypercholesterolemia    Rectal bleeding    Obstructive sleep apnea syndrome    History of cerebellar stroke     Advance Care Planning Advance Directive is not on file.  ACP discussion was declined by the patient. Patient does not have an advance directive, declines further assistance.            Objective   Vitals:    24 0934   BP: 138/80   BP Location: Right arm   Patient Position: Sitting   Cuff Size: Adult   Pulse: 86   Temp: 97.3 °F (36.3 °C)   TempSrc: Infrared   SpO2: 97%   Weight: 73.5 kg (162 lb)   Height: 157.5 cm (62.01\")   PainSc: 0-No pain       Estimated body mass index is 29.62 kg/m² as calculated from the following:    Height as of this encounter: 157.5 cm (62.01\").    Weight as of this encounter: 73.5 kg (162 lb).                Does the patient have evidence of cognitive impairment? No                                                                                                Health  Risk Assessment    Smoking Status:  Social History     Tobacco Use   Smoking Status Former    Current packs/day: 0.00    Average packs/day: 2.0 packs/day for 35.0 years (70.0 ttl pk-yrs)    Types: Cigarettes    Start date:     Quit date:     Years since quittin.9    Passive exposure: Past   Smokeless Tobacco Never     Alcohol Consumption:  Social History     Substance and Sexual Activity   Alcohol Use Yes    Comment: social       Fall Risk Screen  STEADI Fall Risk Assessment was completed, and patient is at LOW risk for falls.Assessment completed on:2024    Depression Screening   Little interest or pleasure in doing things? Not at all   Feeling down, depressed, or hopeless? Not at all   PHQ-2 Total Score 0      Health Habits and Functional and Cognitive Screenin/23/2024 "     9:46 AM   Functional & Cognitive Status   Do you have difficulty preparing food and eating? No   Do you have difficulty bathing yourself, getting dressed or grooming yourself? No   Do you have difficulty using the toilet? No   Do you have difficulty moving around from place to place? No   Do you have trouble with steps or getting out of a bed or a chair? No   Current Diet Well Balanced Diet   Dental Exam Up to date   Eye Exam Up to date   Exercise (times per week) 7 times per week   Current Exercises Include Walking   Do you need help using the phone?  No   Are you deaf or do you have serious difficulty hearing?  No   Do you need help to go to places out of walking distance? No   Do you need help shopping? No   Do you need help preparing meals?  No   Do you need help with housework?  No   Do you need help with laundry? No   Do you need help taking your medications? No   Do you need help managing money? No   Do you ever drive or ride in a car without wearing a seat belt? No   Have you felt unusual stress, anger or loneliness in the last month? No   Who do you live with? Alone   If you need help, do you have trouble finding someone available to you? No   Have you been bothered in the last four weeks by sexual problems? No   Do you have difficulty concentrating, remembering or making decisions? No           Age-appropriate Screening Schedule:  Refer to the list below for future screening recommendations based on patient's age, sex and/or medical conditions. Orders for these recommended tests are listed in the plan section. The patient has been provided with a written plan.    Health Maintenance List  Health Maintenance   Topic Date Due    ZOSTER VACCINE (1 of 2) Never done    PAP SMEAR  07/26/2019    INFLUENZA VACCINE  Never done    COVID-19 Vaccine (3 - 2024-25 season) 09/01/2024    ANNUAL WELLNESS VISIT  12/04/2024    MAMMOGRAM  06/04/2025    LIPID PANEL  06/04/2025    LUNG CANCER SCREENING  09/26/2025    BMI  FOLLOWUP  10/18/2025    TDAP/TD VACCINES (2 - Td or Tdap) 09/14/2026    COLORECTAL CANCER SCREENING  04/13/2027    HEPATITIS C SCREENING  Completed    Pneumococcal Vaccine 0-64  Aged Out                                                                                                                                                CMS Preventative Services Quick Reference  Risk Factors Identified During Encounter  None Identified    The above risks/problems have been discussed with the patient.  Pertinent information has been shared with the patient in the After Visit Summary.  An After Visit Summary and PPPS were made available to the patient.    Follow Up:   Next Medicare Wellness visit to be scheduled in 1 year.         Additional E&M Note during same encounter follows:  Patient has additional, significant, and separately identifiable condition(s)/problem(s) that require work above and beyond the Medicare Wellness Visit     Chief Complaint  Medicare Wellness-subsequent and Results    Subjective   59-year-old white female who recently quit smoking with history of hypertension, hyperlipidemia, sleep apnea, chronic low back pain goes to pain management, CVA x 2 who comes in today for Medicare wellness visit    Blood pressure 138/70 heart rate 86 she denies any chest pain, dyspnea, tachycardia or dizziness    Patient states she has had a bad productive cough with sore throat.  She was negative for COVID influenza however she does have a maxillary sinus infection allergic rhinitis will place her on antibiotics and allergy medication advised her to wear mask outside    She has no other complaints she still goes to pain management.  Weight is 162 with a BMI 29.6.  She has had 2 COVID vaccines update her eye exam.  Her mammogram and DEXA scan are due in September 2025.  She is up-to-date on Pap smears and her next colonoscopy is due in 2032.  She does not have a living will and declined information on making  "1            Fasting blood work  Augmentin 875 twice daily x 10 days  Zyrtec/Sudafed/Benadryl  Wear mask outside  Follow-up 6 months          Review of Systems   Constitutional:  Positive for fatigue.   HENT:  Positive for congestion.    Respiratory:  Positive for cough.    Cardiovascular: Negative.    Gastrointestinal: Negative.    Genitourinary: Negative.    Musculoskeletal:  Positive for back pain.   Skin: Negative.    Neurological: Negative.    Psychiatric/Behavioral: Negative.                Objective   Vital Signs:  /80 (BP Location: Right arm, Patient Position: Sitting, Cuff Size: Adult)   Pulse 86   Temp 97.3 °F (36.3 °C) (Infrared)   Ht 157.5 cm (62.01\")   Wt 73.5 kg (162 lb)   SpO2 97%   BMI 29.62 kg/m²   Physical Exam  Constitutional:       Appearance: Normal appearance.   HENT:      Head: Normocephalic.      Nose:      Comments: Turbinates red     Mouth/Throat:      Comments: Heavy postnasal drip  Cardiovascular:      Rate and Rhythm: Normal rate and regular rhythm.      Pulses: Normal pulses.      Heart sounds: Normal heart sounds.   Pulmonary:      Effort: Pulmonary effort is normal.      Breath sounds: Normal breath sounds.   Abdominal:      General: Bowel sounds are normal.   Musculoskeletal:      Comments: Continued low back pain patient goes to pain management   Skin:     General: Skin is warm.   Neurological:      General: No focal deficit present.      Mental Status: She is alert and oriented to person, place, and time.   Psychiatric:         Mood and Affect: Mood normal.         Behavior: Behavior normal.                       Assessment and Plan            Acute cough    Orders:    POCT SARS-CoV-2 + Flu Antigen CECILIO    Hypothyroidism, unspecified type    Orders:    TSH+Free T4    T3    Hyperlipidemia, unspecified hyperlipidemia type       Orders:    Lipid Panel With LDL / HDL Ratio    Preventative health care    Orders:    CBC & Differential    Elevated hemoglobin A1c    Orders:    " Comprehensive Metabolic Panel    Hemoglobin A1c    Overweight with body mass index (BMI) of 29 to 29.9 in adult  Patient's (Body mass index is 29.62 kg/m².) indicates that they are overweight with health conditions that include hypertension and dyslipidemias . Weight is unchanged. BMI is above average; no BMI management plan is appropriate. We discussed portion control and increasing exercise.          Allergy, subsequent encounter         Acute non-recurrent maxillary sinusitis                 Follow Up   Return in about 6 months (around 6/23/2025).  Patient was given instructions and counseling regarding her condition or for health maintenance advice. Please see specific information pulled into the AVS if appropriate.

## 2024-12-24 LAB
ALBUMIN SERPL-MCNC: 4.6 G/DL (ref 3.9–4.9)
ALP SERPL-CCNC: 134 IU/L (ref 44–121)
ALT SERPL-CCNC: 27 IU/L (ref 0–32)
AST SERPL-CCNC: 29 IU/L (ref 0–40)
BASOPHILS # BLD AUTO: 0.1 X10E3/UL (ref 0–0.2)
BASOPHILS NFR BLD AUTO: 1 %
BILIRUB SERPL-MCNC: 0.5 MG/DL (ref 0–1.2)
BUN SERPL-MCNC: 16 MG/DL (ref 8–27)
BUN/CREAT SERPL: 23 (ref 12–28)
CALCIUM SERPL-MCNC: 9.7 MG/DL (ref 8.7–10.3)
CHLORIDE SERPL-SCNC: 86 MMOL/L (ref 96–106)
CHOLEST SERPL-MCNC: 129 MG/DL (ref 100–199)
CO2 SERPL-SCNC: 28 MMOL/L (ref 20–29)
CREAT SERPL-MCNC: 0.71 MG/DL (ref 0.57–1)
EGFRCR SERPLBLD CKD-EPI 2021: 97 ML/MIN/1.73
EOSINOPHIL # BLD AUTO: 0.7 X10E3/UL (ref 0–0.4)
EOSINOPHIL NFR BLD AUTO: 6 %
ERYTHROCYTE [DISTWIDTH] IN BLOOD BY AUTOMATED COUNT: 12.7 % (ref 11.7–15.4)
GLOBULIN SER CALC-MCNC: 2.4 G/DL (ref 1.5–4.5)
GLUCOSE SERPL-MCNC: 84 MG/DL (ref 70–99)
HBA1C MFR BLD: 5.9 % (ref 4.8–5.6)
HCT VFR BLD AUTO: 39.1 % (ref 34–46.6)
HDLC SERPL-MCNC: 60 MG/DL
HGB BLD-MCNC: 13 G/DL (ref 11.1–15.9)
IMM GRANULOCYTES # BLD AUTO: 0.1 X10E3/UL (ref 0–0.1)
IMM GRANULOCYTES NFR BLD AUTO: 1 %
LDLC SERPL CALC-MCNC: 50 MG/DL (ref 0–99)
LDLC/HDLC SERPL: 0.8 RATIO (ref 0–3.2)
LYMPHOCYTES # BLD AUTO: 2.1 X10E3/UL (ref 0.7–3.1)
LYMPHOCYTES NFR BLD AUTO: 18 %
MCH RBC QN AUTO: 29.4 PG (ref 26.6–33)
MCHC RBC AUTO-ENTMCNC: 33.2 G/DL (ref 31.5–35.7)
MCV RBC AUTO: 89 FL (ref 79–97)
MONOCYTES # BLD AUTO: 0.7 X10E3/UL (ref 0.1–0.9)
MONOCYTES NFR BLD AUTO: 6 %
NEUTROPHILS # BLD AUTO: 8.2 X10E3/UL (ref 1.4–7)
NEUTROPHILS NFR BLD AUTO: 68 %
PLATELET # BLD AUTO: 343 X10E3/UL (ref 150–450)
POTASSIUM SERPL-SCNC: 3.4 MMOL/L (ref 3.5–5.2)
PROT SERPL-MCNC: 7 G/DL (ref 6–8.5)
RBC # BLD AUTO: 4.42 X10E6/UL (ref 3.77–5.28)
SODIUM SERPL-SCNC: 135 MMOL/L (ref 134–144)
T3 SERPL-MCNC: 143 NG/DL (ref 71–180)
T4 FREE SERPL-MCNC: 1.13 NG/DL (ref 0.82–1.77)
TRIGL SERPL-MCNC: 104 MG/DL (ref 0–149)
TSH SERPL DL<=0.005 MIU/L-ACNC: 2.57 UIU/ML (ref 0.45–4.5)
VLDLC SERPL CALC-MCNC: 19 MG/DL (ref 5–40)
WBC # BLD AUTO: 11.9 X10E3/UL (ref 3.4–10.8)

## 2024-12-26 NOTE — PROGRESS NOTES
Chief Complaint  Sleep Apnea    Subjective          Melodie Carlos presents to [facility] NEUROLOGY  History of Present Illness    Melodie Carlos is a 59-year-old female seen today in follow-up for severe SANDY and CPAP management.  The patient was diagnosed with SANDY from a sleep study done in 2023 which showed an overall AHI of 33.8.  She struggled with compliance with PAP therapy due to longstanding insomnia.  The patient has trouble falling asleep and staying asleep.  She rarely sleeps more than 6 hours.  She states she often wakes up due to pain, but can wake up because when she rolls over her mask slides around and becomes uncomfortable.  She is taking Flexeril at night which does help her fall asleep.  She denies having any daytime somnolence throughout the day, and wakes up feeling refreshed especially if she gets to sleep more than 4 hours.  She currently uses a nasal mask and gets resupply through [employer].    The patient reports that she has lost about 20 pounds intentionally since the summer.        Sleep testing history:    On NPSG at [facility] , 1/30/23 patient had Severe obstructive sleep apnea syndrome with apnea-hypopnea index of 33.8 per sleep hour, minimum SpO2 of 83%    PAP download ([facility] 10/2/24-12/30/24):  The patient is on CPAP therapy at 7-10 cm/H2O.   Data indicates Minimal compliance. With 32% usage for more than 4 hours but 96% days used over the last 90 days.  With an average usage of 3 hours 28 minutes. AHI down to 1.4 .  Average pressures 8.1 centimeters H2O.  Average leak 7.4 liters per minute.     The patient's hypersomnia has improved       [place] Sleepiness Scale:  Sitting and reading JS [place] Sleepiness: 0 WatchingTV JS [place] Sleepiness: 0  Sitting, inactive, in a public place JS [place] Sleepiness: 0  As a passenger in a car for 1 hour w/o a break  JS [place] Sleepiness: 0  Lying down to rest in the afternoon JS [place] Sleepiness: 0  Sitting and  "talking to someone  JS [place] Sleepiness: 0  Sitting quietly after a lunch  JS [place] Sleepiness: 1  In a car, while stopped for traffic or a light  JS [place] Sleepiness: 0  Total 1    Review of Systems   Respiratory:  Positive for apnea.    All other systems reviewed and are negative.     Objective   Vital Signs:   /81   Pulse 97   Ht 157.5 cm (62.01\")   Wt 73 kg (161 lb)   BMI 29.44 kg/m²     Physical Exam  Vitals reviewed.   Constitutional:       Appearance: She is well-developed.   HENT:      Head: Normocephalic and atraumatic.   Eyes:      Extraocular Movements: Extraocular movements intact.      Pupils: Pupils are equal, round, and reactive to light.   Cardiovascular:      Rate and Rhythm: Normal rate.      Heart sounds: No murmur heard.  Pulmonary:      Effort: Pulmonary effort is normal.   Musculoskeletal:      Cervical back: Normal range of motion and neck supple.   Skin:     General: Skin is warm and dry.   Neurological:      Mental Status: She is alert and oriented to person, place, and time.      Cranial Nerves: Cranial nerves 2-12 are intact. No cranial nerve deficit.      Sensory: No sensory deficit.      Motor: Motor function is intact. No tremor.      Coordination: Finger-Nose-Finger Test normal. Rapid alternating movements normal.      Gait: Gait is intact. Gait normal.   Psychiatric:         Attention and Perception: Attention normal.         Mood and Affect: Mood normal.         Speech: Speech normal.         Behavior: Behavior normal.         Cognition and Memory: Cognition and memory normal.         Judgment: Judgment normal.        Result Review :                 Assessment and Plan    Diagnoses and all orders for this visit:    1. Obstructive sleep apnea (Primary)  -     PAP Therapy  -     Home Sleep Study; Future    2. Weight loss  -     Home Sleep Study; Future    3. Other insomnia  Overview:  Awakens  And can't stay asleep X1 year.      Melodie Carlos is seen today in " follow-up for SANDY and CPAP management.  The patient has severe sleep apnea and wears a nasal pillow and receives resupply through [employer].  The patient has severe chronic insomnia and rarely sleeps more than 3 hours.  She is using her CPAP 96% over the last 90 days but only 32% over 4 hours.  Patient reports that she feels well rested in the morning upon waking with the device and it is effective in treating her sleep apnea with an overall AHI of 1.4.    The patient has lost around 20 pounds since the summer and has been able to maintain this weight.  I would like for her to keep the weight off and we can repeat a home sleep study in March.  I explained that this test needs to be done off CPAP for 1 week.    Otherwise she will continue her PAP therapy over 4 hours every night.    Talked again about seeing Dr. Gutierrez for insomnia.  Patient is still not interested    She will follow-up in 6 months    The patient is compliant with and benefiting from PAP therapy.      Follow Up   Return in about 6 months (around 7/2/2025).    Patient was given instructions and counseling regarding her condition or for health maintenance advice. Please see specific information pulled into the AVS if appropriate.       This document has been electronically signed by RUBIN Christina on January 2, 2025 12:31 EST

## 2025-01-02 ENCOUNTER — OFFICE VISIT (OUTPATIENT)
Dept: NEUROLOGY | Facility: CLINIC | Age: 60
End: 2025-01-02
Payer: MEDICARE

## 2025-01-02 VITALS
HEART RATE: 97 BPM | SYSTOLIC BLOOD PRESSURE: 127 MMHG | WEIGHT: 161 LBS | BODY MASS INDEX: 29.63 KG/M2 | DIASTOLIC BLOOD PRESSURE: 81 MMHG | HEIGHT: 62 IN

## 2025-01-02 DIAGNOSIS — R63.4 WEIGHT LOSS: ICD-10-CM

## 2025-01-02 DIAGNOSIS — G47.09 OTHER INSOMNIA: ICD-10-CM

## 2025-01-02 DIAGNOSIS — G47.33 OBSTRUCTIVE SLEEP APNEA: Primary | ICD-10-CM

## 2025-02-21 RX ORDER — LEVOTHYROXINE SODIUM 25 UG/1
25 TABLET ORAL
Qty: 90 TABLET | Refills: 1 | Status: SHIPPED | OUTPATIENT
Start: 2025-02-21

## 2025-03-10 ENCOUNTER — OFFICE VISIT (OUTPATIENT)
Dept: FAMILY MEDICINE CLINIC | Facility: CLINIC | Age: 60
End: 2025-03-10
Payer: MEDICARE

## 2025-03-10 VITALS
BODY MASS INDEX: 30.88 KG/M2 | HEART RATE: 85 BPM | SYSTOLIC BLOOD PRESSURE: 122 MMHG | TEMPERATURE: 97.3 F | HEIGHT: 62 IN | WEIGHT: 167.8 LBS | RESPIRATION RATE: 16 BRPM | DIASTOLIC BLOOD PRESSURE: 77 MMHG
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
## 2025-03-10 DIAGNOSIS — S60.561A INSECT BITE OF RIGHT HAND, INITIAL ENCOUNTER: Primary | ICD-10-CM

## 2025-03-10 RX ORDER — PREDNISONE 10 MG/1
TABLET ORAL
Qty: 19 TABLET | Refills: 0 | Status: SHIPPED | OUTPATIENT
Start: 2025-03-10 | End: 2025-03-22

## 2025-03-10 RX ORDER — CETIRIZINE HYDROCHLORIDE 10 MG/1
10 TABLET ORAL DAILY
Qty: 30 TABLET | Refills: 2 | Status: SHIPPED | OUTPATIENT
Start: 2025-03-10

## 2025-03-10 NOTE — PROGRESS NOTES
"    Melodie Carlos is a 60 y.o. female.     History of Present Illness  60-year-old white female who recently quit smoking with history of hypertension, hyperlipidemia, SANDY, low back pain goes to pain management CVA x 2 who comes in today with right index finger swollen when she woke up this morning.  There is a little white puncture site at the base of the finger about the knuckle.  Will place her on steroids take the swelling down told her to use ice tonight then start with heat.  Also told her to take Zyrtec twice a day while she is on the steroids    Blood pressure 122/76 heart rate 84 she denies any chest pain, dyspnea, tachycardia or dizziness    Weight is 168 with BMI of 30.7.  She has had 2 COVID vaccines update her eye exam.  Her mammogram and DEXA scan are due in September 2025 and she is up-to-date on Pap smears.  Her next colonoscopy is due in 2032          Prednisone 10 mg taper dose  Zyrtec 10 mg twice a day  Ice finger today and tomorrow then heat  Call office by Thursday if finger worsens       The following portions of the patient's history were reviewed and updated as appropriate: allergies, current medications, past family history, past medical history, past social history, past surgical history, and problem list.    Vitals:    03/10/25 1526   BP: 122/77   BP Location: Left arm   Patient Position: Sitting   Cuff Size: Adult   Pulse: 85   Resp: 16   Temp: 97.3 °F (36.3 °C)   Weight: 76.1 kg (167 lb 12.8 oz)   Height: 157.5 cm (62.01\")       Past Medical History:   Diagnosis Date    Anemia     Cerebrovascular accident (CVA) 3/8/2012    x2    Hyperlipidemia     Hypertension      Past Surgical History:   Procedure Laterality Date    COLONOSCOPY      ELBOW ARTHROSCOPY Right 2004    2x    HYSTERECTOMY      WRIST FRACTURE SURGERY       Family History   Problem Relation Age of Onset    Lung cancer Mother     Heart disease Father     No Known Problems Sister     No Known Problems Brother     No Known " Problems Brother      Immunization History   Administered Date(s) Administered    COVID-19 (MODERNA) 1st,2nd,3rd Dose Monovalent 03/24/2021, 04/22/2021    Tdap 09/14/2016       Office Visit on 12/23/2024   Component Date Value Ref Range Status    SARS Antigen 12/23/2024 Not Detected  Not Detected, Presumptive Negative Final    Influenza A Antigen CECILIO 12/23/2024 Not Detected  Not Detected Final    Influenza B Antigen CECILIO 12/23/2024 Not Detected  Not Detected Final    Internal Control 12/23/2024 Passed  Passed Final    Lot Number 12/23/2024 [id number]   Final    Expiration Date 12/23/2024 10,182,025   Final    WBC 12/23/2024 11.9 (H)  3.4 - 10.8 x10E3/uL Final    RBC 12/23/2024 4.42  3.77 - 5.28 x10E6/uL Final    Hemoglobin 12/23/2024 13.0  11.1 - 15.9 g/dL Final    Hematocrit 12/23/2024 39.1  34.0 - 46.6 % Final    MCV 12/23/2024 89  79 - 97 fL Final    MCH 12/23/2024 29.4  26.6 - 33.0 pg Final    MCHC 12/23/2024 33.2  31.5 - 35.7 g/dL Final    RDW 12/23/2024 12.7  11.7 - 15.4 % Final    Platelets 12/23/2024 343  150 - 450 x10E3/uL Final    Neutrophil Rel % 12/23/2024 68  Not Estab. % Final    Lymphocyte Rel % 12/23/2024 18  Not Estab. % Final    Monocyte Rel % 12/23/2024 6  Not Estab. % Final    Eosinophil Rel % 12/23/2024 6  Not Estab. % Final    Basophil Rel % 12/23/2024 1  Not Estab. % Final    Neutrophils Absolute 12/23/2024 8.2 (H)  1.4 - 7.0 x10E3/uL Final    Lymphocytes Absolute 12/23/2024 2.1  0.7 - 3.1 x10E3/uL Final    Monocytes Absolute 12/23/2024 0.7  0.1 - 0.9 x10E3/uL Final    Eosinophils Absolute 12/23/2024 0.7 (H)  0.0 - 0.4 x10E3/uL Final    Basophils Absolute 12/23/2024 0.1  0.0 - 0.2 x10E3/uL Final    Immature Granulocyte Rel % 12/23/2024 1  Not Estab. % Final    Immature Grans Absolute 12/23/2024 0.1  0.0 - 0.1 x10E3/uL Final    Glucose 12/23/2024 84  70 - 99 mg/dL Final    BUN 12/23/2024 16  8 - 27 mg/dL Final    Creatinine 12/23/2024 0.71  0.57 - 1.00 mg/dL Final    EGFR Result 12/23/2024 97   >59 mL/min/1.73 Final    BUN/Creatinine Ratio 12/23/2024 23  12 - 28 Final    Sodium 12/23/2024 135  134 - 144 mmol/L Final    Potassium 12/23/2024 3.4 (L)  3.5 - 5.2 mmol/L Final    Chloride 12/23/2024 86 (L)  96 - 106 mmol/L Final    Total CO2 12/23/2024 28  20 - 29 mmol/L Final    Calcium 12/23/2024 9.7  8.7 - 10.3 mg/dL Final    Total Protein 12/23/2024 7.0  6.0 - 8.5 g/dL Final    Albumin 12/23/2024 4.6  3.9 - 4.9 g/dL Final    Globulin 12/23/2024 2.4  1.5 - 4.5 g/dL Final    Total Bilirubin 12/23/2024 0.5  0.0 - 1.2 mg/dL Final    Alkaline Phosphatase 12/23/2024 134 (H)  44 - 121 IU/L Final    AST (SGOT) 12/23/2024 29  0 - 40 IU/L Final    ALT (SGPT) 12/23/2024 27  0 - 32 IU/L Final    Hemoglobin A1C 12/23/2024 5.9 (H)  4.8 - 5.6 % Final    Comment:          Prediabetes: 5.7 - 6.4           Diabetes: >6.4           Glycemic control for adults with diabetes: <7.0      Total Cholesterol 12/23/2024 129  100 - 199 mg/dL Final    Triglycerides 12/23/2024 104  0 - 149 mg/dL Final    HDL Cholesterol 12/23/2024 60  >39 mg/dL Final    VLDL Cholesterol Compa 12/23/2024 19  5 - 40 mg/dL Final    LDL Chol Calc (NIH) 12/23/2024 50  0 - 99 mg/dL Final    LDL/HDL RATIO 12/23/2024 0.8  0.0 - 3.2 ratio Final    Comment:                                     LDL/HDL Ratio                                              Men  Women                                1/2 Avg.Risk  1.0    1.5                                    Avg.Risk  3.6    3.2                                 2X Avg.Risk  6.2    5.0                                 3X Avg.Risk  8.0    6.1      TSH 12/23/2024 2.570  0.450 - 4.500 uIU/mL Final    Free T4 12/23/2024 1.13  0.82 - 1.77 ng/dL Final    T3, Total 12/23/2024 143  71 - 180 ng/dL Final         Review of Systems   Constitutional: Negative.    HENT: Negative.     Respiratory: Negative.     Cardiovascular: Negative.    Gastrointestinal: Negative.    Genitourinary: Negative.    Musculoskeletal:         Swollen right index  finger   Skin: Negative.    Neurological: Negative.    Psychiatric/Behavioral: Negative.         Objective   Physical Exam  Constitutional:       Appearance: Normal appearance.   HENT:      Head: Normocephalic.   Cardiovascular:      Rate and Rhythm: Normal rate and regular rhythm.      Pulses: Normal pulses.      Heart sounds: Normal heart sounds.   Pulmonary:      Effort: Pulmonary effort is normal.      Breath sounds: Normal breath sounds.   Abdominal:      General: Bowel sounds are normal.   Musculoskeletal:      Comments: Right index finger swollen and slightly red   Skin:     General: Skin is warm.   Neurological:      General: No focal deficit present.      Mental Status: She is alert and oriented to person, place, and time.   Psychiatric:         Mood and Affect: Mood normal.         Behavior: Behavior normal.         Procedures    Assessment & Plan   There are no diagnoses linked to this encounter.       Current Outpatient Medications:     cetirizine (ZyrTEC Allergy) 10 MG tablet, Take 1 tablet by mouth Daily., Disp: 30 tablet, Rfl: 2    amLODIPine (NORVASC) 10 MG tablet, TAKE 1 TABLET BY MOUTH DAILY, Disp: 90 tablet, Rfl: 3    amoxicillin-clavulanate (AUGMENTIN) 875-125 MG per tablet, Take 1 tablet by mouth 2 (Two) Times a Day., Disp: 20 tablet, Rfl: 0    aspirin 81 MG EC tablet, Take 1 tablet by mouth Daily., Disp: 90 tablet, Rfl: 3    atorvastatin (LIPITOR) 80 MG tablet, Take 1 tablet by mouth Daily., Disp: 90 tablet, Rfl: 1    celecoxib (CeleBREX) 100 MG capsule, TAKE ONE CAPSULE BY MOUTH TWICE DAILY AS NEEDED FOR moderate pain, Disp: 60 capsule, Rfl: 2    chlorthalidone (HYGROTON) 25 MG tablet, Take 1 tablet by mouth Daily., Disp: 90 tablet, Rfl: 3    cyclobenzaprine (FLEXERIL) 10 MG tablet, TAKE 1 TABLET BY MOUTH AT night AS NEEDED FOR MUSCLE SPASMS, Disp: 30 tablet, Rfl: 2    diclofenac (VOLTAREN) 75 MG EC tablet, Take 1 tablet by mouth 2 (Two) Times a Day., Disp: , Rfl:     ezetimibe (ZETIA) 10 MG  tablet, Take 1 tablet by mouth Daily., Disp: 90 tablet, Rfl: 1    hydrALAZINE (APRESOLINE) 50 MG tablet, TAKE 1 TABLET BY MOUTH TWICE DAILY, Disp: 180 tablet, Rfl: 0    HYDROcodone-acetaminophen (NORCO)  MG per tablet, Take 1 tablet by mouth Every 6 (Six) Hours., Disp: , Rfl: 0    levothyroxine (SYNTHROID, LEVOTHROID) 25 MCG tablet, TAKE 1 TABLET BY MOUTH EVERY MORNING, Disp: 90 tablet, Rfl: 1    predniSONE (DELTASONE) 10 MG tablet, Take 3 tablets by mouth Daily for 3 days, THEN 2 tablets Daily for 3 days, THEN 1 tablet Daily for 2 days, THEN 0.5 tablets Daily for 4 days., Disp: 19 tablet, Rfl: 0    pseudoephedrine (Sudafed) 30 MG tablet, Take 1 tablet by mouth Every 4 (Four) Hours As Needed for Congestion., Disp: 60 tablet, Rfl: 2           RUBIN Stoddard 3/10/2025 15:37 EDT  This note has been electronically signed

## 2025-03-10 NOTE — PATIENT INSTRUCTIONS
Prednisone 10 mg taper dose  Zyrtec 10 mg twice a day  Ice finger today and tomorrow then heat  Call office by Thursday if finger worsens

## 2025-03-17 ENCOUNTER — HOSPITAL ENCOUNTER (OUTPATIENT)
Dept: SLEEP MEDICINE | Facility: HOSPITAL | Age: 60
Discharge: HOME | End: 2025-03-17
Admitting: NURSE PRACTITIONER
Payer: MEDICARE

## 2025-03-17 DIAGNOSIS — R63.4 WEIGHT LOSS: ICD-10-CM

## 2025-03-17 DIAGNOSIS — G47.33 OBSTRUCTIVE SLEEP APNEA: ICD-10-CM

## 2025-03-17 PROCEDURE — G0399 HOME SLEEP TEST/TYPE 3 PORTA: HCPCS

## 2025-03-24 ENCOUNTER — TELEPHONE (OUTPATIENT)
Dept: FAMILY MEDICINE CLINIC | Facility: CLINIC | Age: 60
End: 2025-03-24

## 2025-03-24 RX ORDER — NYSTATIN 100000 [USP'U]/ML
500000 SUSPENSION ORAL 4 TIMES DAILY
Qty: 473 ML | Refills: 2 | Status: SHIPPED | OUTPATIENT
Start: 2025-03-24

## 2025-03-24 NOTE — TELEPHONE ENCOUNTER
Caller: Terrance Melodie L    Relationship: Self    Best call back number: [phone]    Requested Prescriptions: MEDICATION FOR MOUTH RASH  Requested Prescriptions      No prescriptions requested or ordered in this encounter        Pharmacy where request should be sent:    [address] - [phone]  - [phone]  [phone]   Last office visit with prescribing clinician: 3/10/2025   Last telemedicine visit with prescribing clinician: Visit date not found   Next office visit with prescribing clinician: 6/23/2025     Additional details provided by patient: PATIENT IS CALLING TO STATE SHE HAS A RASH IN HER MOUTH SINCE FINISHING THE PREDNISONE.  SHE STATES SHE HAS USED OVER THE COUNTER MEDS.  SHE IS WANTING TO KNOW IF [facility] WOULD PRESCRIBE A MEDICATION.    Does the patient have less than a 3 day supply:  [x] Yes  [] No    Would you like a call back once the refill request has been completed: [] Yes [] No    If the office needs to give you a call back, can they leave a voicemail: [] Yes [] No    Humble Enrique Rep   03/24/25 08:53 EDT     PLEASE ADVISE.

## 2025-04-05 RX ORDER — CELECOXIB 100 MG/1
CAPSULE ORAL
Qty: 60 CAPSULE | Refills: 2 | Status: SHIPPED | OUTPATIENT
Start: 2025-04-05

## 2025-06-17 RX ORDER — ATORVASTATIN CALCIUM 80 MG/1
80 TABLET, FILM COATED ORAL DAILY
Qty: 90 TABLET | Refills: 0 | Status: SHIPPED | OUTPATIENT
Start: 2025-06-17

## 2025-06-23 ENCOUNTER — OFFICE VISIT (OUTPATIENT)
Dept: FAMILY MEDICINE CLINIC | Facility: CLINIC | Age: 60
End: 2025-06-23
Payer: MEDICARE

## 2025-06-23 VITALS
TEMPERATURE: 97.3 F | BODY MASS INDEX: 31.1 KG/M2 | HEART RATE: 57 BPM | HEIGHT: 62 IN | WEIGHT: 169.02 LBS | OXYGEN SATURATION: 98 % | DIASTOLIC BLOOD PRESSURE: 81 MMHG | SYSTOLIC BLOOD PRESSURE: 131 MMHG | RESPIRATION RATE: 16 BRPM

## 2025-06-23 DIAGNOSIS — Z78.0 POSTMENOPAUSAL: ICD-10-CM

## 2025-06-23 DIAGNOSIS — E66.811 CLASS 1 OBESITY DUE TO EXCESS CALORIES WITH SERIOUS COMORBIDITY AND BODY MASS INDEX (BMI) OF 30.0 TO 30.9 IN ADULT: ICD-10-CM

## 2025-06-23 DIAGNOSIS — Z12.31 ENCOUNTER FOR SCREENING MAMMOGRAM FOR MALIGNANT NEOPLASM OF BREAST: ICD-10-CM

## 2025-06-23 DIAGNOSIS — R73.09 ELEVATED HEMOGLOBIN A1C: ICD-10-CM

## 2025-06-23 DIAGNOSIS — Z87.891 FORMER SMOKER: ICD-10-CM

## 2025-06-23 DIAGNOSIS — G47.33 OBSTRUCTIVE SLEEP APNEA: ICD-10-CM

## 2025-06-23 DIAGNOSIS — E78.5 HYPERLIPIDEMIA, UNSPECIFIED HYPERLIPIDEMIA TYPE: ICD-10-CM

## 2025-06-23 DIAGNOSIS — E03.9 HYPOTHYROIDISM, UNSPECIFIED TYPE: Primary | ICD-10-CM

## 2025-06-23 DIAGNOSIS — E66.09 CLASS 1 OBESITY DUE TO EXCESS CALORIES WITH SERIOUS COMORBIDITY AND BODY MASS INDEX (BMI) OF 30.0 TO 30.9 IN ADULT: ICD-10-CM

## 2025-06-23 DIAGNOSIS — Z00.00 PREVENTATIVE HEALTH CARE: ICD-10-CM

## 2025-06-23 DIAGNOSIS — E53.8 B12 DEFICIENCY: ICD-10-CM

## 2025-06-23 PROCEDURE — G2211 COMPLEX E/M VISIT ADD ON: HCPCS | Performed by: NURSE PRACTITIONER

## 2025-06-23 PROCEDURE — 1126F AMNT PAIN NOTED NONE PRSNT: CPT | Performed by: NURSE PRACTITIONER

## 2025-06-23 PROCEDURE — 99214 OFFICE O/P EST MOD 30 MIN: CPT | Performed by: NURSE PRACTITIONER

## 2025-06-23 PROCEDURE — 3075F SYST BP GE 130 - 139MM HG: CPT | Performed by: NURSE PRACTITIONER

## 2025-06-23 PROCEDURE — 3079F DIAST BP 80-89 MM HG: CPT | Performed by: NURSE PRACTITIONER

## 2025-06-23 NOTE — PROGRESS NOTES
"    Melodie Carlos is a 60 y.o. female.     History of Present Illness  60-year-old white female who recently quit smoking with history of hypertension, hyperlipidemia, SANDY, low back pain who goes to pain management, CVA x 2 who comes in today for follow-up visit fasting blood work    Blood pressure 130/80 heart rate 56 she denies any chest pain, dyspnea, tachycardia or dizziness    Patient states she was out in the sun quite a bit and had a day of diarrhea and a bit of queasiness but she states that has passed she took Imodium.  Told her to let me know if it returns or will not go away    Patient has not been wearing her CPAP because she cannot sleep with that she does have an upcoming appointment concerning her sleep apnea and I suggested she might try different mask    On last blood work potassium was low A1c was elevated we will be doing fasting labs today.  Weight is 169 with a BMI of 30.9.  She has had 2 COVID vaccines she needs to schedule an eye exam.  Mammogram and DEXA scan are due in September and I ordered those at priority per her request and she will schedule.  She declines Pap smears her next colonoscopy is due in 2032            Fasting blood work  Mammogram and DEXA scan at priority  Keep appointment for sleep apnea  Report if nausea or diarrhea does not resolve  Please schedule eye exam  Follow-up 6 months  Hypertension       The following portions of the patient's history were reviewed and updated as appropriate: allergies, current medications, past family history, past medical history, past social history, past surgical history, and problem list.    Vitals:    06/23/25 0804   BP: 131/81   BP Location: Right arm   Patient Position: Sitting   Cuff Size: Adult   Pulse: 57   Resp: 16   Temp: 97.3 °F (36.3 °C)   SpO2: 98%   Weight: 76.7 kg (169 lb 0.3 oz)   Height: 157.5 cm (62.01\")       Past Medical History:   Diagnosis Date    Anemia     Cerebrovascular accident (CVA) 3/8/2012    x2    " Hyperlipidemia     Hypertension      Past Surgical History:   Procedure Laterality Date    COLONOSCOPY      ELBOW ARTHROSCOPY Right 2004    2x    HYSTERECTOMY      WRIST FRACTURE SURGERY       Family History   Problem Relation Age of Onset    Lung cancer Mother     Heart disease Father     No Known Problems Sister     No Known Problems Brother     No Known Problems Brother      Immunization History   Administered Date(s) Administered    COVID-19 (MODERNA) 1st,2nd,3rd Dose Monovalent 03/24/2021, 04/22/2021    Tdap 09/14/2016       Office Visit on 12/23/2024   Component Date Value Ref Range Status    SARS Antigen 12/23/2024 Not Detected  Not Detected, Presumptive Negative Final    Influenza A Antigen CECILIO 12/23/2024 Not Detected  Not Detected Final    Influenza B Antigen CECILIO 12/23/2024 Not Detected  Not Detected Final    Internal Control 12/23/2024 Passed  Passed Final    Lot Number 12/23/2024 [id number]   Final    Expiration Date 12/23/2024 10,182,025   Final    WBC 12/23/2024 11.9 (H)  3.4 - 10.8 x10E3/uL Final    RBC 12/23/2024 4.42  3.77 - 5.28 x10E6/uL Final    Hemoglobin 12/23/2024 13.0  11.1 - 15.9 g/dL Final    Hematocrit 12/23/2024 39.1  34.0 - 46.6 % Final    MCV 12/23/2024 89  79 - 97 fL Final    MCH 12/23/2024 29.4  26.6 - 33.0 pg Final    MCHC 12/23/2024 33.2  31.5 - 35.7 g/dL Final    RDW 12/23/2024 12.7  11.7 - 15.4 % Final    Platelets 12/23/2024 343  150 - 450 x10E3/uL Final    Neutrophil Rel % 12/23/2024 68  Not Estab. % Final    Lymphocyte Rel % 12/23/2024 18  Not Estab. % Final    Monocyte Rel % 12/23/2024 6  Not Estab. % Final    Eosinophil Rel % 12/23/2024 6  Not Estab. % Final    Basophil Rel % 12/23/2024 1  Not Estab. % Final    Neutrophils Absolute 12/23/2024 8.2 (H)  1.4 - 7.0 x10E3/uL Final    Lymphocytes Absolute 12/23/2024 2.1  0.7 - 3.1 x10E3/uL Final    Monocytes Absolute 12/23/2024 0.7  0.1 - 0.9 x10E3/uL Final    Eosinophils Absolute 12/23/2024 0.7 (H)  0.0 - 0.4 x10E3/uL Final     Basophils Absolute 12/23/2024 0.1  0.0 - 0.2 x10E3/uL Final    Immature Granulocyte Rel % 12/23/2024 1  Not Estab. % Final    Immature Grans Absolute 12/23/2024 0.1  0.0 - 0.1 x10E3/uL Final    Glucose 12/23/2024 84  70 - 99 mg/dL Final    BUN 12/23/2024 16  8 - 27 mg/dL Final    Creatinine 12/23/2024 0.71  0.57 - 1.00 mg/dL Final    EGFR Result 12/23/2024 97  >59 mL/min/1.73 Final    BUN/Creatinine Ratio 12/23/2024 23  12 - 28 Final    Sodium 12/23/2024 135  134 - 144 mmol/L Final    Potassium 12/23/2024 3.4 (L)  3.5 - 5.2 mmol/L Final    Chloride 12/23/2024 86 (L)  96 - 106 mmol/L Final    Total CO2 12/23/2024 28  20 - 29 mmol/L Final    Calcium 12/23/2024 9.7  8.7 - 10.3 mg/dL Final    Total Protein 12/23/2024 7.0  6.0 - 8.5 g/dL Final    Albumin 12/23/2024 4.6  3.9 - 4.9 g/dL Final    Globulin 12/23/2024 2.4  1.5 - 4.5 g/dL Final    Total Bilirubin 12/23/2024 0.5  0.0 - 1.2 mg/dL Final    Alkaline Phosphatase 12/23/2024 134 (H)  44 - 121 IU/L Final    AST (SGOT) 12/23/2024 29  0 - 40 IU/L Final    ALT (SGPT) 12/23/2024 27  0 - 32 IU/L Final    Hemoglobin A1C 12/23/2024 5.9 (H)  4.8 - 5.6 % Final    Comment:          Prediabetes: 5.7 - 6.4           Diabetes: >6.4           Glycemic control for adults with diabetes: <7.0      Total Cholesterol 12/23/2024 129  100 - 199 mg/dL Final    Triglycerides 12/23/2024 104  0 - 149 mg/dL Final    HDL Cholesterol 12/23/2024 60  >39 mg/dL Final    VLDL Cholesterol Compa 12/23/2024 19  5 - 40 mg/dL Final    LDL Chol Calc (NIH) 12/23/2024 50  0 - 99 mg/dL Final    LDL/HDL RATIO 12/23/2024 0.8  0.0 - 3.2 ratio Final    Comment:                                     LDL/HDL Ratio                                              Men  Women                                1/2 Avg.Risk  1.0    1.5                                    Avg.Risk  3.6    3.2                                 2X Avg.Risk  6.2    5.0                                 3X Avg.Risk  8.0    6.1      TSH 12/23/2024 2.570   0.450 - 4.500 uIU/mL Final    Free T4 12/23/2024 1.13  0.82 - 1.77 ng/dL Final    T3, Total 12/23/2024 143  71 - 180 ng/dL Final         Review of Systems   Constitutional: Negative.    HENT: Negative.     Respiratory: Negative.     Cardiovascular: Negative.    Gastrointestinal:  Positive for diarrhea.   Genitourinary: Negative.    Musculoskeletal: Negative.    Skin: Negative.    Neurological: Negative.    Psychiatric/Behavioral: Negative.         Objective   Physical Exam  Constitutional:       Appearance: Normal appearance.   HENT:      Head: Normocephalic.   Cardiovascular:      Rate and Rhythm: Normal rate and regular rhythm.      Pulses: Normal pulses.      Heart sounds: Normal heart sounds.   Pulmonary:      Effort: Pulmonary effort is normal.      Breath sounds: Normal breath sounds.   Abdominal:      General: Bowel sounds are normal.   Musculoskeletal:         General: Normal range of motion.   Skin:     General: Skin is warm.   Neurological:      General: No focal deficit present.      Mental Status: She is alert and oriented to person, place, and time.   Psychiatric:         Mood and Affect: Mood normal.         Behavior: Behavior normal.         Procedures    Assessment & Plan   Diagnoses and all orders for this visit:    1. Hypothyroidism, unspecified type (Primary)  -     TSH+Free T4  -     T3    2. Hyperlipidemia, unspecified hyperlipidemia type  -     Lipid Panel With LDL / HDL Ratio    3. B12 deficiency  -     Vitamin B12    4. Elevated hemoglobin A1c  -     Comprehensive Metabolic Panel  -     Hemoglobin A1c    5. Preventative health care  -     CBC & Differential    6. Former smoker    7. Obstructive sleep apnea    8. Class 1 obesity due to excess calories with serious comorbidity and body mass index (BMI) of 30.0 to 30.9 in adult    9. Encounter for screening mammogram for malignant neoplasm of breast  -     Mammo Screening Digital Tomosynthesis Bilateral With CAD; Future    10. Postmenopausal  -      DEXA Bone Density Axial; Future           Current Outpatient Medications:     tiZANidine (ZANAFLEX) 4 MG tablet, Take 1 tablet by mouth Every Night., Disp: , Rfl:     amLODIPine (NORVASC) 10 MG tablet, TAKE 1 TABLET BY MOUTH DAILY, Disp: 90 tablet, Rfl: 3    aspirin 81 MG EC tablet, Take 1 tablet by mouth Daily., Disp: 90 tablet, Rfl: 3    atorvastatin (LIPITOR) 80 MG tablet, TAKE 1 TABLET BY MOUTH DAILY, Disp: 90 tablet, Rfl: 0    celecoxib (CeleBREX) 100 MG capsule, TAKE ONE CAPSULE BY MOUTH TWICE DAILY AS NEEDED FOR moderate pain, Disp: 60 capsule, Rfl: 2    cetirizine (ZyrTEC Allergy) 10 MG tablet, Take 1 tablet by mouth Daily., Disp: 30 tablet, Rfl: 2    chlorthalidone (HYGROTON) 25 MG tablet, Take 1 tablet by mouth Daily., Disp: 90 tablet, Rfl: 3    diclofenac (VOLTAREN) 75 MG EC tablet, Take 1 tablet by mouth 2 (Two) Times a Day., Disp: , Rfl:     ezetimibe (ZETIA) 10 MG tablet, Take 1 tablet by mouth Daily., Disp: 90 tablet, Rfl: 1    hydrALAZINE (APRESOLINE) 50 MG tablet, TAKE 1 TABLET BY MOUTH TWICE DAILY, Disp: 180 tablet, Rfl: 0    HYDROcodone-acetaminophen (NORCO)  MG per tablet, Take 1 tablet by mouth Every 6 (Six) Hours., Disp: , Rfl: 0    levothyroxine (SYNTHROID, LEVOTHROID) 25 MCG tablet, TAKE 1 TABLET BY MOUTH EVERY MORNING, Disp: 90 tablet, Rfl: 1    nystatin (MYCOSTATIN) 100,000 unit/mL suspension, Swish and swallow 5 mL 4 (Four) Times a Day., Disp: 473 mL, Rfl: 2    pseudoephedrine (Sudafed) 30 MG tablet, Take 1 tablet by mouth Every 4 (Four) Hours As Needed for Congestion., Disp: 60 tablet, Rfl: 2           RBUIN Stoddard 6/23/2025 08:48 EDT  This note has been electronically signed

## 2025-06-23 NOTE — PATIENT INSTRUCTIONS
Fasting blood work  Mammogram and DEXA scan at priority  Keep appointment for sleep apnea  Report if nausea or diarrhea does not resolve  Please schedule eye exam  Follow-up 6 months

## 2025-06-24 LAB
ALBUMIN SERPL-MCNC: 4.5 G/DL (ref 3.9–4.9)
ALP SERPL-CCNC: 101 IU/L (ref 44–121)
ALT SERPL-CCNC: 18 IU/L (ref 0–32)
AST SERPL-CCNC: 21 IU/L (ref 0–40)
BASOPHILS # BLD AUTO: 0.1 X10E3/UL (ref 0–0.2)
BASOPHILS NFR BLD AUTO: 1 %
BILIRUB SERPL-MCNC: 0.3 MG/DL (ref 0–1.2)
BUN SERPL-MCNC: 24 MG/DL (ref 8–27)
BUN/CREAT SERPL: 28 (ref 12–28)
CALCIUM SERPL-MCNC: 9.6 MG/DL (ref 8.7–10.3)
CHLORIDE SERPL-SCNC: 96 MMOL/L (ref 96–106)
CHOLEST SERPL-MCNC: 138 MG/DL (ref 100–199)
CO2 SERPL-SCNC: 24 MMOL/L (ref 20–29)
CREAT SERPL-MCNC: 0.87 MG/DL (ref 0.57–1)
EGFRCR SERPLBLD CKD-EPI 2021: 75 ML/MIN/1.73
EOSINOPHIL # BLD AUTO: 0.6 X10E3/UL (ref 0–0.4)
EOSINOPHIL NFR BLD AUTO: 6 %
ERYTHROCYTE [DISTWIDTH] IN BLOOD BY AUTOMATED COUNT: 12.4 % (ref 11.7–15.4)
GLOBULIN SER CALC-MCNC: 2.4 G/DL (ref 1.5–4.5)
GLUCOSE SERPL-MCNC: 82 MG/DL (ref 70–99)
HBA1C MFR BLD: 5.5 % (ref 4.8–5.6)
HCT VFR BLD AUTO: 40.3 % (ref 34–46.6)
HDLC SERPL-MCNC: 74 MG/DL
HGB BLD-MCNC: 12.5 G/DL (ref 11.1–15.9)
IMM GRANULOCYTES # BLD AUTO: 0 X10E3/UL (ref 0–0.1)
IMM GRANULOCYTES NFR BLD AUTO: 0 %
LDLC SERPL CALC-MCNC: 50 MG/DL (ref 0–99)
LDLC/HDLC SERPL: 0.7 RATIO (ref 0–3.2)
LYMPHOCYTES # BLD AUTO: 3.4 X10E3/UL (ref 0.7–3.1)
LYMPHOCYTES NFR BLD AUTO: 34 %
MCH RBC QN AUTO: 29.8 PG (ref 26.6–33)
MCHC RBC AUTO-ENTMCNC: 31 G/DL (ref 31.5–35.7)
MCV RBC AUTO: 96 FL (ref 79–97)
MONOCYTES # BLD AUTO: 0.8 X10E3/UL (ref 0.1–0.9)
MONOCYTES NFR BLD AUTO: 8 %
NEUTROPHILS # BLD AUTO: 5.2 X10E3/UL (ref 1.4–7)
NEUTROPHILS NFR BLD AUTO: 51 %
PLATELET # BLD AUTO: 294 X10E3/UL (ref 150–450)
POTASSIUM SERPL-SCNC: 4.2 MMOL/L (ref 3.5–5.2)
PROT SERPL-MCNC: 6.9 G/DL (ref 6–8.5)
RBC # BLD AUTO: 4.2 X10E6/UL (ref 3.77–5.28)
SODIUM SERPL-SCNC: 136 MMOL/L (ref 134–144)
T3 SERPL-MCNC: 158 NG/DL (ref 71–180)
T4 FREE SERPL-MCNC: 1.28 NG/DL (ref 0.82–1.77)
TRIGL SERPL-MCNC: 68 MG/DL (ref 0–149)
TSH SERPL DL<=0.005 MIU/L-ACNC: 3.77 UIU/ML (ref 0.45–4.5)
VIT B12 SERPL-MCNC: >2000 PG/ML (ref 232–1245)
VLDLC SERPL CALC-MCNC: 14 MG/DL (ref 5–40)
WBC # BLD AUTO: 10.1 X10E3/UL (ref 3.4–10.8)

## 2025-07-07 RX ORDER — CELECOXIB 100 MG/1
CAPSULE ORAL
Qty: 60 CAPSULE | Refills: 2 | Status: SHIPPED | OUTPATIENT
Start: 2025-07-07